# Patient Record
Sex: MALE | Race: WHITE | NOT HISPANIC OR LATINO | Employment: OTHER | URBAN - METROPOLITAN AREA
[De-identification: names, ages, dates, MRNs, and addresses within clinical notes are randomized per-mention and may not be internally consistent; named-entity substitution may affect disease eponyms.]

---

## 2018-01-22 ENCOUNTER — TELEPHONE (OUTPATIENT)
Dept: INTERNAL MEDICINE | Facility: CLINIC | Age: 73
End: 2018-01-22

## 2018-01-22 NOTE — TELEPHONE ENCOUNTER
PATIENTS WIFE CALLED AND ASKED IF PATIENT CAN ESTABLISH CARE WITH DR. WYATT. PATIENT IS CURRENTLY IN NEED OF A REFERRAL TO CARDOIOLOGIST. THANK YOU.

## 2018-01-23 NOTE — TELEPHONE ENCOUNTER
Confirmed he is the  of Lula Ellis .  She was notified that he can establish with Dr Knott.      Anna please schedule his new patient appt

## 2018-03-22 ENCOUNTER — OFFICE VISIT (OUTPATIENT)
Dept: INTERNAL MEDICINE | Facility: CLINIC | Age: 73
End: 2018-03-22

## 2018-03-22 VITALS
RESPIRATION RATE: 20 BRPM | TEMPERATURE: 97.5 F | WEIGHT: 182.5 LBS | DIASTOLIC BLOOD PRESSURE: 80 MMHG | SYSTOLIC BLOOD PRESSURE: 150 MMHG | HEART RATE: 68 BPM | HEIGHT: 66 IN | BODY MASS INDEX: 29.33 KG/M2

## 2018-03-22 DIAGNOSIS — I10 ESSENTIAL HYPERTENSION: Primary | ICD-10-CM

## 2018-03-22 DIAGNOSIS — G25.81 RESTLESS LEG SYNDROME: ICD-10-CM

## 2018-03-22 DIAGNOSIS — H93.8X3 PRESSURE SENSATION IN BOTH EARS: ICD-10-CM

## 2018-03-22 DIAGNOSIS — Z23 NEED FOR STREPTOCOCCUS PNEUMONIAE VACCINATION: ICD-10-CM

## 2018-03-22 PROCEDURE — G0009 ADMIN PNEUMOCOCCAL VACCINE: HCPCS | Performed by: INTERNAL MEDICINE

## 2018-03-22 PROCEDURE — 90670 PCV13 VACCINE IM: CPT | Performed by: INTERNAL MEDICINE

## 2018-03-22 PROCEDURE — 99203 OFFICE O/P NEW LOW 30 MIN: CPT | Performed by: INTERNAL MEDICINE

## 2018-03-22 RX ORDER — AMOXICILLIN AND CLAVULANATE POTASSIUM 875; 125 MG/1; MG/1
TABLET, FILM COATED ORAL
COMMUNITY
Start: 2018-01-15 | End: 2018-03-22

## 2018-03-22 RX ORDER — HYDROCHLOROTHIAZIDE 25 MG/1
1 TABLET ORAL DAILY
COMMUNITY
Start: 2018-02-20 | End: 2018-06-29

## 2018-03-22 RX ORDER — GABAPENTIN 300 MG/1
1 CAPSULE ORAL DAILY
COMMUNITY
Start: 2018-03-14

## 2018-03-22 RX ORDER — ATORVASTATIN CALCIUM 20 MG/1
1 TABLET, FILM COATED ORAL DAILY
COMMUNITY
Start: 2018-03-07 | End: 2018-06-29

## 2018-03-22 NOTE — PROGRESS NOTES
John Ellis is a 72 y.o. male.     Chief Complaint   Patient presents with   • Hypertension     Establish Care non fasting        History obtained from the patient.    The patient is establishing care.   records received and reviewed.      History of Present Illness       Cardiac Follow-up:    His Hypertension is stable.    Medications: Hydrochlorothiazide daily.    His Hyperlipidemia is stable.    LDL goal less than 130.  Last  on 9/26/17 ( records).    Medication: Atorvastatin.    The patient is adherent with his medications.  He denies medication side effects.      Interval events: The patient states his blood pressure at home used to run 130-140 / 75-80.  He was visiting his nephew, whose wife is a cardiologist, in January 2018.  The patient felt funny, so he had his blood pressure taken.  He states the top number was 180-190.  His nephew's wife called in a prescription for Hydrochlorothiazide for him.  Since then his blood pressure at home has been running 120-135 / 70-73  The patient saw Dr. Dunbar in  February 2018.  The patient states labs were done and his cholesterol was normal.  However, Dr. Dunbar started him on Atorvastatin.  He has not had a problem with the medication.  He has a follow up appointment with Dr. Dunbar on 4/20/18.  Per records, the patient had been on Zocor  20 years ago and it caused dizziness and abdominal pain, so he stopped it.      Symptoms: Denies chest pain, shortness of breath, orthopnea, PND, AQUINO, palpitations, syncope, lightheadedness, dizziness, and headache.   Associated symptoms: No significant weight change.  Denies fatigue, blurred or double vision, myalgias, arthralgias, polyuria, polydipsia, memory loss, and concentration problems.      Lifestyle: The patient follows a diverse and healthy diet.  Exercise: None, but the patient states he is very active.    Colon Polyps Follow-up: The patient is here for follow-up of Colon Polyps which have been  stable per his report.   Interval events: Last colonoscopy was done 9/14/04 (one rectal polyp) per  records.  The patient states he had another Colonoscopy in 2016, which showed a polyp.  Symptoms: Denies abdominal pain, nausea, vomiting, diarrhea, blood in stool, black stool, or changes in the stool.    Medication: None.    Ear Pressure Acute Visit:  The patient's major complaint today is bilateral ear pressure, intermittently for the past 3 years.    Interval events: The patient has been seeing Dr. Blank for several years.  He states he has had negative allergy testing and a normal CT of his sinuses.  He states he has always had a normal exam, including a scope of his throat.  However, he states at his last follow-up it was discovered that he had a right thyroid nodule.  He states ultrasound showed it to just be a calcium deposit.  The patient would like to see another ENT doctor for a second opinion.    Symptoms: He has some congestion and postnasal drainage, as well as a cough productive of clear sputum, intermittently.  Complains of intermittent sore throat, but no rhinorrhea, earache, headache, chest pain, shortness of breath, or wheezing.    Medication: None, but he has tried a little, Flonase, and possibly Astepro without relief.          No current outpatient prescriptions on file prior to visit.     No current facility-administered medications on file prior to visit.        Current outpatient and discharge medications have been reconciled for the patient.  Salome Knott MD        The following portions of the patient's history were reviewed and updated as appropriate: allergies, current medications, past family history, past medical history, past social history, past surgical history and problem list.    Review of Systems   Constitutional: Negative for chills, fatigue, fever and unexpected weight change.   HENT: Positive for congestion, ear pain (pressure), sore throat and tinnitus. Negative for ear  "discharge, postnasal drip, rhinorrhea, sinus pain, sinus pressure and voice change.    Eyes: Negative for pain, discharge, redness, itching and visual disturbance.   Respiratory: Positive for cough. Negative for shortness of breath and wheezing.         Denies hemoptysis.   Cardiovascular: Negative for chest pain, palpitations and leg swelling.        No AQUINO, orthopnea, or claudication.   Gastrointestinal: Negative for abdominal pain, blood in stool, constipation, diarrhea, nausea and vomiting.        Denies melena.   Endocrine: Negative for polydipsia and polyuria.   Musculoskeletal: Negative for arthralgias and myalgias.   Neurological: Negative for dizziness, syncope, light-headedness and headaches.        No memory issues.   Hematological: Negative for adenopathy.   Psychiatric/Behavioral: Negative for decreased concentration.         Objective       Blood pressure 150/80, pulse 68, temperature 97.5 °F (36.4 °C), temperature source Temporal Artery , resp. rate 20, height 167 cm (65.75\"), weight 82.8 kg (182 lb 8 oz).      Physical Exam   Constitutional:   Overweight.   HENT:   Head: Normocephalic and atraumatic.   Right Ear: Tympanic membrane, external ear and ear canal normal.   Left Ear: Tympanic membrane, external ear and ear canal normal.   Mouth/Throat: Oropharynx is clear and moist and mucous membranes are normal. No oral lesions.   Tonsils normal.  No sinus tenderness to palpation.   Eyes: Conjunctivae are normal.   Neck: Normal range of motion. Neck supple. Carotid bruit is not present. Thyroid mass (right nodule) present. No thyromegaly present.   Cardiovascular: Normal rate, regular rhythm, normal heart sounds and intact distal pulses.  Exam reveals no gallop and no friction rub.    No murmur heard.  No peripheral edema.   Pulmonary/Chest: Effort normal and breath sounds normal.   Abdominal: Soft. Bowel sounds are normal. He exhibits no distension, no abdominal bruit and no mass. There is no " hepatosplenomegaly. There is no tenderness.   Lymphadenopathy:     He has no cervical adenopathy.   Skin: No rash noted.   Psychiatric: He has a normal mood and affect.   Nursing note and vitals reviewed.      Assessment / Plan:    Jasiel was seen today for hypertension.    Diagnoses and all orders for this visit:    Essential hypertension    Pressure sensation in both ears    Restless leg syndrome    Need for Streptococcus pneumoniae vaccination  -     Pneumococcal Conjugate Vaccine 13-Valent All      The patient declined scheduling a CT lung cancer screening today, despite my strong recommendation to have the test.    The patient declined scheduling a Medicare Wellness Exam today.    Recommended the patient have a Zostavax done at the pharmacy.  He declined.  He declined an Influenza vaccine today.    Return in about 3 months (around 6/22/2018) for Recheck-HTN fadting.

## 2018-03-25 PROBLEM — E04.1 SOLITARY NODULE OF RIGHT LOBE OF THYROID: Status: ACTIVE | Noted: 2018-03-25

## 2018-03-25 PROBLEM — G25.81 RESTLESS LEGS SYNDROME (RLS): Status: ACTIVE | Noted: 2018-03-25

## 2018-03-25 PROBLEM — M25.562 CHRONIC PAIN OF BOTH KNEES: Status: ACTIVE | Noted: 2018-03-25

## 2018-03-25 PROBLEM — G89.29 CHRONIC PAIN OF BOTH KNEES: Status: ACTIVE | Noted: 2018-03-25

## 2018-03-25 PROBLEM — M25.561 CHRONIC PAIN OF BOTH KNEES: Status: ACTIVE | Noted: 2018-03-25

## 2018-03-25 PROBLEM — E78.5 HYPERLIPIDEMIA: Status: ACTIVE | Noted: 2018-03-25

## 2018-03-25 PROBLEM — H91.93 BILATERAL HEARING LOSS: Status: ACTIVE | Noted: 2018-03-25

## 2018-03-25 PROBLEM — K63.5 BENIGN COLON POLYP: Status: ACTIVE | Noted: 2018-03-25

## 2018-04-13 ENCOUNTER — LAB (OUTPATIENT)
Dept: LAB | Facility: HOSPITAL | Age: 73
End: 2018-04-13

## 2018-04-13 DIAGNOSIS — I10 HYPERTENSION, UNSPECIFIED TYPE: Primary | ICD-10-CM

## 2018-04-13 LAB
ALBUMIN SERPL-MCNC: 4.4 G/DL (ref 3.2–4.8)
ALP SERPL-CCNC: 62 U/L (ref 25–100)
ALT SERPL W P-5'-P-CCNC: 33 U/L (ref 7–40)
ANION GAP SERPL CALCULATED.3IONS-SCNC: 11 MMOL/L (ref 3–11)
ARTICHOKE IGE QN: 152 MG/DL (ref 0–130)
AST SERPL-CCNC: 34 U/L (ref 0–33)
BILIRUB CONJ SERPL-MCNC: 0.1 MG/DL (ref 0–0.2)
BILIRUB INDIRECT SERPL-MCNC: 0.4 MG/DL (ref 0.1–1.1)
BILIRUB SERPL-MCNC: 0.5 MG/DL (ref 0.3–1.2)
BUN BLD-MCNC: 18 MG/DL (ref 9–23)
BUN/CREAT SERPL: 20 (ref 7–25)
CALCIUM SPEC-SCNC: 9.3 MG/DL (ref 8.7–10.4)
CHLORIDE SERPL-SCNC: 100 MMOL/L (ref 99–109)
CHOLEST SERPL-MCNC: 212 MG/DL (ref 0–200)
CO2 SERPL-SCNC: 26 MMOL/L (ref 20–31)
CREAT BLD-MCNC: 0.9 MG/DL (ref 0.6–1.3)
GFR SERPL CREATININE-BSD FRML MDRD: 83 ML/MIN/1.73
GLUCOSE BLD-MCNC: 98 MG/DL (ref 70–100)
HDLC SERPL-MCNC: 52 MG/DL (ref 40–60)
POTASSIUM BLD-SCNC: 4.2 MMOL/L (ref 3.5–5.5)
PROT SERPL-MCNC: 7.3 G/DL (ref 5.7–8.2)
SODIUM BLD-SCNC: 137 MMOL/L (ref 132–146)
TRIGL SERPL-MCNC: 175 MG/DL (ref 0–150)

## 2018-04-13 PROCEDURE — 80076 HEPATIC FUNCTION PANEL: CPT

## 2018-04-13 PROCEDURE — 80061 LIPID PANEL: CPT

## 2018-04-13 PROCEDURE — 36415 COLL VENOUS BLD VENIPUNCTURE: CPT

## 2018-04-13 PROCEDURE — 80048 BASIC METABOLIC PNL TOTAL CA: CPT

## 2018-06-29 ENCOUNTER — OFFICE VISIT (OUTPATIENT)
Dept: INTERNAL MEDICINE | Facility: CLINIC | Age: 73
End: 2018-06-29

## 2018-06-29 ENCOUNTER — TELEPHONE (OUTPATIENT)
Dept: INTERNAL MEDICINE | Facility: CLINIC | Age: 73
End: 2018-06-29

## 2018-06-29 VITALS
WEIGHT: 184.13 LBS | DIASTOLIC BLOOD PRESSURE: 80 MMHG | RESPIRATION RATE: 20 BRPM | BODY MASS INDEX: 29.94 KG/M2 | HEART RATE: 72 BPM | TEMPERATURE: 97 F | SYSTOLIC BLOOD PRESSURE: 140 MMHG

## 2018-06-29 DIAGNOSIS — J31.0 NON-ALLERGIC RHINITIS: ICD-10-CM

## 2018-06-29 DIAGNOSIS — K63.5 BENIGN COLON POLYP: ICD-10-CM

## 2018-06-29 DIAGNOSIS — Z11.59 NEED FOR HEPATITIS C SCREENING TEST: ICD-10-CM

## 2018-06-29 DIAGNOSIS — I10 ESSENTIAL HYPERTENSION: Primary | ICD-10-CM

## 2018-06-29 DIAGNOSIS — E78.49 OTHER HYPERLIPIDEMIA: ICD-10-CM

## 2018-06-29 LAB
ALBUMIN SERPL-MCNC: 4.42 G/DL (ref 3.2–4.8)
ALBUMIN/GLOB SERPL: 1.5 G/DL (ref 1.5–2.5)
ALP SERPL-CCNC: 61 U/L (ref 25–100)
ALT SERPL W P-5'-P-CCNC: 25 U/L (ref 7–40)
ANION GAP SERPL CALCULATED.3IONS-SCNC: 6 MMOL/L (ref 3–11)
ARTICHOKE IGE QN: 79 MG/DL (ref 0–130)
AST SERPL-CCNC: 26 U/L (ref 0–33)
BASOPHILS # BLD AUTO: 0.04 10*3/MM3 (ref 0–0.2)
BASOPHILS NFR BLD AUTO: 0.6 % (ref 0–1)
BILIRUB SERPL-MCNC: 0.7 MG/DL (ref 0.3–1.2)
BUN BLD-MCNC: 22 MG/DL (ref 9–23)
BUN/CREAT SERPL: 24.7 (ref 7–25)
CALCIUM SPEC-SCNC: 9.2 MG/DL (ref 8.7–10.4)
CHLORIDE SERPL-SCNC: 105 MMOL/L (ref 99–109)
CHOLEST SERPL-MCNC: 134 MG/DL (ref 0–200)
CLARITY, POC: CLEAR
CO2 SERPL-SCNC: 28 MMOL/L (ref 20–31)
COLOR UR: NORMAL
CREAT BLD-MCNC: 0.89 MG/DL (ref 0.6–1.3)
DEPRECATED RDW RBC AUTO: 40.9 FL (ref 37–54)
EOSINOPHIL # BLD AUTO: 0.2 10*3/MM3 (ref 0–0.3)
EOSINOPHIL NFR BLD AUTO: 2.8 % (ref 0–3)
ERYTHROCYTE [DISTWIDTH] IN BLOOD BY AUTOMATED COUNT: 13 % (ref 11.3–14.5)
EXPIRATION DATE: NORMAL
GFR SERPL CREATININE-BSD FRML MDRD: 85 ML/MIN/1.73
GLOBULIN UR ELPH-MCNC: 3 GM/DL
GLUCOSE BLD-MCNC: 108 MG/DL (ref 70–100)
GLUCOSE UR STRIP-MCNC: NEGATIVE MG/DL
HCT VFR BLD AUTO: 43.9 % (ref 38.9–50.9)
HCV AB SER DONR QL: NORMAL
HDLC SERPL-MCNC: 49 MG/DL (ref 40–60)
HGB BLD-MCNC: 14.4 G/DL (ref 13.1–17.5)
IMM GRANULOCYTES # BLD: 0.01 10*3/MM3 (ref 0–0.03)
IMM GRANULOCYTES NFR BLD: 0.1 % (ref 0–0.6)
KETONES UR QL: NEGATIVE
LEUKOCYTE EST, POC: NEGATIVE
LYMPHOCYTES # BLD AUTO: 2.33 10*3/MM3 (ref 0.6–4.8)
LYMPHOCYTES NFR BLD AUTO: 32.5 % (ref 24–44)
Lab: NORMAL
MCH RBC QN AUTO: 28.2 PG (ref 27–31)
MCHC RBC AUTO-ENTMCNC: 32.8 G/DL (ref 32–36)
MCV RBC AUTO: 85.9 FL (ref 80–99)
MONOCYTES # BLD AUTO: 0.56 10*3/MM3 (ref 0–1)
MONOCYTES NFR BLD AUTO: 7.8 % (ref 0–12)
NEUTROPHILS # BLD AUTO: 4.03 10*3/MM3 (ref 1.5–8.3)
NEUTROPHILS NFR BLD AUTO: 56.2 % (ref 41–71)
NITRITE UR-MCNC: NEGATIVE MG/ML
PH UR: 6 [PH] (ref 5–8)
PLATELET # BLD AUTO: 200 10*3/MM3 (ref 150–450)
PMV BLD AUTO: 10.7 FL (ref 6–12)
POTASSIUM BLD-SCNC: 4.4 MMOL/L (ref 3.5–5.5)
PROT SERPL-MCNC: 7.4 G/DL (ref 5.7–8.2)
PROT UR STRIP-MCNC: NEGATIVE MG/DL
PROT/CREAT UR: 300 MG/G CREA
RBC # BLD AUTO: 5.11 10*6/MM3 (ref 4.2–5.76)
RBC # UR STRIP: NEGATIVE /UL
SODIUM BLD-SCNC: 139 MMOL/L (ref 132–146)
SP GR UR: 1.02 (ref 1–1.03)
TRIGL SERPL-MCNC: 100 MG/DL (ref 0–150)
WBC NRBC COR # BLD: 7.17 10*3/MM3 (ref 3.5–10.8)

## 2018-06-29 PROCEDURE — 86803 HEPATITIS C AB TEST: CPT | Performed by: INTERNAL MEDICINE

## 2018-06-29 PROCEDURE — 80061 LIPID PANEL: CPT | Performed by: INTERNAL MEDICINE

## 2018-06-29 PROCEDURE — 99214 OFFICE O/P EST MOD 30 MIN: CPT | Performed by: INTERNAL MEDICINE

## 2018-06-29 PROCEDURE — 36415 COLL VENOUS BLD VENIPUNCTURE: CPT | Performed by: INTERNAL MEDICINE

## 2018-06-29 PROCEDURE — 80053 COMPREHEN METABOLIC PANEL: CPT | Performed by: INTERNAL MEDICINE

## 2018-06-29 PROCEDURE — 81002 URINALYSIS NONAUTO W/O SCOPE: CPT | Performed by: INTERNAL MEDICINE

## 2018-06-29 PROCEDURE — 85025 COMPLETE CBC W/AUTO DIFF WBC: CPT | Performed by: INTERNAL MEDICINE

## 2018-06-29 RX ORDER — AZELASTINE 1 MG/ML
2 SPRAY, METERED NASAL DAILY
Refills: 12
Start: 2018-06-29 | End: 2019-01-07

## 2018-06-29 RX ORDER — AZELASTINE HCL 205.5 UG/1
2 SPRAY NASAL DAILY
Qty: 1 EACH | Refills: 5 | Status: SHIPPED | OUTPATIENT
Start: 2018-06-29 | End: 2018-06-29 | Stop reason: CLARIF

## 2018-06-29 RX ORDER — LOSARTAN POTASSIUM AND HYDROCHLOROTHIAZIDE 12.5; 1 MG/1; MG/1
1 TABLET ORAL DAILY
COMMUNITY
Start: 2018-06-14 | End: 2019-07-08 | Stop reason: DRUGHIGH

## 2018-06-29 RX ORDER — ROSUVASTATIN CALCIUM 40 MG/1
1 TABLET, COATED ORAL DAILY
COMMUNITY
Start: 2018-06-18 | End: 2019-07-08 | Stop reason: SDUPTHER

## 2018-06-29 NOTE — TELEPHONE ENCOUNTER
Spoke with Nicole [facility] and notified to change Rx .  Med list updated.

## 2018-06-29 NOTE — TELEPHONE ENCOUNTER
----- Message from Louisa Handley sent at 6/29/2018 10:06 AM EDT -----  PHARMACY IS WANTING TO CHANGE azelastine (ASTEPRO) 0.15 % solution nasal spray TO .1%. INSURANCE WILL NOT COVER THE 0.15%.     [facility]     PLEASE ADVISE

## 2018-06-29 NOTE — PROGRESS NOTES
John Ellis is a 72 y.o. male.     Chief Complaint   Patient presents with   • Hypertension     3 month follow up  fasting        History obtained from the patient.      History of Present Illness     The patient has seen Dr. Blank in the past for a right-sided Thyroid Nodule.  Ultrasound showed the nodule to be calcified, so biopsy was not recommended.  On 5/7/18, the patient had a normal TSH, T4, T3, and CMP.    The patient sees Dr. Bernstein for Restless Leg Syndrome, stable on Gabapentin and Sinemet.    The patient states he sees Dr. Francisco, Urology, once per year.  He states his last PSA test was normal in September or October 2017.    Cardiac Follow-up:     His Hypertension is stable.    Medications: Hydrochlorothiazide and Losartan / HCTZ.  His Hyperlipidemia is unstable.    LDL goal less than 130.  Last  on 4/13/18.  Triglycerides were 175.     Medication: Crestor.    The patient is adherent with his medications.  He denies medication side effects.       Interval events: The patient states his blood pressure at home has been 105-115/ 70-80.  His blood pressure when at his Cardiologist's office in April was 138 / 81.  The patient saw Dr. Dunbar's PA, Rico Wick, on 4/13/18.  Fasting labs were done.  He was started on Losartan/HCTZ and Crestor.    His Aorvastatin had caused myalgias.     Symptoms: Denies chest pain, shortness of breath, orthopnea, PND, AQUINO, palpitations, syncope, lightheadedness, dizziness, and headache.   Associated symptoms:  Reports fatigue. Reports mild memory loss and concentration problems.  No significant weight change.  Denies  blurred or double vision, myalgias, arthralgias, polyuria, andpolydipsia.     Lifestyle: The patient follows a diverse and healthy diet.  Exercise: None, but the patient states he is very active.  Tobacco Use:  Former Smoker.     Colon Polyps Follow-up: The patient is here for follow-up of Colon Polyps which have been stable per  his report.   Interval events: Last colonoscopy was done 9/14/04 (one rectal polyp) per  records.  The patient states he had another Colonoscopy in 2016, which showed a polyp.  Symptoms: Denies abdominal pain, nausea, vomiting, diarrhea, blood in stool, black stool, or changes in the stool.    Medication: None.    Current Outpatient Prescriptions on File Prior to Visit   Medication Sig Dispense Refill   • carbidopa-levodopa (SINEMET)  MG per tablet 1 tablet Daily.     • gabapentin (NEURONTIN) 300 MG capsule 1 capsule 3 (Three) Times a Day.     • [DISCONTINUED] atorvastatin (LIPITOR) 20 MG tablet 1 tablet Daily.     • [DISCONTINUED] Glucosamine-Chondroitin (GLUCOSAMINE CHONDR COMPLEX PO) Take  by mouth.     • [DISCONTINUED] hydrochlorothiazide (HYDRODIURIL) 25 MG tablet 1 tablet Daily.       No current facility-administered medications on file prior to visit.        Current outpatient and discharge medications have been reconciled for the patient.  Reviewed by: Salome Knott MD        The following portions of the patient's history were reviewed and updated as appropriate: allergies, current medications, past family history, past medical history, past social history, past surgical history and problem list.    Review of Systems   Constitutional: Positive for fatigue. Negative for unexpected weight change.   HENT: Positive for congestion, hearing loss, postnasal drip, rhinorrhea, sore throat and tinnitus. Negative for ear discharge and ear pain (has ear pressure bilaterally).         He states he saw Dr. Blank for the allergy symptoms and tinnitus.  Allergy testing was negative per his report.  Hearing aids were recommended.  He was not started on any medication.   Eyes: Negative for visual disturbance.   Respiratory: Negative for cough, shortness of breath and wheezing.    Cardiovascular: Negative for chest pain, palpitations and leg swelling.        No AQUINO, orthopnea, or claudication.   Gastrointestinal:  Negative for abdominal pain, blood in stool, constipation, diarrhea, nausea and vomiting.        Denies melena.   Endocrine: Negative for polydipsia and polyuria.   Musculoskeletal: Positive for myalgias. Negative for arthralgias.   Neurological: Negative for dizziness, syncope, light-headedness and headaches.        Mild memory issues.   Psychiatric/Behavioral: Positive for decreased concentration (mild).         Objective       Blood pressure 140/80, pulse 72, temperature 97 °F (36.1 °C), temperature source Temporal Artery , resp. rate 20, weight 83.5 kg (184 lb 2 oz).      Physical Exam   Constitutional:   Overweight.   Neck: Normal range of motion. Neck supple. Carotid bruit is not present. No thyromegaly present.   Cardiovascular: Normal rate, regular rhythm, normal heart sounds and intact distal pulses.  Exam reveals no gallop and no friction rub.    No murmur heard.  No peripheral edema.   Pulmonary/Chest: Effort normal and breath sounds normal.   Abdominal: Soft. Bowel sounds are normal. He exhibits no distension, no abdominal bruit and no mass. There is no hepatosplenomegaly. There is no tenderness.   Psychiatric: He has a normal mood and affect.   Nursing note and vitals reviewed.      Assessment / Plan:  Jasiel was seen today for hypertension.    Diagnoses and all orders for this visit:    Essential hypertension  -     CBC & Differential  -     POC Urinalysis Dipstick, Multipro  -     CBC Auto Differential    Other hyperlipidemia  -     Lipid Panel  -     Comprehensive Metabolic Panel    Benign colon polyp    Non-allergic rhinitis  -     Azelastine (ASTEPRO) 0.15 % solution nasal spray; 2 sprays into each nostril Daily.    Need for hepatitis C screening test  -     Hepatitis C Antibody           The patient declined scheduling a CT lung cancer screening today, despite my strong recommendation to have the test.    The patient declined scheduling a abdominal aortic ultrasound today,despite my strong  recommendation to have the test.     The patient declined scheduling a Medicare Wellness Exam today.     Recommended the patient have a Shingrix done at the pharmacy.  He declined.    KEVIN signed to obtain last Colonoscopy report.      Return in about 6 months (around 12/29/2018) for Recheck, HTN,  fasting.

## 2019-01-07 ENCOUNTER — OFFICE VISIT (OUTPATIENT)
Dept: INTERNAL MEDICINE | Facility: CLINIC | Age: 74
End: 2019-01-07

## 2019-01-07 VITALS
DIASTOLIC BLOOD PRESSURE: 80 MMHG | RESPIRATION RATE: 20 BRPM | TEMPERATURE: 97.1 F | WEIGHT: 182.25 LBS | BODY MASS INDEX: 29.64 KG/M2 | SYSTOLIC BLOOD PRESSURE: 120 MMHG | HEART RATE: 72 BPM

## 2019-01-07 DIAGNOSIS — M25.562 CHRONIC PAIN OF BOTH KNEES: ICD-10-CM

## 2019-01-07 DIAGNOSIS — M25.561 CHRONIC PAIN OF BOTH KNEES: ICD-10-CM

## 2019-01-07 DIAGNOSIS — G89.29 CHRONIC PAIN OF BOTH KNEES: ICD-10-CM

## 2019-01-07 DIAGNOSIS — I10 ESSENTIAL HYPERTENSION: Primary | ICD-10-CM

## 2019-01-07 DIAGNOSIS — K63.5 BENIGN COLON POLYP: ICD-10-CM

## 2019-01-07 DIAGNOSIS — E78.49 OTHER HYPERLIPIDEMIA: ICD-10-CM

## 2019-01-07 LAB
ALBUMIN SERPL-MCNC: 4.58 G/DL (ref 3.2–4.8)
ALBUMIN/GLOB SERPL: 1.9 G/DL (ref 1.5–2.5)
ALP SERPL-CCNC: 59 U/L (ref 25–100)
ALT SERPL W P-5'-P-CCNC: 26 U/L (ref 7–40)
ANION GAP SERPL CALCULATED.3IONS-SCNC: 6 MMOL/L (ref 3–11)
ARTICHOKE IGE QN: 70 MG/DL (ref 0–130)
AST SERPL-CCNC: 31 U/L (ref 0–33)
BASOPHILS # BLD AUTO: 0.03 10*3/MM3 (ref 0–0.2)
BASOPHILS NFR BLD AUTO: 0.3 % (ref 0–1)
BILIRUB SERPL-MCNC: 0.6 MG/DL (ref 0.3–1.2)
BUN BLD-MCNC: 21 MG/DL (ref 9–23)
BUN/CREAT SERPL: 21.4 (ref 7–25)
CALCIUM SPEC-SCNC: 9.6 MG/DL (ref 8.7–10.4)
CHLORIDE SERPL-SCNC: 100 MMOL/L (ref 99–109)
CHOLEST SERPL-MCNC: 135 MG/DL (ref 0–200)
CO2 SERPL-SCNC: 30 MMOL/L (ref 20–31)
CREAT BLD-MCNC: 0.98 MG/DL (ref 0.6–1.3)
DEPRECATED RDW RBC AUTO: 39.2 FL (ref 37–54)
EOSINOPHIL # BLD AUTO: 0.29 10*3/MM3 (ref 0–0.3)
EOSINOPHIL NFR BLD AUTO: 3.1 % (ref 0–3)
ERYTHROCYTE [DISTWIDTH] IN BLOOD BY AUTOMATED COUNT: 12.7 % (ref 11.3–14.5)
GFR SERPL CREATININE-BSD FRML MDRD: 75 ML/MIN/1.73
GLOBULIN UR ELPH-MCNC: 2.4 GM/DL
GLUCOSE BLD-MCNC: 111 MG/DL (ref 70–100)
HCT VFR BLD AUTO: 45 % (ref 38.9–50.9)
HDLC SERPL-MCNC: 50 MG/DL (ref 40–60)
HGB BLD-MCNC: 14.9 G/DL (ref 13.1–17.5)
IMM GRANULOCYTES # BLD AUTO: 0.03 10*3/MM3 (ref 0–0.03)
IMM GRANULOCYTES NFR BLD AUTO: 0.3 % (ref 0–0.6)
LYMPHOCYTES # BLD AUTO: 2.91 10*3/MM3 (ref 0.6–4.8)
LYMPHOCYTES NFR BLD AUTO: 31.1 % (ref 24–44)
MCH RBC QN AUTO: 28.1 PG (ref 27–31)
MCHC RBC AUTO-ENTMCNC: 33.1 G/DL (ref 32–36)
MCV RBC AUTO: 84.9 FL (ref 80–99)
MONOCYTES # BLD AUTO: 0.78 10*3/MM3 (ref 0–1)
MONOCYTES NFR BLD AUTO: 8.3 % (ref 0–12)
NEUTROPHILS # BLD AUTO: 5.32 10*3/MM3 (ref 1.5–8.3)
NEUTROPHILS NFR BLD AUTO: 56.9 % (ref 41–71)
PLATELET # BLD AUTO: 212 10*3/MM3 (ref 150–450)
PMV BLD AUTO: 10.6 FL (ref 6–12)
POTASSIUM BLD-SCNC: 4.1 MMOL/L (ref 3.5–5.5)
PROT SERPL-MCNC: 7 G/DL (ref 5.7–8.2)
RBC # BLD AUTO: 5.3 10*6/MM3 (ref 4.2–5.76)
SODIUM BLD-SCNC: 136 MMOL/L (ref 132–146)
TRIGL SERPL-MCNC: 134 MG/DL (ref 0–150)
TSH SERPL DL<=0.05 MIU/L-ACNC: 4.33 MIU/ML (ref 0.35–5.35)
WBC NRBC COR # BLD: 9.36 10*3/MM3 (ref 3.5–10.8)

## 2019-01-07 PROCEDURE — 80061 LIPID PANEL: CPT | Performed by: INTERNAL MEDICINE

## 2019-01-07 PROCEDURE — 84443 ASSAY THYROID STIM HORMONE: CPT | Performed by: INTERNAL MEDICINE

## 2019-01-07 PROCEDURE — 36415 COLL VENOUS BLD VENIPUNCTURE: CPT | Performed by: INTERNAL MEDICINE

## 2019-01-07 PROCEDURE — 85025 COMPLETE CBC W/AUTO DIFF WBC: CPT | Performed by: INTERNAL MEDICINE

## 2019-01-07 PROCEDURE — 80053 COMPREHEN METABOLIC PANEL: CPT | Performed by: INTERNAL MEDICINE

## 2019-01-07 PROCEDURE — 99214 OFFICE O/P EST MOD 30 MIN: CPT | Performed by: INTERNAL MEDICINE

## 2019-01-07 NOTE — PROGRESS NOTES
John Ellis is a 73 y.o. male.     Chief Complaint   Patient presents with   • Hypertension     6 month follow up fasting        History obtained from the patient.      History of Present Illness     The patient has seen Dr. Blank in the past for a right-sided Thyroid Nodule.  Ultrasound showed the nodule to be calcified, so biopsy was not recommended. He is due for follow-up, but is not sure he wants to continue to see Dr. Blank.     The patient sees Dr. Bernstein for Restless Leg Syndrome, stable on Gabapentin and Sinemet.     The patient states he sees Dr. Francisco, Urology, once per year.  He states his last PSA test was 1/4/19, results pending.     Cardiac Follow-up:     His Hypertension is stable.    Medications:  Losartan / HCTZ.  His Hyperlipidemia is stable.    LDL goal less than 130.  Last LDL 79 on 6/29/18.     Medication: Crestor.    The patient is adherent with his medications.  He denies medication side effects.       Interval events: The patient sees Dr. Dunbar.     Symptoms: Denies chest pain, shortness of breath, AQUINO, orthopnea, PND,  palpitations, syncope, lightheadedness, and dizziness.    Associated symptoms:  No significant weight change.  Denies fatigue, headache,  blurred or double vision, myalgias, arthralgias, polyuria, polydipsia, memory loss, concentration problems..     Lifestyle: The patient follows a diverse and healthy diet.  Exercise: None, but the patient states he is very active.  Tobacco Use:  Former Smoker.     Colon Polyps Follow-up: The patient is here for follow-up of Colon Polyps which have been stable/  Interval events: Last Colonoscopy was done 3/28/17, sigmoid polyp.  Symptoms: Denies abdominal pain, nausea, vomiting, diarrhea, blood in stool, black stool, or changes in the stool.    Medication: None.    Current Outpatient Medications on File Prior to Visit   Medication Sig Dispense Refill   • carbidopa-levodopa (SINEMET)  MG per tablet 1 tablet  Daily.     • gabapentin (NEURONTIN) 300 MG capsule 1 capsule 3 (Three) Times a Day.     • losartan-hydrochlorothiazide (HYZAAR) 100-12.5 MG per tablet 1 tablet Daily.     • rosuvastatin (CRESTOR) 40 MG tablet 1 tablet Daily.     • [DISCONTINUED] azelastine (ASTELIN) 0.1 % nasal spray 2 sprays into each nostril Daily. Use in each nostril as directed  12     No current facility-administered medications on file prior to visit.        Current outpatient and discharge medications have been reconciled for the patient.  Reviewed by: Salome Knott MD        The following portions of the patient's history were reviewed and updated as appropriate: allergies, current medications, past family history, past medical history, past social history, past surgical history and problem list.    Review of Systems   Constitutional: Negative for fatigue and unexpected weight change.   Eyes: Negative for visual disturbance.   Respiratory: Negative for cough, shortness of breath and wheezing.    Cardiovascular: Negative for chest pain, palpitations and leg swelling.        No AQUINO, orthopnea, or claudication.   Gastrointestinal: Negative for abdominal pain, blood in stool, constipation, diarrhea, nausea and vomiting.        Denies melena.   Endocrine: Negative for polydipsia and polyuria.   Musculoskeletal: Negative for arthralgias and myalgias.   Neurological: Negative for dizziness, syncope, light-headedness and headaches.        No memory issues.   Psychiatric/Behavioral: Negative for decreased concentration.         Objective       Blood pressure 120/80, pulse 72, temperature 97.1 °F (36.2 °C), temperature source Temporal, resp. rate 20, weight 82.7 kg (182 lb 4 oz).      Physical Exam   Constitutional:   Overweight.   Neck: Normal range of motion. Neck supple. Carotid bruit is not present. No thyromegaly present.   Cardiovascular: Normal rate, regular rhythm, normal heart sounds and intact distal pulses. Exam reveals no gallop and no  friction rub.   No murmur heard.  No peripheral edema.   Pulmonary/Chest: Effort normal and breath sounds normal.   Abdominal: Soft. Bowel sounds are normal. He exhibits no distension, no abdominal bruit and no mass. There is no hepatosplenomegaly. There is no tenderness.   Psychiatric: He has a normal mood and affect.   Nursing note and vitals reviewed.      Assessment / Plan:  Jasiel was seen today for hypertension.    Diagnoses and all orders for this visit:    Essential hypertension  -     CBC & Differential  -     CBC Auto Differential    Other hyperlipidemia  -     Lipid Panel  -     Comprehensive Metabolic Panel  -     TSH    Chronic pain of both knees    Benign colon polyp        Recommend Hepatitis A vaccine at the pharmacy.  The patient states he will think about getting this.  He declines getting Shingrix at the pharmacy.  He declined an Influenza vaccine today.  I strongly recommended these immunizations, and inform the patient he can die from influenza  infection.    The patient declined scheduling an abdominal aortic aneurysm screening ultrasound and a CT lung cancer screening.    The patient refused to have an abdominal aortic aneurysm screening ultrasound and a CT lung cancer screening..  The patient was informed that in my medical opinion, the tests are medically necessary, and failure to obtain the test may result in conditions that lead to death, inability to diagnose the condition, and progression of the condition.    The patient declined scheduling a Medicare Wellness Exam today.        Return in about 6 months (around 7/7/2019) for Recheck HTN, fasting.

## 2019-01-07 NOTE — PATIENT INSTRUCTIONS
Recommend Hepatitis A vaccine at the pharmacy.    Heart-Healthy Eating Plan  Many factors influence your heart health, including eating and exercise habits. Heart (coronary) risk increases with abnormal blood fat (lipid) levels. Heart-healthy meal planning includes limiting unhealthy fats, increasing healthy fats, and making other small dietary changes. This includes maintaining a healthy body weight to help keep lipid levels within a normal range.  What is my plan?  Your health care provider recommends that you:  · Get no more than _________% of the total calories in your daily diet from fat.  · Limit your intake of saturated fat to less than _________% of your total calories each day.  · Limit the amount of cholesterol in your diet to less than _________ mg per day.    What types of fat should I choose?  · Choose healthy fats more often. Choose monounsaturated and polyunsaturated fats, such as olive oil and canola oil, flaxseeds, walnuts, almonds, and seeds.  · Eat more omega-3 fats. Good choices include salmon, mackerel, sardines, tuna, flaxseed oil, and ground flaxseeds. Aim to eat fish at least two times each week.  · Limit saturated fats. Saturated fats are primarily found in animal products, such as meats, butter, and cream. Plant sources of saturated fats include palm oil, palm kernel oil, and coconut oil.  · Avoid foods with partially hydrogenated oils in them. These contain trans fats. Examples of foods that contain trans fats are stick margarine, some tub margarines, cookies, crackers, and other baked goods.  What general guidelines do I need to follow?  · Check food labels carefully to identify foods with trans fats or high amounts of saturated fat.  · Fill one half of your plate with vegetables and green salads. Eat 4-5 servings of vegetables per day. A serving of vegetables equals 1 cup of raw leafy vegetables, ½ cup of raw or cooked cut-up vegetables, or ½ cup of vegetable juice.  · Fill one fourth  "of your plate with whole grains. Look for the word \"whole\" as the first word in the ingredient list.  · Fill one fourth of your plate with lean protein foods.  · Eat 4-5 servings of fruit per day. A serving of fruit equals one medium whole fruit, ¼ cup of dried fruit, ½ cup of fresh, frozen, or canned fruit, or ½ cup of 100% fruit juice.  · Eat more foods that contain soluble fiber. Examples of foods that contain this type of fiber are apples, broccoli, carrots, beans, peas, and barley. Aim to get 20-30 g of fiber per day.  · Eat more home-cooked food and less restaurant, buffet, and fast food.  · Limit or avoid alcohol.  · Limit foods that are high in starch and sugar.  · Avoid fried foods.  · Cook foods by using methods other than frying. Baking, boiling, grilling, and broiling are all great options. Other fat-reducing suggestions include:  ? Removing the skin from poultry.  ? Removing all visible fats from meats.  ? Skimming the fat off of stews, soups, and gravies before serving them.  ? Steaming vegetables in water or broth.  · Lose weight if you are overweight. Losing just 5-10% of your initial body weight can help your overall health and prevent diseases such as diabetes and heart disease.  · Increase your consumption of nuts, legumes, and seeds to 4-5 servings per week. One serving of dried beans or legumes equals ½ cup after being cooked, one serving of nuts equals 1½ ounces, and one serving of seeds equals ½ ounce or 1 tablespoon.  · You may need to monitor your salt (sodium) intake, especially if you have high blood pressure. Talk with your health care provider or dietitian to get more information about reducing sodium.  What foods can I eat?  Grains    Breads, including Maldivian, white, xi, wheat, raisin, rye, oatmeal, and Italian. Tortillas that are neither fried nor made with lard or trans fat. Low-fat rolls, including hotdog and hamburger buns and English muffins. Biscuits. Muffins. Waffles. Pancakes. " Light popcorn. Whole-grain cereals. Flatbread. Laura toast. Pretzels. Breadsticks. Rusks. Low-fat snacks and crackers, including oyster, saltine, matzo, vibha, animal, and rye. Rice and pasta, including brown rice and those that are made with whole wheat.  Vegetables  All vegetables.  Fruits  All fruits, but limit coconut.  Meats and Other Protein Sources  Lean, well-trimmed beef, veal, pork, and lamb. Chicken and turkey without skin. All fish and shellfish. Wild duck, rabbit, pheasant, and venison. Egg whites or low-cholesterol egg substitutes. Dried beans, peas, lentils, and tofu. Seeds and most nuts.  Dairy  Low-fat or nonfat cheeses, including ricotta, string, and mozzarella. Skim or 1% milk that is liquid, powdered, or evaporated. Buttermilk that is made with low-fat milk. Nonfat or low-fat yogurt.  Beverages  Mineral water. Diet carbonated beverages.  Sweets and Desserts  Sherbets and fruit ices. Honey, jam, marmalade, jelly, and syrups. Meringues and gelatins. Pure sugar candy, such as hard candy, jelly beans, gumdrops, mints, marshmallows, and small amounts of dark chocolate. Delgado food cake.  Eat all sweets and desserts in moderation.  Fats and Oils  Nonhydrogenated (trans-free) margarines. Vegetable oils, including soybean, sesame, sunflower, olive, peanut, safflower, corn, canola, and cottonseed. Salad dressings or mayonnaise that are made with a vegetable oil. Limit added fats and oils that you use for cooking, baking, salads, and as spreads.  Other  Cocoa powder. Coffee and tea. All seasonings and condiments.  The items listed above may not be a complete list of recommended foods or beverages. Contact your dietitian for more options.  What foods are not recommended?  Grains  Breads that are made with saturated or trans fats, oils, or whole milk. Croissants. Butter rolls. Cheese breads. Sweet rolls. Donuts. Buttered popcorn. Chow mein noodles. High-fat crackers, such as cheese or butter crackers.  Meats  and Other Protein Sources  Fatty meats, such as hotdogs, short ribs, sausage, spareribs, jones, ribeye roast or steak, and mutton. High-fat deli meats, such as salami and bologna. Caviar. Domestic duck and goose. Organ meats, such as kidney, liver, sweetbreads, brains, gizzard, chitterlings, and heart.  Dairy  Cream, sour cream, cream cheese, and creamed cottage cheese. Whole milk cheeses, including blue (tati), Barron Lefty, Brie, Tam, American, Havarti, Swiss, cheddar, Camembert, and Vantage. Whole or 2% milk that is liquid, evaporated, or condensed. Whole buttermilk. Cream sauce or high-fat cheese sauce. Yogurt that is made from whole milk.  Beverages  Regular sodas and drinks with added sugar.  Sweets and Desserts  Frosting. Pudding. Cookies. Cakes other than merary food cake. Candy that has milk chocolate or white chocolate, hydrogenated fat, butter, coconut, or unknown ingredients. Buttered syrups. Full-fat ice cream or ice cream drinks.  Fats and Oils  Gravy that has suet, meat fat, or shortening. Cocoa butter, hydrogenated oils, palm oil, coconut oil, palm kernel oil. These can often be found in baked products, candy, fried foods, nondairy creamers, and whipped toppings. Solid fats and shortenings, including jones fat, salt pork, lard, and butter. Nondairy cream substitutes, such as coffee creamers and sour cream substitutes. Salad dressings that are made of unknown oils, cheese, or sour cream.  The items listed above may not be a complete list of foods and beverages to avoid. Contact your dietitian for more information.  This information is not intended to replace advice given to you by your health care provider. Make sure you discuss any questions you have with your health care provider.  Document Released: 09/26/2009 Document Revised: 07/07/2017 Document Reviewed: 06/11/2015  [employer] Interactive Patient Education © 2018 [employer]    Exercising to Lose Weight  Exercising can help you to lose  weight. In order to lose weight through exercise, you need to do vigorous-intensity exercise. You can tell that you are exercising with vigorous intensity if you are breathing very hard and fast and cannot hold a conversation while exercising.  Moderate-intensity exercise helps to maintain your current weight. You can tell that you are exercising at a moderate level if you have a higher heart rate and faster breathing, but you are still able to hold a conversation.  How often should I exercise?  Choose an activity that you enjoy and set realistic goals. Your health care provider can help you to make an activity plan that works for you. Exercise regularly as directed by your health care provider. This may include:  · Doing resistance training twice each week, such as:  ? Push-ups.  ? Sit-ups.  ? Lifting weights.  ? Using resistance bands.  · Doing a given intensity of exercise for a given amount of time. Choose from these options:  ? 150 minutes of moderate-intensity exercise every week.  ? 75 minutes of vigorous-intensity exercise every week.  ? A mix of moderate-intensity and vigorous-intensity exercise every week.    Children, pregnant women, people who are out of shape, people who are overweight, and older adults may need to consult a health care provider for individual recommendations. If you have any sort of medical condition, be sure to consult your health care provider before starting a new exercise program.  What are some activities that can help me to lose weight?  · Walking at a rate of at least 4.5 miles an hour.  · Jogging or running at a rate of 5 miles per hour.  · Biking at a rate of at least 10 miles per hour.  · Lap swimming.  · Roller-skating or in-line skating.  · Cross-country skiing.  · Vigorous competitive sports, such as football, basketball, and soccer.  · Jumping rope.  · Aerobic dancing.  How can I be more active in my day-to-day activities?  · Use the stairs instead of the  elevator.  · Take a walk during your lunch break.  · If you drive, park your car farther away from work or school.  · If you take public transportation, get off one stop early and walk the rest of the way.  · Make all of your phone calls while standing up and walking around.  · Get up, stretch, and walk around every 30 minutes throughout the day.  What guidelines should I follow while exercising?  · Do not exercise so much that you hurt yourself, feel dizzy, or get very short of breath.  · Consult your health care provider prior to starting a new exercise program.  · Wear comfortable clothes and shoes with good support.  · Drink plenty of water while you exercise to prevent dehydration or heat stroke. Body water is lost during exercise and must be replaced.  · Work out until you breathe faster and your heart beats faster.  This information is not intended to replace advice given to you by your health care provider. Make sure you discuss any questions you have with your health care provider.  Document Released: 01/20/2012 Document Revised: 05/25/2017 Document Reviewed: 05/21/2015  [employer] Interactive Patient Education © 2018 [employer]

## 2019-01-10 ENCOUNTER — TELEPHONE (OUTPATIENT)
Dept: INTERNAL MEDICINE | Facility: CLINIC | Age: 74
End: 2019-01-10

## 2019-03-05 ENCOUNTER — LAB (OUTPATIENT)
Dept: LAB | Facility: HOSPITAL | Age: 74
End: 2019-03-05

## 2019-03-05 ENCOUNTER — TRANSCRIBE ORDERS (OUTPATIENT)
Dept: LAB | Facility: HOSPITAL | Age: 74
End: 2019-03-05

## 2019-03-05 DIAGNOSIS — E78.00 PURE HYPERCHOLESTEROLEMIA: Primary | ICD-10-CM

## 2019-03-05 DIAGNOSIS — E78.00 PURE HYPERCHOLESTEROLEMIA: ICD-10-CM

## 2019-03-05 LAB
ALBUMIN SERPL-MCNC: 4.52 G/DL (ref 3.2–4.8)
ALP SERPL-CCNC: 58 U/L (ref 25–100)
ALT SERPL W P-5'-P-CCNC: 23 U/L (ref 7–40)
ARTICHOKE IGE QN: 64 MG/DL (ref 0–130)
AST SERPL-CCNC: 23 U/L (ref 0–33)
BILIRUB CONJ SERPL-MCNC: 0.1 MG/DL (ref 0–0.2)
BILIRUB INDIRECT SERPL-MCNC: 0.3 MG/DL (ref 0.1–1.1)
BILIRUB SERPL-MCNC: 0.4 MG/DL (ref 0.3–1.2)
CHOLEST SERPL-MCNC: 119 MG/DL (ref 0–200)
HDLC SERPL-MCNC: 43 MG/DL (ref 40–60)
PROT SERPL-MCNC: 6.8 G/DL (ref 5.7–8.2)
TRIGL SERPL-MCNC: 102 MG/DL (ref 0–150)

## 2019-03-05 PROCEDURE — 80061 LIPID PANEL: CPT | Performed by: INTERNAL MEDICINE

## 2019-03-05 PROCEDURE — 36415 COLL VENOUS BLD VENIPUNCTURE: CPT

## 2019-03-05 PROCEDURE — 80076 HEPATIC FUNCTION PANEL: CPT

## 2019-07-08 ENCOUNTER — OFFICE VISIT (OUTPATIENT)
Dept: INTERNAL MEDICINE | Facility: CLINIC | Age: 74
End: 2019-07-08

## 2019-07-08 VITALS
BODY MASS INDEX: 29.34 KG/M2 | TEMPERATURE: 97.3 F | HEART RATE: 72 BPM | SYSTOLIC BLOOD PRESSURE: 130 MMHG | DIASTOLIC BLOOD PRESSURE: 78 MMHG | WEIGHT: 180.38 LBS | RESPIRATION RATE: 20 BRPM

## 2019-07-08 DIAGNOSIS — E78.49 OTHER HYPERLIPIDEMIA: ICD-10-CM

## 2019-07-08 DIAGNOSIS — E04.1 SOLITARY NODULE OF RIGHT LOBE OF THYROID: ICD-10-CM

## 2019-07-08 DIAGNOSIS — K63.5 BENIGN COLON POLYP: ICD-10-CM

## 2019-07-08 DIAGNOSIS — Z23 NEED FOR PNEUMOCOCCAL VACCINATION: ICD-10-CM

## 2019-07-08 DIAGNOSIS — Z87.891 PERSONAL HISTORY OF TOBACCO USE, PRESENTING HAZARDS TO HEALTH: ICD-10-CM

## 2019-07-08 DIAGNOSIS — I10 ESSENTIAL HYPERTENSION: Primary | ICD-10-CM

## 2019-07-08 LAB
ALBUMIN SERPL-MCNC: 4.2 G/DL (ref 3.5–5.2)
ALBUMIN/GLOB SERPL: 1.3 G/DL
ALP SERPL-CCNC: 56 U/L (ref 39–117)
ALT SERPL W P-5'-P-CCNC: 16 U/L (ref 1–41)
ANION GAP SERPL CALCULATED.3IONS-SCNC: 13.4 MMOL/L (ref 5–15)
AST SERPL-CCNC: 19 U/L (ref 1–40)
BASOPHILS # BLD AUTO: 0.06 10*3/MM3 (ref 0–0.2)
BASOPHILS NFR BLD AUTO: 0.8 % (ref 0–1.5)
BILIRUB SERPL-MCNC: 0.4 MG/DL (ref 0.2–1.2)
BUN BLD-MCNC: 18 MG/DL (ref 8–23)
BUN/CREAT SERPL: 21.7 (ref 7–25)
CALCIUM SPEC-SCNC: 9.7 MG/DL (ref 8.6–10.5)
CHLORIDE SERPL-SCNC: 103 MMOL/L (ref 98–107)
CHOLEST SERPL-MCNC: 134 MG/DL (ref 0–200)
CLARITY, POC: CLEAR
CO2 SERPL-SCNC: 24.6 MMOL/L (ref 22–29)
COLOR UR: YELLOW
CREAT BLD-MCNC: 0.83 MG/DL (ref 0.76–1.27)
DEPRECATED RDW RBC AUTO: 41.5 FL (ref 37–54)
EOSINOPHIL # BLD AUTO: 0.33 10*3/MM3 (ref 0–0.4)
EOSINOPHIL NFR BLD AUTO: 4.4 % (ref 0.3–6.2)
ERYTHROCYTE [DISTWIDTH] IN BLOOD BY AUTOMATED COUNT: 13.2 % (ref 12.3–15.4)
EXPIRATION DATE: ABNORMAL
GFR SERPL CREATININE-BSD FRML MDRD: 91 ML/MIN/1.73
GLOBULIN UR ELPH-MCNC: 3.2 GM/DL
GLUCOSE BLD-MCNC: 99 MG/DL (ref 65–99)
GLUCOSE UR STRIP-MCNC: NEGATIVE MG/DL
HCT VFR BLD AUTO: 46 % (ref 37.5–51)
HDLC SERPL-MCNC: 49 MG/DL (ref 40–60)
HGB BLD-MCNC: 14.6 G/DL (ref 13–17.7)
IMM GRANULOCYTES # BLD AUTO: 0.03 10*3/MM3 (ref 0–0.05)
IMM GRANULOCYTES NFR BLD AUTO: 0.4 % (ref 0–0.5)
KETONES UR QL: ABNORMAL
LDLC SERPL CALC-MCNC: 58 MG/DL (ref 0–100)
LDLC/HDLC SERPL: 1.19 {RATIO}
LEUKOCYTE EST, POC: NEGATIVE
LYMPHOCYTES # BLD AUTO: 2.21 10*3/MM3 (ref 0.7–3.1)
LYMPHOCYTES NFR BLD AUTO: 29.5 % (ref 19.6–45.3)
Lab: ABNORMAL
MCH RBC QN AUTO: 27.4 PG (ref 26.6–33)
MCHC RBC AUTO-ENTMCNC: 31.7 G/DL (ref 31.5–35.7)
MCV RBC AUTO: 86.5 FL (ref 79–97)
MONOCYTES # BLD AUTO: 0.74 10*3/MM3 (ref 0.1–0.9)
MONOCYTES NFR BLD AUTO: 9.9 % (ref 5–12)
NEUTROPHILS # BLD AUTO: 4.11 10*3/MM3 (ref 1.7–7)
NEUTROPHILS NFR BLD AUTO: 55 % (ref 42.7–76)
NITRITE UR-MCNC: NEGATIVE MG/ML
NRBC BLD AUTO-RTO: 0 /100 WBC (ref 0–0.2)
PH UR: 5.5 [PH] (ref 5–8)
PLATELET # BLD AUTO: 189 10*3/MM3 (ref 140–450)
PMV BLD AUTO: 11.2 FL (ref 6–12)
POTASSIUM BLD-SCNC: 4.5 MMOL/L (ref 3.5–5.2)
PROT SERPL-MCNC: 7.4 G/DL (ref 6–8.5)
PROT UR STRIP-MCNC: NEGATIVE MG/DL
PROT/CREAT UR: 300 MG/G CREA
RBC # BLD AUTO: 5.32 10*6/MM3 (ref 4.14–5.8)
RBC # UR STRIP: ABNORMAL /UL
SODIUM BLD-SCNC: 141 MMOL/L (ref 136–145)
SP GR UR: 1.02 (ref 1–1.03)
TRIGL SERPL-MCNC: 134 MG/DL (ref 0–150)
VLDLC SERPL-MCNC: 26.8 MG/DL (ref 5–40)
WBC NRBC COR # BLD: 7.48 10*3/MM3 (ref 3.4–10.8)

## 2019-07-08 PROCEDURE — 36415 COLL VENOUS BLD VENIPUNCTURE: CPT | Performed by: INTERNAL MEDICINE

## 2019-07-08 PROCEDURE — 90732 PPSV23 VACC 2 YRS+ SUBQ/IM: CPT | Performed by: INTERNAL MEDICINE

## 2019-07-08 PROCEDURE — 81003 URINALYSIS AUTO W/O SCOPE: CPT | Performed by: INTERNAL MEDICINE

## 2019-07-08 PROCEDURE — 99214 OFFICE O/P EST MOD 30 MIN: CPT | Performed by: INTERNAL MEDICINE

## 2019-07-08 PROCEDURE — 85025 COMPLETE CBC W/AUTO DIFF WBC: CPT | Performed by: INTERNAL MEDICINE

## 2019-07-08 PROCEDURE — G0009 ADMIN PNEUMOCOCCAL VACCINE: HCPCS | Performed by: INTERNAL MEDICINE

## 2019-07-08 PROCEDURE — 80053 COMPREHEN METABOLIC PANEL: CPT | Performed by: INTERNAL MEDICINE

## 2019-07-08 PROCEDURE — 80061 LIPID PANEL: CPT | Performed by: INTERNAL MEDICINE

## 2019-07-08 RX ORDER — LOSARTAN POTASSIUM AND HYDROCHLOROTHIAZIDE 12.5; 5 MG/1; MG/1
1 TABLET ORAL DAILY
Qty: 30 TABLET | Refills: 5 | Status: SHIPPED | OUTPATIENT
Start: 2019-07-08 | End: 2020-01-08 | Stop reason: CLARIF

## 2019-07-08 RX ORDER — ROSUVASTATIN CALCIUM 20 MG/1
40 TABLET, COATED ORAL DAILY
Qty: 30 TABLET | Refills: 5 | Status: SHIPPED | OUTPATIENT
Start: 2019-07-08 | End: 2019-07-08 | Stop reason: SDUPTHER

## 2019-07-08 RX ORDER — ROSUVASTATIN CALCIUM 20 MG/1
20 TABLET, COATED ORAL DAILY
Qty: 30 TABLET | Refills: 5
Start: 2019-07-08 | End: 2020-03-02

## 2019-07-08 NOTE — PATIENT INSTRUCTIONS
I recommend Shingrix (new Shingles vaccine) and Hepatitis A vaccination at the pharmacy.    Please call if you would like to have a Cardiac CT Scan scheduled (information given today)

## 2019-07-08 NOTE — PROGRESS NOTES
John Ellis is a 73 y.o. male.     Chief Complaint   Patient presents with   • Hypertension     6 month follow up  fasting        History obtained from the patient.      History of Present Illness        The patient has seen Dr. Blank in the past for a right-sided Thyroid Nodule.  Ultrasound showed the nodule to be calcified, so biopsy was not recommended. He is due for follow-up.      He also has chronic allergy symptoms.  He states he saw an allergist in the past, who recommended immunotherapy.  He states he recently got some antibiotics for the symptoms and they did improve.     The patient sees Dr. Bernstein for Restless Leg Syndrome, stable on Gabapentin and Sinemet.     The patient states he sees Dr. Francisco, Urology, once per year.  He states his last PSA test was 1/4/19 (0.75).     Cardiac Follow-up:     His Hypertension is stable.    Medications:  Losartan / HCTZ.  His Hyperlipidemia is stable.    LDL goal less than 130.  Last LDL 64    Medication: Crestor.    The patient is adherent with his medications.  He reports medication side effects.  He is having decreased blood pressure late in the day with the Losartan/HCTZ.  He is having muscle aches with the Crestor.     Interval events: The patient sees Dr. Hanna for Hypertension, but does not want to see him anymore.  He states his blood pressure at home is 130s/60s in the morning, but decreases as the day goes on to 105-115/60s     Symptoms: Denies chest pain, shortness of breath, AQUINO, orthopnea, PND, palpitations, syncope, lower extremity edema, claudication, lightheadedness, and dizziness.    Associated symptoms:  No significant weight change.  Denies fatigue, headache, blurred or double vision, myalgias, arthralgias, polyuria, polydipsia, memory loss, concentration problems, and focal neurological deficit.     Lifestyle: The patient follows a diverse and healthy diet.  Exercise: None, but the patient states he is very active.  Tobacco  Use:  Former Smoker.     Colon Polyps Follow-up: The patient is here for follow-up of Colon Polyps which has been stable.  Interval events: Last Colonoscopy was done 3/28/17, sigmoid polyp.  Symptoms: Denies abdominal pain, diarrhea, constipation, hematochezia, melena, or changes in the stool.    Medication: None.    Current Outpatient Medications on File Prior to Visit   Medication Sig Dispense Refill   • carbidopa-levodopa (SINEMET)  MG per tablet 1 tablet Daily.     • gabapentin (NEURONTIN) 300 MG capsule 1 capsule 3 (Three) Times a Day.     • losartan-hydrochlorothiazide (HYZAAR) 100-12.5 MG per tablet 1 tablet Daily.     • rosuvastatin (CRESTOR) 40 MG tablet 1 tablet Daily.       No current facility-administered medications on file prior to visit.        Current outpatient and discharge medications have been reconciled for the patient.  Reviewed by: Salome Knott MD        The following portions of the patient's history were reviewed and updated as appropriate: allergies, current medications, past family history, past medical history, past social history, past surgical history and problem list.    Review of Systems   Constitutional: Negative for fatigue and unexpected weight change.   Eyes: Negative for visual disturbance.   Respiratory: Negative for cough, shortness of breath and wheezing.    Cardiovascular: Negative for chest pain, palpitations and leg swelling.        No AQUINO, orthopnea, or claudication.   Gastrointestinal: Negative for abdominal pain, blood in stool, constipation, diarrhea, nausea and vomiting.        Denies melena.   Endocrine: Negative for polydipsia and polyuria.   Musculoskeletal: Negative for arthralgias and myalgias.        Has bilateral knee pain.   Allergic/Immunologic: Positive for environmental allergies.   Neurological: Negative for dizziness, syncope, light-headedness and headaches.        No memory issues.   Psychiatric/Behavioral: Negative for decreased concentration.          Objective       Blood pressure 130/78, pulse 72, temperature 97.3 °F (36.3 °C), temperature source Temporal, resp. rate 20, weight 81.8 kg (180 lb 6 oz).      Physical Exam   Constitutional:   Overweight.   Neck: Normal range of motion. Neck supple. Carotid bruit is not present. No thyromegaly present.   Cardiovascular: Normal rate, regular rhythm, normal heart sounds and intact distal pulses. Exam reveals no gallop and no friction rub.   No murmur heard.  No peripheral edema.   Pulmonary/Chest: Effort normal and breath sounds normal.   Abdominal: Soft. Bowel sounds are normal. He exhibits no distension, no abdominal bruit and no mass. There is no hepatosplenomegaly. There is no tenderness.   Psychiatric: He has a normal mood and affect.   Nursing note and vitals reviewed.    Results for orders placed or performed in visit on 07/08/19   POC Urinalysis Dipstick, Multipro   Result Value Ref Range    Color Yellow Yellow, Straw, Dark Yellow, Jaycee    Clarity, UA Clear Clear    Glucose, UA Negative Negative, 1000 mg/dL (3+) mg/dL    Ketones, UA Trace (A) Negative    Specific Gravity  1.025 1.005 - 1.030    Blood, UA Trace (A) Negative    pH, Urine 5.5 5.0 - 8.0    Protein, POC Negative Negative mg/dL    Nitrite, UA Negative Negative    Leukocytes Negative Negative    Protein/Creatinine Ratio, Urine 300.0 mg/G Crea    Lot Number [id number]     Expiration Date 3-31-20        Assessment / Plan:  Jasiel was seen today for hypertension.    Diagnoses and all orders for this visit:    Essential hypertension  -     POC Urinalysis Dipstick, Multipro  -     Lipid Panel  -     Comprehensive Metabolic Panel  -     CBC & Differential  -     losartan-hydrochlorothiazide (HYZAAR) 50-12.5 MG per tablet; Take 1 tablet by mouth Daily (decreased dose).  -     CBC Auto Differential    Other hyperlipidemia  -     Lipid Panel  -     Comprehensive Metabolic Panel  -     CBC & Differential  -     Discontinue: rosuvastatin (CRESTOR) 20 MG  tablet; Take 2 tablets by mouth Daily.  -     CBC Auto Differential  -     rosuvastatin (CRESTOR) 20 MG tablet; Take 1 tablet by mouth Daily (decreased dose).    Benign colon polyp   Colonoscopy up-to-date.    Solitary nodule of right lobe of thyroid  -     Ambulatory Referral to ENT (Otolaryngology)    Need for pneumococcal vaccination  -     Pneumococcal Polysaccharide Vaccine 23-Valent Greater Than or Equal To 1yo Subcutaneous / IM    Personal history of tobacco use, presenting hazards to health  -     [place] aaa screen limited; Future      Recommended Shingrix (new Shingles vaccine) and Hepatitis A vaccination at the pharmacy.  Patient states he probably will not do this, despite my recommendation.    The patient agrees to call if he would like to have a Cardiac CT Scan scheduled (information given today).    The patient declined scheduling a Medicare Wellness Exam today.    The patient declined scheduling a CT Lung Cancer Screening today.        Return in about 6 months (around 1/8/2020) for Recheck HTN, fasting.

## 2019-07-15 ENCOUNTER — TELEPHONE (OUTPATIENT)
Dept: INTERNAL MEDICINE | Facility: CLINIC | Age: 74
End: 2019-07-15

## 2019-07-15 NOTE — TELEPHONE ENCOUNTER
----- Message from Lilliam Woodall sent at 7/15/2019  3:13 PM EDT -----  Patient states she went to her gastroenterologist and they checked her urine. She states it grew a fungus and she needs a referral to Infectious Disease. She doesn't know the name of the fungus, all she knows it the fungus is prone to [place]. She can be reached at [phone].

## 2019-07-15 NOTE — TELEPHONE ENCOUNTER
This message was intended for the wife Lula Ellis but she has appt tomorrow with gastro and they are taking care of it so she said to disregard message.

## 2019-07-16 ENCOUNTER — HOSPITAL ENCOUNTER (OUTPATIENT)
Dept: ULTRASOUND IMAGING | Facility: HOSPITAL | Age: 74
End: 2019-07-16

## 2019-07-23 ENCOUNTER — HOSPITAL ENCOUNTER (OUTPATIENT)
Dept: ULTRASOUND IMAGING | Facility: HOSPITAL | Age: 74
Discharge: HOME | End: 2019-07-23
Admitting: INTERNAL MEDICINE

## 2019-07-23 DIAGNOSIS — Z87.891 PERSONAL HISTORY OF TOBACCO USE, PRESENTING HAZARDS TO HEALTH: ICD-10-CM

## 2019-07-23 PROCEDURE — 76706 US ABDL AORTA SCREEN AAA: CPT

## 2019-07-26 ENCOUNTER — TELEPHONE (OUTPATIENT)
Dept: INTERNAL MEDICINE | Facility: CLINIC | Age: 74
End: 2019-07-26

## 2019-07-26 NOTE — TELEPHONE ENCOUNTER
----- Message from Ann Ramirez sent at 7/26/2019  1:37 PM EDT -----  Contact: Jasiel Moore would like to know the results from his AAA screening from 07/08/19.    He can be reached at [phone]

## 2019-07-29 NOTE — TELEPHONE ENCOUNTER
Lula (wife) informed that Cammy Augustine reviewed the abdominal ultrasound and Ion does not have an aneurysm. Verb understanding given.

## 2020-01-08 ENCOUNTER — OFFICE VISIT (OUTPATIENT)
Dept: INTERNAL MEDICINE | Facility: CLINIC | Age: 75
End: 2020-01-08

## 2020-01-08 VITALS
DIASTOLIC BLOOD PRESSURE: 78 MMHG | WEIGHT: 184 LBS | BODY MASS INDEX: 29.92 KG/M2 | RESPIRATION RATE: 20 BRPM | HEART RATE: 72 BPM | SYSTOLIC BLOOD PRESSURE: 140 MMHG

## 2020-01-08 DIAGNOSIS — E04.1 SOLITARY NODULE OF RIGHT LOBE OF THYROID: ICD-10-CM

## 2020-01-08 DIAGNOSIS — N40.0 BENIGN PROSTATIC HYPERPLASIA, UNSPECIFIED WHETHER LOWER URINARY TRACT SYMPTOMS PRESENT: ICD-10-CM

## 2020-01-08 DIAGNOSIS — K63.5 BENIGN COLON POLYP: ICD-10-CM

## 2020-01-08 DIAGNOSIS — I10 ESSENTIAL HYPERTENSION: Primary | ICD-10-CM

## 2020-01-08 DIAGNOSIS — E78.49 OTHER HYPERLIPIDEMIA: ICD-10-CM

## 2020-01-08 LAB
ALBUMIN SERPL-MCNC: 4.4 G/DL (ref 3.5–5.2)
ALBUMIN/GLOB SERPL: 1.3 G/DL
ALP SERPL-CCNC: 58 U/L (ref 39–117)
ALT SERPL W P-5'-P-CCNC: 13 U/L (ref 1–41)
ANION GAP SERPL CALCULATED.3IONS-SCNC: 12.4 MMOL/L (ref 5–15)
AST SERPL-CCNC: 17 U/L (ref 1–40)
BASOPHILS # BLD AUTO: 0.08 10*3/MM3 (ref 0–0.2)
BASOPHILS NFR BLD AUTO: 1 % (ref 0–1.5)
BILIRUB SERPL-MCNC: 0.5 MG/DL (ref 0.2–1.2)
BUN BLD-MCNC: 21 MG/DL (ref 8–23)
BUN/CREAT SERPL: 23.9 (ref 7–25)
CALCIUM SPEC-SCNC: 10.3 MG/DL (ref 8.6–10.5)
CHLORIDE SERPL-SCNC: 96 MMOL/L (ref 98–107)
CHOLEST SERPL-MCNC: 157 MG/DL (ref 0–200)
CLARITY, POC: CLEAR
CO2 SERPL-SCNC: 27.6 MMOL/L (ref 22–29)
COLOR UR: YELLOW
CREAT BLD-MCNC: 0.88 MG/DL (ref 0.76–1.27)
DEPRECATED RDW RBC AUTO: 38.4 FL (ref 37–54)
EOSINOPHIL # BLD AUTO: 0.31 10*3/MM3 (ref 0–0.4)
EOSINOPHIL NFR BLD AUTO: 3.8 % (ref 0.3–6.2)
ERYTHROCYTE [DISTWIDTH] IN BLOOD BY AUTOMATED COUNT: 12.5 % (ref 12.3–15.4)
EXPIRATION DATE: NORMAL
GFR SERPL CREATININE-BSD FRML MDRD: 85 ML/MIN/1.73
GLOBULIN UR ELPH-MCNC: 3.5 GM/DL
GLUCOSE BLD-MCNC: 108 MG/DL (ref 65–99)
GLUCOSE UR STRIP-MCNC: NEGATIVE MG/DL
HCT VFR BLD AUTO: 43 % (ref 37.5–51)
HDLC SERPL-MCNC: 46 MG/DL (ref 40–60)
HGB BLD-MCNC: 14.1 G/DL (ref 13–17.7)
IMM GRANULOCYTES # BLD AUTO: 0.03 10*3/MM3 (ref 0–0.05)
IMM GRANULOCYTES NFR BLD AUTO: 0.4 % (ref 0–0.5)
KETONES UR QL: NEGATIVE
LDLC SERPL CALC-MCNC: 78 MG/DL (ref 0–100)
LDLC/HDLC SERPL: 1.7 {RATIO}
LEUKOCYTE EST, POC: NEGATIVE
LYMPHOCYTES # BLD AUTO: 2.39 10*3/MM3 (ref 0.7–3.1)
LYMPHOCYTES NFR BLD AUTO: 29.5 % (ref 19.6–45.3)
Lab: NORMAL
MCH RBC QN AUTO: 27.6 PG (ref 26.6–33)
MCHC RBC AUTO-ENTMCNC: 32.8 G/DL (ref 31.5–35.7)
MCV RBC AUTO: 84.3 FL (ref 79–97)
MONOCYTES # BLD AUTO: 0.73 10*3/MM3 (ref 0.1–0.9)
MONOCYTES NFR BLD AUTO: 9 % (ref 5–12)
NEUTROPHILS # BLD AUTO: 4.55 10*3/MM3 (ref 1.7–7)
NEUTROPHILS NFR BLD AUTO: 56.3 % (ref 42.7–76)
NITRITE UR-MCNC: NEGATIVE MG/ML
NRBC BLD AUTO-RTO: 0 /100 WBC (ref 0–0.2)
PH UR: 5.5 [PH] (ref 5–8)
PLATELET # BLD AUTO: 196 10*3/MM3 (ref 140–450)
PMV BLD AUTO: 11.5 FL (ref 6–12)
POTASSIUM BLD-SCNC: 4.3 MMOL/L (ref 3.5–5.2)
PROT SERPL-MCNC: 7.9 G/DL (ref 6–8.5)
PROT UR STRIP-MCNC: NEGATIVE MG/DL
PROT/CREAT UR: 200 MG/G CREA
RBC # BLD AUTO: 5.1 10*6/MM3 (ref 4.14–5.8)
RBC # UR STRIP: NEGATIVE /UL
SODIUM BLD-SCNC: 136 MMOL/L (ref 136–145)
SP GR UR: 1.01 (ref 1–1.03)
T4 FREE SERPL-MCNC: 1.02 NG/DL (ref 0.93–1.7)
TRIGL SERPL-MCNC: 165 MG/DL (ref 0–150)
TSH SERPL DL<=0.05 MIU/L-ACNC: 4.99 UIU/ML (ref 0.27–4.2)
VLDLC SERPL-MCNC: 33 MG/DL (ref 5–40)
WBC NRBC COR # BLD: 8.09 10*3/MM3 (ref 3.4–10.8)

## 2020-01-08 PROCEDURE — 80061 LIPID PANEL: CPT | Performed by: INTERNAL MEDICINE

## 2020-01-08 PROCEDURE — 80053 COMPREHEN METABOLIC PANEL: CPT | Performed by: INTERNAL MEDICINE

## 2020-01-08 PROCEDURE — 99214 OFFICE O/P EST MOD 30 MIN: CPT | Performed by: INTERNAL MEDICINE

## 2020-01-08 PROCEDURE — 36415 COLL VENOUS BLD VENIPUNCTURE: CPT | Performed by: INTERNAL MEDICINE

## 2020-01-08 PROCEDURE — 85025 COMPLETE CBC W/AUTO DIFF WBC: CPT | Performed by: INTERNAL MEDICINE

## 2020-01-08 PROCEDURE — 84439 ASSAY OF FREE THYROXINE: CPT | Performed by: INTERNAL MEDICINE

## 2020-01-08 PROCEDURE — 81003 URINALYSIS AUTO W/O SCOPE: CPT | Performed by: INTERNAL MEDICINE

## 2020-01-08 PROCEDURE — 84443 ASSAY THYROID STIM HORMONE: CPT | Performed by: INTERNAL MEDICINE

## 2020-01-08 RX ORDER — HYDROCHLOROTHIAZIDE 12.5 MG/1
1 TABLET ORAL DAILY
COMMUNITY
Start: 2019-12-09 | End: 2020-06-03 | Stop reason: SINTOL

## 2020-01-08 RX ORDER — LOSARTAN POTASSIUM 50 MG/1
1 TABLET ORAL DAILY
COMMUNITY
Start: 2019-12-09 | End: 2020-06-03 | Stop reason: SDUPTHER

## 2020-01-08 NOTE — PATIENT INSTRUCTIONS
I recommend Shingrix (new Shingles vaccine), Td/Tdap vaccine (Tetanus booster), and Hepatitis A vaccination at the pharmacy.      Heart-Healthy Eating Plan  Many factors influence your heart (coronary) health, including eating and exercise habits. Coronary risk increases with abnormal blood fat (lipid) levels. Heart-healthy meal planning includes limiting unhealthy fats, increasing healthy fats, and making other diet and lifestyle changes.  What is my plan?  Your health care provider may recommend that you:  · Limit your fat intake to _________% or less of your total calories each day.  · Limit your saturated fat intake to _________% or less of your total calories each day.  · Limit the amount of cholesterol in your diet to less than _________ mg per day.  What are tips for following this plan?  Cooking  Cook foods using methods other than frying. Baking, boiling, grilling, and broiling are all good options. Other ways to reduce fat include:  · Removing the skin from poultry.  · Removing all visible fats from meats.  · Steaming vegetables in water or broth.  Meal planning    · At meals, imagine dividing your plate into fourths:  ? Fill one-half of your plate with vegetables and green salads.  ? Fill one-fourth of your plate with whole grains.  ? Fill one-fourth of your plate with lean protein foods.  · Eat 4-5 servings of vegetables per day. One serving equals 1 cup raw or cooked vegetable, or 2 cups raw leafy greens.  · Eat 4-5 servings of fruit per day. One serving equals 1 medium whole fruit, ¼ cup dried fruit, ½ cup fresh, frozen, or canned fruit, or ½ cup 100% fruit juice.  · Eat more foods that contain soluble fiber. Examples include apples, broccoli, carrots, beans, peas, and barley. Aim to get 25-30 g of fiber per day.  · Increase your consumption of legumes, nuts, and seeds to 4-5 servings per week. One serving of dried beans or legumes equals ½ cup cooked, 1 serving of nuts is ¼ cup, and 1 serving of seeds  equals 1 tablespoon.  Fats  · Choose healthy fats more often. Choose monounsaturated and polyunsaturated fats, such as olive and canola oils, flaxseeds, walnuts, almonds, and seeds.  · Eat more omega-3 fats. Choose salmon, mackerel, sardines, tuna, flaxseed oil, and ground flaxseeds. Aim to eat fish at least 2 times each week.  · Check food labels carefully to identify foods with trans fats or high amounts of saturated fat.  · Limit saturated fats. These are found in animal products, such as meats, butter, and cream. Plant sources of saturated fats include palm oil, palm kernel oil, and coconut oil.  · Avoid foods with partially hydrogenated oils in them. These contain trans fats. Examples are stick margarine, some tub margarines, cookies, crackers, and other baked goods.  · Avoid fried foods.  General information  · Eat more home-cooked food and less restaurant, buffet, and fast food.  · Limit or avoid alcohol.  · Limit foods that are high in starch and sugar.  · Lose weight if you are overweight. Losing just 5-10% of your body weight can help your overall health and prevent diseases such as diabetes and heart disease.  · Monitor your salt (sodium) intake, especially if you have high blood pressure. Talk with your health care provider about your sodium intake.  · Try to incorporate more vegetarian meals weekly.  What foods can I eat?  Fruits  All fresh, canned (in natural juice), or frozen fruits.  Vegetables  Fresh or frozen vegetables (raw, steamed, roasted, or grilled). Green salads.  Grains  Most grains. Choose whole wheat and whole grains most of the time. Rice and pasta, including brown rice and pastas made with whole wheat.  Meats and other proteins  Lean, well-trimmed beef, veal, pork, and lamb. Chicken and turkey without skin. All fish and shellfish. Wild duck, rabbit, pheasant, and venison. Egg whites or low-cholesterol egg substitutes. Dried beans, peas, lentils, and tofu. Seeds and most  nuts.  Dairy  Low-fat or nonfat cheeses, including ricotta and mozzarella. Skim or 1% milk (liquid, powdered, or evaporated). Buttermilk made with low-fat milk. Nonfat or low-fat yogurt.  Fats and oils  Non-hydrogenated (trans-free) margarines. Vegetable oils, including soybean, sesame, sunflower, olive, peanut, safflower, corn, canola, and cottonseed. Salad dressings or mayonnaise made with a vegetable oil.  Beverages  Water (mineral or sparkling). Coffee and tea. Diet carbonated beverages.  Sweets and desserts  Sherbet, gelatin, and fruit ice. Small amounts of dark chocolate.  Limit all sweets and desserts.  Seasonings and condiments  All seasonings and condiments.  The items listed above may not be a complete list of foods and beverages you can eat. Contact a dietitian for more options.  What foods are not recommended?  Fruits  Canned fruit in heavy syrup. Fruit in cream or butter sauce. Fried fruit. Limit coconut.  Vegetables  Vegetables cooked in cheese, cream, or butter sauce. Fried vegetables.  Grains  Breads made with saturated or trans fats, oils, or whole milk. Croissants. Sweet rolls. Donuts. High-fat crackers, such as cheese crackers.  Meats and other proteins  Fatty meats, such as hot dogs, ribs, sausage, jones, rib-eye roast or steak. High-fat deli meats, such as salami and bologna. Caviar. Domestic duck and goose. Organ meats, such as liver.  Dairy  Cream, sour cream, cream cheese, and creamed cottage cheese. Whole milk cheeses. Whole or 2% milk (liquid, evaporated, or condensed). Whole buttermilk. Cream sauce or high-fat cheese sauce. Whole-milk yogurt.  Fats and oils  Meat fat, or shortening. Cocoa butter, hydrogenated oils, palm oil, coconut oil, palm kernel oil. Solid fats and shortenings, including jones fat, salt pork, lard, and butter. Nondairy cream substitutes. Salad dressings with cheese or sour cream.  Beverages  Regular sodas and any drinks with added sugar.  Sweets and desserts  Frosting.  Pudding. Cookies. Cakes. Pies. Milk chocolate or white chocolate. Buttered syrups. Full-fat ice cream or ice cream drinks.  The items listed above may not be a complete list of foods and beverages to avoid. Contact a dietitian for more information.  Summary  · Heart-healthy meal planning includes limiting unhealthy fats, increasing healthy fats, and making other diet and lifestyle changes.  · Lose weight if you are overweight. Losing just 5-10% of your body weight can help your overall health and prevent diseases such as diabetes and heart disease.  · Focus on eating a balance of foods, including fruits and vegetables, low-fat or nonfat dairy, lean protein, nuts and legumes, whole grains, and heart-healthy oils and fats.  This information is not intended to replace advice given to you by your health care provider. Make sure you discuss any questions you have with your health care provider.  Document Released: 09/26/2009 Document Revised: 01/25/2019 Document Reviewed: 01/25/2019  [employer] Interactive Patient Education © 2019 [employer]      Exercising to Lose Weight  Exercise is structured, repetitive physical activity to improve fitness and health. Getting regular exercise is important for everyone. It is especially important if you are overweight. Being overweight increases your risk of heart disease, stroke, diabetes, high blood pressure, and several types of cancer. Reducing your calorie intake and exercising can help you lose weight.  Exercise is usually categorized as moderate or vigorous intensity. To lose weight, most people need to do a certain amount of moderate-intensity or vigorous-intensity exercise each week.  Moderate-intensity exercise    Moderate-intensity exercise is any activity that gets you moving enough to burn at least three times more energy (calories) than if you were sitting.  Examples of moderate exercise include:  · Walking a mile in 15 minutes.  · Doing light yard work.  · Biking at an  easy pace.  Most people should get at least 150 minutes (2 hours and 30 minutes) a week of moderate-intensity exercise to maintain their body weight.  Vigorous-intensity exercise  Vigorous-intensity exercise is any activity that gets you moving enough to burn at least six times more calories than if you were sitting. When you exercise at this intensity, you should be working hard enough that you are not able to carry on a conversation.  Examples of vigorous exercise include:  · Running.  · Playing a team sport, such as football, basketball, and soccer.  · Jumping rope.  Most people should get at least 75 minutes (1 hour and 15 minutes) a week of vigorous-intensity exercise to maintain their body weight.  How can exercise affect me?  When you exercise enough to burn more calories than you eat, you lose weight. Exercise also reduces body fat and builds muscle. The more muscle you have, the more calories you burn. Exercise also:  · Improves mood.  · Reduces stress and tension.  · Improves your overall fitness, flexibility, and endurance.  · Increases bone strength.  The amount of exercise you need to lose weight depends on:  · Your age.  · The type of exercise.  · Any health conditions you have.  · Your overall physical ability.  Talk to your health care provider about how much exercise you need and what types of activities are safe for you.  What actions can I take to lose weight?  Nutrition    · Make changes to your diet as told by your health care provider or diet and nutrition specialist (dietitian). This may include:  ? Eating fewer calories.  ? Eating more protein.  ? Eating less unhealthy fats.  ? Eating a diet that includes fresh fruits and vegetables, whole grains, low-fat dairy products, and lean protein.  ? Avoiding foods with added fat, salt, and sugar.  · Drink plenty of water while you exercise to prevent dehydration or heat stroke.  Activity  · Choose an activity that you enjoy and set realistic goals.  Your health care provider can help you make an exercise plan that works for you.  · Exercise at a moderate or vigorous intensity most days of the week.  ? The intensity of exercise may vary from person to person. You can tell how intense a workout is for you by paying attention to your breathing and heartbeat. Most people will notice their breathing and heartbeat get faster with more intense exercise.  · Do resistance training twice each week, such as:  ? Push-ups.  ? Sit-ups.  ? Lifting weights.  ? Using resistance bands.  · Getting short amounts of exercise can be just as helpful as long structured periods of exercise. If you have trouble finding time to exercise, try to include exercise in your daily routine.  ? Get up, stretch, and walk around every 30 minutes throughout the day.  ? Go for a walk during your lunch break.  ? Park your car farther away from your destination.  ? If you take public transportation, get off one stop early and walk the rest of the way.  ? Make phone calls while standing up and walking around.  ? Take the stairs instead of elevators or escalators.  · Wear comfortable clothes and shoes with good support.  · Do not exercise so much that you hurt yourself, feel dizzy, or get very short of breath.  Where to find more information  · [place] Department of Health and Human Services: www.hhs.gov  · Centers for Disease Control and Prevention (CDC): www.cdc.gov  Contact a health care provider:  · Before starting a new exercise program.  · If you have questions or concerns about your weight.  · If you have a medical problem that keeps you from exercising.  Get help right away if you have any of the following while exercising:  · Injury.  · Dizziness.  · Difficulty breathing or shortness of breath that does not go away when you stop exercising.  · Chest pain.  · Rapid heartbeat.  Summary  · Being overweight increases your risk of heart disease, stroke, diabetes, high blood pressure, and several types  of cancer.  · Losing weight happens when you burn more calories than you eat.  · Reducing the amount of calories you eat in addition to getting regular moderate or vigorous exercise each week helps you lose weight.  This information is not intended to replace advice given to you by your health care provider. Make sure you discuss any questions you have with your health care provider.  Document Released: 01/20/2012 Document Revised: 12/31/2018 Document Reviewed: 12/31/2018  [employer] Interactive Patient Education © 2019 [employer]

## 2020-01-08 NOTE — PROGRESS NOTES
John Ellis is a 74 y.o. male.     Chief Complaint   Patient presents with   • Hypertension     6 month follow up        History obtained from the patient.      History of Present Illness     The patient has seen Dr. Blank  for a right-sided Thyroid Nodule.  Ultrasound showed the nodule to be calcified, so biopsy was not recommended.  Last appointment was on 8/1/2019, but the patient states that thyroid ultrasound was not done..       The patient sees Dr. Jorgensen for Restless Leg Syndrome, stable on Gabapentin and Sinemet.     The patient states he sees Dr. Francisco, Urology, once per year, for BPH.  He states his last PSA test was 1/7/20, results pending.  The patient states he also had a normal urinalysis he is not on medication.     Cardiac Follow-up:     His Hypertension has been stable.    Medications:  Losartan and HCTZ.  His Hyperlipidemia has been stable.    LDL goal is <130.  Last LDL 58   Medication: Crestor.    The patient is adherent with his medications.   Medication Side Effects: Dizziness.    Interval events: The patient sees Dr. Hanna for Hypertension,  last visit 9/12/2019, but does not want to see him anymore.  He states his blood pressure at home has been 106-122 / 70-75.  He states his blood pressure at Dr. Francisco's office yesterday was 120s/70s.      Symptoms: Denies chest pain, shortness of breath, AQUINO, orthopnea, PND, palpitations, syncope, lower extremity edema, claudication, lightheadedness, and dizziness.    Associated Symptoms: Weight up 4 pounds since last visit.  Denies fatigue, headache,  myalgias, arthralgias, polyuria, polydipsia, visual impairment, memory loss, concentration problems, and focal neurological deficit.     Lifestyle: The patient follows a diverse and healthy diet.  Exercise: Walks daily.  Tobacco Use:  Former Smoker.     Colon Polyps Follow-up: The patient is here for follow-up of Colon Polyps which has been stable.  Family History: Brother diagnosed  with Stage IV Colon Cancer in his 40s.  Interval Events: Last Colonoscopy was done 3/28/17, sigmoid polyp.  Symptoms: Denies abdominal pain, diarrhea, constipation, hematochezia, melena, or changes in the stool.    Medication: None.    Current Outpatient Medications on File Prior to Visit   Medication Sig Dispense Refill   • carbidopa-levodopa (SINEMET)  MG per tablet 1 tablet Daily.     • gabapentin (NEURONTIN) 300 MG capsule 1 capsule 3 (Three) Times a Day.     • hydroCHLOROthiazide (HYDRODIURIL) 12.5 MG tablet 1 tablet Daily.     • losartan (COZAAR) 50 MG tablet 1 tablet Daily.     • rosuvastatin (CRESTOR) 20 MG tablet Take 1 tablet by mouth Daily. 30 tablet 5     No current facility-administered medications on file prior to visit.        Current outpatient and discharge medications have been reconciled for the patient.  Reviewed by: Salome Knott MD        The following portions of the patient's history were reviewed and updated as appropriate: allergies, current medications, past family history, past medical history, past social history, past surgical history and problem list.    Review of Systems   Constitutional: Positive for unexpected weight change. Negative for fatigue.   Eyes: Negative for visual disturbance.   Respiratory: Negative for cough, shortness of breath and wheezing.    Cardiovascular: Negative for chest pain, palpitations and leg swelling.        No AQUINO, orthopnea, or claudication.   Gastrointestinal: Negative for abdominal pain, blood in stool, constipation, diarrhea, nausea and vomiting.        Denies melena.   Endocrine: Negative for polydipsia and polyuria.   Musculoskeletal: Negative for arthralgias and myalgias.   Neurological: Positive for dizziness. Negative for syncope, light-headedness and headaches.        No memory issues.   Psychiatric/Behavioral: Negative for decreased concentration.         Objective       Blood pressure 140/78, pulse 72, resp. rate 20, weight 83.5 kg (184  lb).      Physical Exam   Constitutional:   Overweight.   Neck: Normal range of motion. Neck supple. Carotid bruit is not present. No thyromegaly present.   Cardiovascular: Normal rate, regular rhythm, normal heart sounds and intact distal pulses. Exam reveals no gallop and no friction rub.   No murmur heard.  No peripheral edema.   Pulmonary/Chest: Effort normal and breath sounds normal.   Abdominal: Soft. Bowel sounds are normal. He exhibits no distension, no abdominal bruit and no mass. There is no hepatosplenomegaly. There is no tenderness.   Psychiatric: He has a normal mood and affect.   Nursing note and vitals reviewed.    Results for orders placed or performed in visit on 01/08/20   POC Urinalysis Dipstick, Multipro   Result Value Ref Range    Color Yellow Yellow, Straw, Dark Yellow, Jaycee    Clarity, UA Clear Clear    Glucose, UA Negative Negative, 1000 mg/dL (3+) mg/dL    Ketones, UA Negative Negative    Specific Gravity  1.015 1.005 - 1.030    Blood, UA Negative Negative    pH, Urine 5.5 5.0 - 8.0    Protein, POC Negative Negative mg/dL    Nitrite, UA Negative Negative    Leukocytes Negative Negative    Protein/Creatinine Ratio, Urine 200.0 mg/G Crea    Lot Number [id number]     Expiration Date 4-30-20        Assessment / Plan:  Jasiel was seen today for hypertension.    Diagnoses and all orders for this visit:    Essential hypertension  -     Lipid Panel  -     Comprehensive Metabolic Panel  -     TSH  -     CBC & Differential  -     POC Urinalysis Dipstick, Multipro  -     CBC Auto Differential   Continue current medication(s) as noted in the history of present illness.   Recommended increasing Losartan to 100 mg daily.  The patient declined, stating his blood pressure is normal at home.    Other hyperlipidemia  -     Comprehensive Metabolic Panel  -     TSH  -     CBC & Differential  -     CBC Auto Differential   Continue current medication(s) as noted in the history of present illness.    Solitary nodule of  right lobe of thyroid  -     TSH  -     T4, Free  -     US Thyroid; Future   Will schedule follow-up with Dr. Blank pending ultrasound results.    Benign colon polyp   Colonoscopy up-to-date.    Benign prostatic hypertrophy   Follow-up with Dr. Francisco.        Recommended Shingrix (new Shingles vaccine) and Hepatitis A vaccination at the pharmacy.  The patient states he will not have these immunizations, despite my strong recommendation.    Recommended Influenza vaccine. The patient declined.  The patient was informed he could be hospitalized and die from Influenza infection.    The patient declined scheduling a Cardiac CT scan for Coronary Artery Disease screening today.    The patient declined scheduling a CT Chest for Lung Cancer Screening, despite my strong recommendation.    The patient refused to have a [CT Chest for Lung Cancer Screening  ].  The patient was informed that in my medical opinion, the tests are medically necessary, and failure to obtain the test may result in conditions that lead to death, inability to diagnose the condition, and progression of the condition.    The patient declined scheduling a Medicare Wellness Exam today.        Return in about 6 months (around 7/8/2020) for Recheck HTN, fasting.

## 2020-01-14 DIAGNOSIS — R79.89 ELEVATED TSH: Primary | ICD-10-CM

## 2020-01-14 DIAGNOSIS — E04.1 SOLITARY NODULE OF RIGHT LOBE OF THYROID: ICD-10-CM

## 2020-01-15 ENCOUNTER — HOSPITAL ENCOUNTER (OUTPATIENT)
Dept: ULTRASOUND IMAGING | Facility: HOSPITAL | Age: 75
Discharge: HOME | End: 2020-01-15
Admitting: INTERNAL MEDICINE

## 2020-01-15 DIAGNOSIS — E04.1 SOLITARY NODULE OF RIGHT LOBE OF THYROID: ICD-10-CM

## 2020-01-15 PROCEDURE — 76536 US EXAM OF HEAD AND NECK: CPT

## 2020-01-19 ENCOUNTER — TELEPHONE (OUTPATIENT)
Dept: INTERNAL MEDICINE | Facility: CLINIC | Age: 75
End: 2020-01-19

## 2020-01-19 NOTE — TELEPHONE ENCOUNTER
Please send a copy of the patient's thyroid u/s to Dr. Blank, ENT.  The patient was seen by him 8/1/19.

## 2020-01-20 ENCOUNTER — TELEPHONE (OUTPATIENT)
Dept: INTERNAL MEDICINE | Facility: CLINIC | Age: 75
End: 2020-01-20

## 2020-01-21 ENCOUNTER — TELEPHONE (OUTPATIENT)
Dept: INTERNAL MEDICINE | Facility: CLINIC | Age: 75
End: 2020-01-21

## 2020-01-21 DIAGNOSIS — R73.9 HYPERGLYCEMIA: Primary | ICD-10-CM

## 2020-01-21 NOTE — TELEPHONE ENCOUNTER
Patient called wanting to know if he should follow-up with Dr. Blank. He can be reached at [phone].

## 2020-01-21 NOTE — TELEPHONE ENCOUNTER
Notified Ion of the US report   He is going to follow up with Dr Blank   Verb understanding given

## 2020-03-02 ENCOUNTER — TELEPHONE (OUTPATIENT)
Dept: INTERNAL MEDICINE | Facility: CLINIC | Age: 75
End: 2020-03-02

## 2020-03-02 ENCOUNTER — CLINICAL SUPPORT (OUTPATIENT)
Dept: INTERNAL MEDICINE | Facility: CLINIC | Age: 75
End: 2020-03-02

## 2020-03-02 VITALS — DIASTOLIC BLOOD PRESSURE: 76 MMHG | SYSTOLIC BLOOD PRESSURE: 150 MMHG

## 2020-03-02 DIAGNOSIS — E78.49 OTHER HYPERLIPIDEMIA: ICD-10-CM

## 2020-03-02 RX ORDER — ROSUVASTATIN CALCIUM 20 MG/1
TABLET, COATED ORAL
Qty: 90 TABLET | Refills: 0 | Status: SHIPPED | OUTPATIENT
Start: 2020-03-02 | End: 2020-07-08 | Stop reason: SDUPTHER

## 2020-03-02 NOTE — TELEPHONE ENCOUNTER
Message to be addressed by Amna Mixon.    Per Amna, blood pressure 150/76, but the patient did not take his blood pressure medication last night.  Please document blood pressure in chart.    Have the patient discontinue his HCTZ.  He can continue the Losartan at the current dose.  He should monitor his blood pressure at home.    Have him take one half of the Crestor 20 mg tablet.  Would like him to schedule a 6-week follow-up appointment with me fasting to recheck blood pressure and lipid levels.

## 2020-03-02 NOTE — TELEPHONE ENCOUNTER
BP recorded in chart.   Patient notified of information from Dr Knott    Notified to bring a copy of his BP readings to his next appointment in 6 weeks.   Verb understanding given

## 2020-03-02 NOTE — TELEPHONE ENCOUNTER
He states the medication is causing muscle aches.  He is wanting to take the same medication with a lower dose .     His BP has bee in 105/50  He is c/o dizziness and he feels it is due to his BP blood pressure He states he takes the medication at night time .  His cardiologist proscribes the medication and he only sees a PA in his office and is wondering if Dr Knott could change the rx.     He states he takes the Hydrochlorot at night time and it makes him get up and use the BR at night.     He is taking the Losartan and Hydrochorot

## 2020-06-02 ENCOUNTER — TELEPHONE (OUTPATIENT)
Dept: INTERNAL MEDICINE | Facility: CLINIC | Age: 75
End: 2020-06-02

## 2020-06-02 NOTE — TELEPHONE ENCOUNTER
PT'S WIFE CALLED STATING THAT THE PT'S BLOOD PRESSURE MEDICATION DOESN'T WORK AND IT IS ACTING FUNNY. EVERYTHING THEY TRY IT DOESN'T WORK.    ABEL'S CONTACT [phone]     PLEASE ADVISE

## 2020-06-03 ENCOUNTER — OFFICE VISIT (OUTPATIENT)
Dept: INTERNAL MEDICINE | Facility: CLINIC | Age: 75
End: 2020-06-03

## 2020-06-03 VITALS
WEIGHT: 181.25 LBS | BODY MASS INDEX: 29.48 KG/M2 | SYSTOLIC BLOOD PRESSURE: 124 MMHG | TEMPERATURE: 97.6 F | DIASTOLIC BLOOD PRESSURE: 84 MMHG | HEART RATE: 84 BPM | RESPIRATION RATE: 20 BRPM

## 2020-06-03 DIAGNOSIS — E78.49 OTHER HYPERLIPIDEMIA: ICD-10-CM

## 2020-06-03 DIAGNOSIS — R73.9 HYPERGLYCEMIA: ICD-10-CM

## 2020-06-03 DIAGNOSIS — I10 ESSENTIAL HYPERTENSION: Primary | ICD-10-CM

## 2020-06-03 DIAGNOSIS — R79.89 ABNORMAL TSH: ICD-10-CM

## 2020-06-03 PROCEDURE — 99214 OFFICE O/P EST MOD 30 MIN: CPT | Performed by: INTERNAL MEDICINE

## 2020-06-03 RX ORDER — LOSARTAN POTASSIUM 50 MG/1
50 TABLET ORAL NIGHTLY
Qty: 90 TABLET | Refills: 1 | Status: SHIPPED | OUTPATIENT
Start: 2020-06-03 | End: 2020-07-11 | Stop reason: SDUPTHER

## 2020-06-03 NOTE — PROGRESS NOTES
John Ellis is a 74 y.o. male.     Chief Complaint   Patient presents with   • Hypertension       History obtained from the patient.      History of Present Illness     The patient is for an unscheduled follow-up appointment.  He is non-fasting.    Of note, his last labs on 1/8/2020 were significant for a mildly elevated TSH (4.99).  Lab orders were entered for a repeat TSH, as well as a T4, but the patient did not come in for those yet.    Cardiac Follow-up:     His Hypertension has been unstable.    Medications:  Losartan / HCTZ.  His Hyperlipidemia has been stable.    LDL goal is <130.  Last LDL 78,   Medication: Crestor.   The patient is adherent with his medications.   Medication Side Effects: None.     Interval events: The patient has seen Dr. Hanna for Hypertension, last visit 9/12/2019.  Last visit, the patient requested his Hypertension be followed here.  The patient was seen here on 1/8/2020 for routine medical follow-up.  His blood pressure was 140/78.  I did recommend increasing Losartan to 100 mg daily, but he declined stating his blood pressure have been normal at home.  Therefore, his losartan/HCTZ was continued at the current dose.  On 3/2/2020, the patient called the office complaining of nocturia.  His Hydrochlorothiazide was discontinued.  His Losartan was continued at 50 mg daily.  He states he then ran out of his Losartan.  Instead of calling here for a refill, he called Dr. Hanna, who was unaware of the medication changes.  Therefore, Losartan/HCTZ was called in to the pharmacy and the patient began taking that.  On 4/21/2020, the patient stopped the Losartan HCTZ due to low blood pressure.  He states his blood pressure then ran 120-128 / 60-80.  1 week ago he woke up feeling dizzy and had tinnitus.  Blood pressure was 146/85 and recheck was 178/85.  He we started the Losartan/HCTZ.  After restarting it his blood pressure ran 125-130 / 65-70.  Yesterday he called with  very low blood pressure, 96/64 and 85/53.  He was complaining of dizziness.    Of note, on 1/8/2020, fasting blood glucose was 108.  An order was entered for a Hemoglobin A1c, but the patient did not come in for that yet.    Of note, the patient was having some myalgias so he decreased his Crestor in half to 10 mg daily.  When that did not improve his myalgias, he increased the dose back to 20 mg daily.    Symptoms: Has had some lightheadedness and dizziness, but not always associated with his blood pressure fluctuations.  Denies chest pain, shortness of breath, AQUINO, orthopnea, PND, palpitations, syncope, lower extremity edema, and claudication.    Associated Symptoms: Weight down pounds since last visit.   Has some myalgias.  Denies fatigue, headache, arthralgias, polyuria, polydipsia, visual impairment, memory loss, concentration problems, and focal neurological deficit.     Lifestyle: The patient follows a diverse and healthy diet.  Exercise: Walks or bikes daily.  Tobacco Use:  Former Smoker.    Current Outpatient Medications on File Prior to Visit   Medication Sig Dispense Refill   • carbidopa-levodopa (SINEMET)  MG per tablet 1 tablet Daily.     • gabapentin (NEURONTIN) 300 MG capsule 1 capsule 2 (Two) Times a Day.     • rosuvastatin (CRESTOR) 20 MG tablet Take 1 tablet by mouth once daily 90 tablet 0     No current facility-administered medications on file prior to visit.        Current outpatient and discharge medications have been reconciled for the patient.  Reviewed by: Salome Knott MD        The following portions of the patient's history were reviewed and updated as appropriate: allergies, current medications, past family history, past medical history, past social history, past surgical history and problem list.    Review of Systems   Constitutional: Negative for fatigue and unexpected weight change.   HENT: Positive for tinnitus.    Eyes: Negative for visual disturbance.   Respiratory: Negative  for cough, shortness of breath and wheezing.    Cardiovascular: Negative for chest pain, palpitations and leg swelling.        No AQUINO, orthopnea, or claudication.   Gastrointestinal: Negative for abdominal pain, blood in stool, constipation, diarrhea, nausea and vomiting.        Denies melena.   Endocrine: Negative for polydipsia and polyuria.   Musculoskeletal: Positive for myalgias. Negative for arthralgias.   Neurological: Positive for dizziness and light-headedness. Negative for syncope and headaches.        No memory issues.   Psychiatric/Behavioral: Negative for decreased concentration.         Objective       Blood pressure 124/84, pulse 84, temperature 97.6 °F (36.4 °C), temperature source Temporal, resp. rate 20, weight 82.2 kg (181 lb 4 oz).  Patient's blood pressure cuff readings 132/84 and 140/80      Physical Exam   Constitutional:   Overweight.   Neck: Normal range of motion. Neck supple. Carotid bruit is not present. No thyromegaly present.   Cardiovascular: Normal rate, regular rhythm, normal heart sounds and intact distal pulses. Exam reveals no gallop and no friction rub.   No murmur heard.  No peripheral edema.   Pulmonary/Chest: Effort normal and breath sounds normal.   Abdominal: Soft. Bowel sounds are normal. He exhibits no distension, no abdominal bruit and no mass. There is no hepatosplenomegaly. There is no tenderness.   Psychiatric: He has a normal mood and affect.   Nursing note and vitals reviewed.      Assessment / Plan:  Jasiel was seen today for hypertension.    Diagnoses and all orders for this visit:    Essential hypertension  -     losartan (COZAAR) 50 MG tablet; Take 1 tablet by mouth Every Night.   HCTZ discontinued.  -     Lipid Panel; Future  -     Comprehensive Metabolic Panel; Future    Other hyperlipidemia  -     Lipid Panel; Future  -     Comprehensive Metabolic Panel; Future   Continue current medication(s) as noted in the history of present  illness.    Hyperglycemia   Hemoglobin A1c, previously ordered.    Abnormal TSH   T4 and TSH previously ordered.      The patient agrees to return fasting for all the above labs.      Return for Next scheduled follow up.

## 2020-06-03 NOTE — TELEPHONE ENCOUNTER
Lula- Spouse [phone]. Stated that Losartan 50-12 mg was prescribed to pt. He stopped taking x2mths ago due BP numbers 85/60. Started taking again 3 days ago and the same thing happened . BP 96/64, 85/53. Currently not taking medication.     Please advise?

## 2020-06-04 ENCOUNTER — LAB (OUTPATIENT)
Dept: INTERNAL MEDICINE | Facility: CLINIC | Age: 75
End: 2020-06-04

## 2020-06-04 DIAGNOSIS — E04.1 SOLITARY NODULE OF RIGHT LOBE OF THYROID: ICD-10-CM

## 2020-06-04 DIAGNOSIS — R79.89 ELEVATED TSH: ICD-10-CM

## 2020-06-04 DIAGNOSIS — I10 ESSENTIAL HYPERTENSION: ICD-10-CM

## 2020-06-04 DIAGNOSIS — R73.9 HYPERGLYCEMIA: ICD-10-CM

## 2020-06-04 DIAGNOSIS — E78.49 OTHER HYPERLIPIDEMIA: ICD-10-CM

## 2020-06-04 LAB
ALBUMIN SERPL-MCNC: 4.7 G/DL (ref 3.5–5.2)
ALBUMIN/GLOB SERPL: 1.5 G/DL
ALP SERPL-CCNC: 54 U/L (ref 39–117)
ALT SERPL W P-5'-P-CCNC: 19 U/L (ref 1–41)
ANION GAP SERPL CALCULATED.3IONS-SCNC: 10 MMOL/L (ref 5–15)
AST SERPL-CCNC: 24 U/L (ref 1–40)
BILIRUB SERPL-MCNC: 0.7 MG/DL (ref 0.2–1.2)
BUN BLD-MCNC: 22 MG/DL (ref 8–23)
BUN/CREAT SERPL: 23.9 (ref 7–25)
CALCIUM SPEC-SCNC: 9.9 MG/DL (ref 8.6–10.5)
CHLORIDE SERPL-SCNC: 97 MMOL/L (ref 98–107)
CHOLEST SERPL-MCNC: 176 MG/DL (ref 0–200)
CO2 SERPL-SCNC: 28 MMOL/L (ref 22–29)
CREAT BLD-MCNC: 0.92 MG/DL (ref 0.76–1.27)
EXPIRATION DATE: NORMAL
GFR SERPL CREATININE-BSD FRML MDRD: 81 ML/MIN/1.73
GLOBULIN UR ELPH-MCNC: 3.2 GM/DL
GLUCOSE BLD-MCNC: 114 MG/DL (ref 65–99)
HBA1C MFR BLD: 5.6 %
HDLC SERPL-MCNC: 43 MG/DL (ref 40–60)
LDLC SERPL CALC-MCNC: 106 MG/DL (ref 0–100)
LDLC/HDLC SERPL: 2.46 {RATIO}
Lab: NORMAL
POTASSIUM BLD-SCNC: 4.2 MMOL/L (ref 3.5–5.2)
PROT SERPL-MCNC: 7.9 G/DL (ref 6–8.5)
SODIUM BLD-SCNC: 135 MMOL/L (ref 136–145)
T4 FREE SERPL-MCNC: 1.12 NG/DL (ref 0.93–1.7)
TRIGL SERPL-MCNC: 137 MG/DL (ref 0–150)
TSH SERPL DL<=0.05 MIU/L-ACNC: 3.87 UIU/ML (ref 0.27–4.2)
VLDLC SERPL-MCNC: 27.4 MG/DL (ref 5–40)

## 2020-06-04 PROCEDURE — 84443 ASSAY THYROID STIM HORMONE: CPT | Performed by: INTERNAL MEDICINE

## 2020-06-04 PROCEDURE — 84439 ASSAY OF FREE THYROXINE: CPT | Performed by: INTERNAL MEDICINE

## 2020-06-04 PROCEDURE — 83036 HEMOGLOBIN GLYCOSYLATED A1C: CPT | Performed by: INTERNAL MEDICINE

## 2020-06-04 PROCEDURE — 80053 COMPREHEN METABOLIC PANEL: CPT | Performed by: INTERNAL MEDICINE

## 2020-06-04 PROCEDURE — 36415 COLL VENOUS BLD VENIPUNCTURE: CPT | Performed by: INTERNAL MEDICINE

## 2020-06-04 PROCEDURE — 36416 COLLJ CAPILLARY BLOOD SPEC: CPT | Performed by: INTERNAL MEDICINE

## 2020-06-04 PROCEDURE — 80061 LIPID PANEL: CPT | Performed by: INTERNAL MEDICINE

## 2020-07-08 ENCOUNTER — OFFICE VISIT (OUTPATIENT)
Dept: INTERNAL MEDICINE | Facility: CLINIC | Age: 75
End: 2020-07-08

## 2020-07-08 VITALS
TEMPERATURE: 97.7 F | HEART RATE: 64 BPM | WEIGHT: 181.5 LBS | RESPIRATION RATE: 20 BRPM | SYSTOLIC BLOOD PRESSURE: 130 MMHG | BODY MASS INDEX: 29.52 KG/M2 | DIASTOLIC BLOOD PRESSURE: 84 MMHG

## 2020-07-08 DIAGNOSIS — K63.5 BENIGN COLON POLYP: ICD-10-CM

## 2020-07-08 DIAGNOSIS — E78.49 OTHER HYPERLIPIDEMIA: ICD-10-CM

## 2020-07-08 DIAGNOSIS — I10 ESSENTIAL HYPERTENSION: Primary | ICD-10-CM

## 2020-07-08 PROCEDURE — 99214 OFFICE O/P EST MOD 30 MIN: CPT | Performed by: INTERNAL MEDICINE

## 2020-07-08 RX ORDER — ROSUVASTATIN CALCIUM 20 MG/1
20 TABLET, COATED ORAL DAILY
Qty: 90 TABLET | Refills: 3 | Status: SHIPPED | OUTPATIENT
Start: 2020-07-08 | End: 2021-07-16 | Stop reason: SDUPTHER

## 2020-07-08 NOTE — PROGRESS NOTES
John Ellis is a 74 y.o. male.     Chief Complaint   Patient presents with   • Hypertension     6 month follow up   fasting        History obtained from the patient.      History of Present Illness     The patient has seen Dr. Blank  for a right-sided Thyroid Nodule.  Ultrasound showed the nodule to be calcified, so biopsy was not recommended.  Last appointment was on 8/1/2019, but the patient states that thyroid ultrasound was not done..       The patient sees Dr. Jorgensen for Restless Leg Syndrome, stable on Gabapentin and Sinemet.     The patient sees Dr. Francisco, Urology, once per year, for BPH.  He states his last PSA test was 1/7/20, normal per patient report.  He denies urinary symptoms.    Cardiac Follow-up: The patient is here for a follow-up visit.  Fasting labs were done on 6/4/2020.     His Hypertension has been stable.    Medications:  Losartan.  His Hyperlipidemia has been stable.    LDL goal is <130.  Last .  Medication: Crestor.   The patient is adherent with his medications.   Medication Side Effects: None.     Interval events: The patient was seen in 6/3/2020.  His Losartan HCT was discontinued.  He was started on Losartan 50 mg daily.  He states he is only taking half a tablet due to dizziness when he took the entire tablet. Blood pressure at home has been 110-120 / 60-75 (log reviewed).     Symptoms:  Denies chest pain, shortness of breath, AQUINO, orthopnea, PND, palpitations, syncope, lower extremity edema, and claudication.    Associated Symptoms: Weight down 3 pounds in 6 months.   Denies fatigue, headache, myalgias, arthralgias, polyuria, polydipsia, visual impairment, memory loss, concentration problems, and focal neurological deficit.     Lifestyle: The patient follows a diverse and healthy diet.  Exercise: None.  He is active at work.  Tobacco Use:  Former Smoker.    Colon Polyps Follow-up: The patient is here for follow-up of Colon Polyps which has been  stable.  Family History: Brother diagnosed with Stage IV Colon Cancer in his 40s.  Interval Events: Last Colonoscopy was done 3/28/17, sigmoid polyp.  Symptoms: Denies abdominal pain, diarrhea, constipation, hematochezia, melena, or changes in the stool.    Medication: None.    Current Outpatient Medications on File Prior to Visit   Medication Sig Dispense Refill   • carbidopa-levodopa (SINEMET)  MG per tablet 1 tablet Daily.     • gabapentin (NEURONTIN) 300 MG capsule 1 capsule 2 (Two) Times a Day.     • [DISCONTINUED] losartan (COZAAR) 50 MG tablet Take 1 tablet by mouth Every Night. (Patient taking differently: Take 50 mg by mouth Every Night. Take .5 tablet daily) 90 tablet 1     No current facility-administered medications on file prior to visit.        Current outpatient and discharge medications have been reconciled for the patient.  Reviewed by: Salome Knott MD        The following portions of the patient's history were reviewed and updated as appropriate: allergies, current medications, past family history, past medical history, past social history, past surgical history and problem list.    Review of Systems   Constitutional: Negative for fatigue and unexpected weight change.   Eyes: Negative for visual disturbance.   Respiratory: Negative for cough, shortness of breath and wheezing.    Cardiovascular: Negative for chest pain, palpitations and leg swelling.        No AQUINO, orthopnea, or claudication.   Gastrointestinal: Negative for abdominal pain, blood in stool, constipation, diarrhea, nausea and vomiting.        Denies melena.   Endocrine: Negative for polydipsia and polyuria.   Genitourinary: Negative for dysuria, frequency, hematuria and urgency.   Musculoskeletal: Negative for arthralgias and myalgias.   Neurological: Negative for dizziness, syncope, light-headedness and headaches.        No memory issues.   Psychiatric/Behavioral: Negative for decreased concentration.         Objective        Blood pressure 130/84, pulse 64, temperature 97.7 °F (36.5 °C), temperature source Temporal, resp. rate 20, weight 82.3 kg (181 lb 8 oz).      Physical Exam   Constitutional:   Overweight.   Neck: Normal range of motion. Neck supple. Carotid bruit is not present. No thyromegaly present.   Cardiovascular: Normal rate, regular rhythm, normal heart sounds and intact distal pulses. Exam reveals no gallop and no friction rub.   No murmur heard.  No peripheral edema.   Pulmonary/Chest: Effort normal and breath sounds normal.   Abdominal: Soft. Bowel sounds are normal. He exhibits no distension, no abdominal bruit and no mass. There is no hepatosplenomegaly. There is no tenderness.   Psychiatric: He has a normal mood and affect.   Nursing note and vitals reviewed.    Advance Care Planning   ACP discussion was held with the patient during this visit. Patient does not have an advance directive, information provided.  ACP information pamphlet given to the patient.  ACP information provided on the AVS.    Assessment / Plan:  Ion was seen today for hypertension.    Diagnoses and all orders for this visit:    Essential hypertension  -     losartan (COZAAR) 50 MG tablet; Take 1 tablet by mouth Every Night (med list update).   Continue current medication(s) as noted in the history of present illness.    Other hyperlipidemia  -     rosuvastatin (CRESTOR) 20 MG tablet; Take 1 tablet by mouth Daily (refill).   Continue current medication(s) as noted in the history of present illness.     Benign colon polyp   Colonoscopy up-to-date.      Recommended Shingrix (new Shingles vaccine) and Hepatitis A vaccination at the pharmacy.  The patient states he will not have these immunizations, despite my strong recommendation.     The patient declined scheduling a CT Chest for Lung Cancer Screening, despite my strong recommendation.     The patient refused to have a [CT Chest for Lung Cancer Screening  ].  The patient was informed that in  my medical opinion, the tests are medically necessary, and failure to obtain the test may result in conditions that lead to death, inability to diagnose the condition, and progression of the condition.     The patient declined scheduling a Medicare Wellness Exam today.      Return in about 6 months (around 1/8/2021) for Recheck HTN, fasting.

## 2020-07-11 RX ORDER — LOSARTAN POTASSIUM 50 MG/1
50 TABLET ORAL NIGHTLY
Start: 2020-07-11 | End: 2021-05-04

## 2021-01-15 ENCOUNTER — OFFICE VISIT (OUTPATIENT)
Dept: INTERNAL MEDICINE | Facility: CLINIC | Age: 76
End: 2021-01-15

## 2021-01-15 VITALS
RESPIRATION RATE: 18 BRPM | HEART RATE: 78 BPM | BODY MASS INDEX: 29.32 KG/M2 | SYSTOLIC BLOOD PRESSURE: 158 MMHG | WEIGHT: 182.4 LBS | OXYGEN SATURATION: 98 % | HEIGHT: 66 IN | TEMPERATURE: 97.3 F | DIASTOLIC BLOOD PRESSURE: 74 MMHG

## 2021-01-15 DIAGNOSIS — N40.0 BENIGN PROSTATIC HYPERPLASIA, UNSPECIFIED WHETHER LOWER URINARY TRACT SYMPTOMS PRESENT: ICD-10-CM

## 2021-01-15 DIAGNOSIS — E04.1 SOLITARY NODULE OF RIGHT LOBE OF THYROID: ICD-10-CM

## 2021-01-15 DIAGNOSIS — K63.5 BENIGN COLON POLYP: ICD-10-CM

## 2021-01-15 DIAGNOSIS — I10 ESSENTIAL HYPERTENSION: Primary | ICD-10-CM

## 2021-01-15 DIAGNOSIS — E78.49 OTHER HYPERLIPIDEMIA: ICD-10-CM

## 2021-01-15 DIAGNOSIS — G25.81 RESTLESS LEGS SYNDROME (RLS): ICD-10-CM

## 2021-01-15 LAB
ALBUMIN SERPL-MCNC: 4.5 G/DL (ref 3.5–5.2)
ALBUMIN/GLOB SERPL: 1.5 G/DL
ALP SERPL-CCNC: 58 U/L (ref 39–117)
ALT SERPL W P-5'-P-CCNC: 14 U/L (ref 1–41)
ANION GAP SERPL CALCULATED.3IONS-SCNC: 10.4 MMOL/L (ref 5–15)
AST SERPL-CCNC: 21 U/L (ref 1–40)
BASOPHILS # BLD AUTO: 0.06 10*3/MM3 (ref 0–0.2)
BASOPHILS NFR BLD AUTO: 0.8 % (ref 0–1.5)
BILIRUB SERPL-MCNC: 0.5 MG/DL (ref 0–1.2)
BUN SERPL-MCNC: 17 MG/DL (ref 8–23)
BUN/CREAT SERPL: 18.9 (ref 7–25)
CALCIUM SPEC-SCNC: 9.6 MG/DL (ref 8.6–10.5)
CHLORIDE SERPL-SCNC: 101 MMOL/L (ref 98–107)
CHOLEST SERPL-MCNC: 117 MG/DL (ref 0–200)
CO2 SERPL-SCNC: 25.6 MMOL/L (ref 22–29)
CREAT SERPL-MCNC: 0.9 MG/DL (ref 0.76–1.27)
DEPRECATED RDW RBC AUTO: 37.6 FL (ref 37–54)
EOSINOPHIL # BLD AUTO: 0.2 10*3/MM3 (ref 0–0.4)
EOSINOPHIL NFR BLD AUTO: 2.5 % (ref 0.3–6.2)
ERYTHROCYTE [DISTWIDTH] IN BLOOD BY AUTOMATED COUNT: 12.5 % (ref 12.3–15.4)
GFR SERPL CREATININE-BSD FRML MDRD: 83 ML/MIN/1.73
GLOBULIN UR ELPH-MCNC: 3 GM/DL
GLUCOSE SERPL-MCNC: 98 MG/DL (ref 65–99)
HCT VFR BLD AUTO: 44.3 % (ref 37.5–51)
HDLC SERPL-MCNC: 47 MG/DL (ref 40–60)
HGB BLD-MCNC: 15.2 G/DL (ref 13–17.7)
IMM GRANULOCYTES # BLD AUTO: 0.04 10*3/MM3 (ref 0–0.05)
IMM GRANULOCYTES NFR BLD AUTO: 0.5 % (ref 0–0.5)
LDLC SERPL CALC-MCNC: 49 MG/DL (ref 0–100)
LDLC/HDLC SERPL: 1 {RATIO}
LYMPHOCYTES # BLD AUTO: 2.23 10*3/MM3 (ref 0.7–3.1)
LYMPHOCYTES NFR BLD AUTO: 28.1 % (ref 19.6–45.3)
MCH RBC QN AUTO: 28.6 PG (ref 26.6–33)
MCHC RBC AUTO-ENTMCNC: 34.3 G/DL (ref 31.5–35.7)
MCV RBC AUTO: 83.4 FL (ref 79–97)
MONOCYTES # BLD AUTO: 0.73 10*3/MM3 (ref 0.1–0.9)
MONOCYTES NFR BLD AUTO: 9.2 % (ref 5–12)
NEUTROPHILS NFR BLD AUTO: 4.68 10*3/MM3 (ref 1.7–7)
NEUTROPHILS NFR BLD AUTO: 58.9 % (ref 42.7–76)
NRBC BLD AUTO-RTO: 0 /100 WBC (ref 0–0.2)
PLATELET # BLD AUTO: 197 10*3/MM3 (ref 140–450)
PMV BLD AUTO: 11.3 FL (ref 6–12)
POTASSIUM SERPL-SCNC: 4.5 MMOL/L (ref 3.5–5.2)
PROT SERPL-MCNC: 7.5 G/DL (ref 6–8.5)
RBC # BLD AUTO: 5.31 10*6/MM3 (ref 4.14–5.8)
SODIUM SERPL-SCNC: 137 MMOL/L (ref 136–145)
TRIGL SERPL-MCNC: 115 MG/DL (ref 0–150)
VLDLC SERPL-MCNC: 21 MG/DL (ref 5–40)
WBC # BLD AUTO: 7.94 10*3/MM3 (ref 3.4–10.8)

## 2021-01-15 PROCEDURE — 85025 COMPLETE CBC W/AUTO DIFF WBC: CPT | Performed by: INTERNAL MEDICINE

## 2021-01-15 PROCEDURE — 80061 LIPID PANEL: CPT | Performed by: INTERNAL MEDICINE

## 2021-01-15 PROCEDURE — 99214 OFFICE O/P EST MOD 30 MIN: CPT | Performed by: INTERNAL MEDICINE

## 2021-01-15 PROCEDURE — 80053 COMPREHEN METABOLIC PANEL: CPT | Performed by: INTERNAL MEDICINE

## 2021-01-15 PROCEDURE — 36415 COLL VENOUS BLD VENIPUNCTURE: CPT | Performed by: INTERNAL MEDICINE

## 2021-01-15 NOTE — PATIENT INSTRUCTIONS
Please increase the Losartan to one full pill daily.    I recommend Shingrix (new Shingles vaccine) and Hepatitis A vaccination at the pharmacy.  Recommend Influenza vaccine.    Heart-Healthy Eating Plan  Many factors influence your heart (coronary) health, including eating and exercise habits. Coronary risk increases with abnormal blood fat (lipid) levels. Heart-healthy meal planning includes limiting unhealthy fats, increasing healthy fats, and making other diet and lifestyle changes.  What is my plan?  Your health care provider may recommend that you:  · Limit your fat intake to _________% or less of your total calories each day.  · Limit your saturated fat intake to _________% or less of your total calories each day.  · Limit the amount of cholesterol in your diet to less than _________ mg per day.  What are tips for following this plan?  Cooking  Cook foods using methods other than frying. Baking, boiling, grilling, and broiling are all good options. Other ways to reduce fat include:  · Removing the skin from poultry.  · Removing all visible fats from meats.  · Steaming vegetables in water or broth.  Meal planning    · At meals, imagine dividing your plate into fourths:  ? Fill one-half of your plate with vegetables and green salads.  ? Fill one-fourth of your plate with whole grains.  ? Fill one-fourth of your plate with lean protein foods.  · Eat 4-5 servings of vegetables per day. One serving equals 1 cup raw or cooked vegetable, or 2 cups raw leafy greens.  · Eat 4-5 servings of fruit per day. One serving equals 1 medium whole fruit, ¼ cup dried fruit, ½ cup fresh, frozen, or canned fruit, or ½ cup 100% fruit juice.  · Eat more foods that contain soluble fiber. Examples include apples, broccoli, carrots, beans, peas, and barley. Aim to get 25-30 g of fiber per day.  · Increase your consumption of legumes, nuts, and seeds to 4-5 servings per week. One serving of dried beans or legumes equals ½ cup cooked, 1  serving of nuts is ¼ cup, and 1 serving of seeds equals 1 tablespoon.  Fats  · Choose healthy fats more often. Choose monounsaturated and polyunsaturated fats, such as olive and canola oils, flaxseeds, walnuts, almonds, and seeds.  · Eat more omega-3 fats. Choose salmon, mackerel, sardines, tuna, flaxseed oil, and ground flaxseeds. Aim to eat fish at least 2 times each week.  · Check food labels carefully to identify foods with trans fats or high amounts of saturated fat.  · Limit saturated fats. These are found in animal products, such as meats, butter, and cream. Plant sources of saturated fats include palm oil, palm kernel oil, and coconut oil.  · Avoid foods with partially hydrogenated oils in them. These contain trans fats. Examples are stick margarine, some tub margarines, cookies, crackers, and other baked goods.  · Avoid fried foods.  General information  · Eat more home-cooked food and less restaurant, buffet, and fast food.  · Limit or avoid alcohol.  · Limit foods that are high in starch and sugar.  · Lose weight if you are overweight. Losing just 5-10% of your body weight can help your overall health and prevent diseases such as diabetes and heart disease.  · Monitor your salt (sodium) intake, especially if you have high blood pressure. Talk with your health care provider about your sodium intake.  · Try to incorporate more vegetarian meals weekly.  What foods can I eat?  Fruits  All fresh, canned (in natural juice), or frozen fruits.  Vegetables  Fresh or frozen vegetables (raw, steamed, roasted, or grilled). Green salads.  Grains  Most grains. Choose whole wheat and whole grains most of the time. Rice and pasta, including brown rice and pastas made with whole wheat.  Meats and other proteins  Lean, well-trimmed beef, veal, pork, and lamb. Chicken and turkey without skin. All fish and shellfish. Wild duck, rabbit, pheasant, and venison. Egg whites or low-cholesterol egg substitutes. Dried beans, peas,  lentils, and tofu. Seeds and most nuts.  Dairy  Low-fat or nonfat cheeses, including ricotta and mozzarella. Skim or 1% milk (liquid, powdered, or evaporated). Buttermilk made with low-fat milk. Nonfat or low-fat yogurt.  Fats and oils  Non-hydrogenated (trans-free) margarines. Vegetable oils, including soybean, sesame, sunflower, olive, peanut, safflower, corn, canola, and cottonseed. Salad dressings or mayonnaise made with a vegetable oil.  Beverages  Water (mineral or sparkling). Coffee and tea. Diet carbonated beverages.  Sweets and desserts  Sherbet, gelatin, and fruit ice. Small amounts of dark chocolate.  Limit all sweets and desserts.  Seasonings and condiments  All seasonings and condiments.  The items listed above may not be a complete list of foods and beverages you can eat. Contact a dietitian for more options.  What foods are not recommended?  Fruits  Canned fruit in heavy syrup. Fruit in cream or butter sauce. Fried fruit. Limit coconut.  Vegetables  Vegetables cooked in cheese, cream, or butter sauce. Fried vegetables.  Grains  Breads made with saturated or trans fats, oils, or whole milk. Croissants. Sweet rolls. Donuts. High-fat crackers, such as cheese crackers.  Meats and other proteins  Fatty meats, such as hot dogs, ribs, sausage, jones, rib-eye roast or steak. High-fat deli meats, such as salami and bologna. Caviar. Domestic duck and goose. Organ meats, such as liver.  Dairy  Cream, sour cream, cream cheese, and creamed cottage cheese. Whole milk cheeses. Whole or 2% milk (liquid, evaporated, or condensed). Whole buttermilk. Cream sauce or high-fat cheese sauce. Whole-milk yogurt.  Fats and oils  Meat fat, or shortening. Cocoa butter, hydrogenated oils, palm oil, coconut oil, palm kernel oil. Solid fats and shortenings, including jones fat, salt pork, lard, and butter. Nondairy cream substitutes. Salad dressings with cheese or sour cream.  Beverages  Regular sodas and any drinks with added  sugar.  Sweets and desserts  Frosting. Pudding. Cookies. Cakes. Pies. Milk chocolate or white chocolate. Buttered syrups. Full-fat ice cream or ice cream drinks.  The items listed above may not be a complete list of foods and beverages to avoid. Contact a dietitian for more information.  Summary  · Heart-healthy meal planning includes limiting unhealthy fats, increasing healthy fats, and making other diet and lifestyle changes.  · Lose weight if you are overweight. Losing just 5-10% of your body weight can help your overall health and prevent diseases such as diabetes and heart disease.  · Focus on eating a balance of foods, including fruits and vegetables, low-fat or nonfat dairy, lean protein, nuts and legumes, whole grains, and heart-healthy oils and fats.  This information is not intended to replace advice given to you by your health care provider. Make sure you discuss any questions you have with your health care provider.  Document Revised: 01/25/2019 Document Reviewed: 01/25/2019  [employer] Patient Education © 2020 [employer]      Exercising to Stay Healthy  To become healthy and stay healthy, it is recommended that you do moderate-intensity and vigorous-intensity exercise. You can tell that you are exercising at a moderate intensity if your heart starts beating faster and you start breathing faster but can still hold a conversation. You can tell that you are exercising at a vigorous intensity if you are breathing much harder and faster and cannot hold a conversation while exercising.  Exercising regularly is important. It has many health benefits, such as:  · Improving overall fitness, flexibility, and endurance.  · Increasing bone density.  · Helping with weight control.  · Decreasing body fat.  · Increasing muscle strength.  · Reducing stress and tension.  · Improving overall health.  How often should I exercise?  Choose an activity that you enjoy, and set realistic goals. Your health care provider can  help you make an activity plan that works for you.  Exercise regularly as told by your health care provider. This may include:  · Doing strength training two times a week, such as:  ? Lifting weights.  ? Using resistance bands.  ? Push-ups.  ? Sit-ups.  ? Yoga.  · Doing a certain intensity of exercise for a given amount of time. Choose from these options:  ? A total of 150 minutes of moderate-intensity exercise every week.  ? A total of 75 minutes of vigorous-intensity exercise every week.  ? A mix of moderate-intensity and vigorous-intensity exercise every week.  Children, pregnant women, people who have not exercised regularly, people who are overweight, and older adults may need to talk with a health care provider about what activities are safe to do. If you have a medical condition, be sure to talk with your health care provider before you start a new exercise program.  What are some exercise ideas?  Moderate-intensity exercise ideas include:  · Walking 1 mile (1.6 km) in about 15 minutes.  · Biking.  · Hiking.  · Golfing.  · Dancing.  · Water aerobics.  Vigorous-intensity exercise ideas include:  · Walking 4.5 miles (7.2 km) or more in about 1 hour.  · Jogging or running 5 miles (8 km) in about 1 hour.  · Biking 10 miles (16.1 km) or more in about 1 hour.  · Lap swimming.  · Roller-skating or in-line skating.  · Cross-country skiing.  · Vigorous competitive sports, such as football, basketball, and soccer.  · Jumping rope.  · Aerobic dancing.  What are some everyday activities that can help me to get exercise?  · Yard work, such as:  ? Pushing a .  ? Raking and bagging leaves.  · Washing your car.  · Pushing a stroller.  · Shoveling snow.  · Gardening.  · Washing windows or floors.  How can I be more active in my day-to-day activities?  · Use stairs instead of an elevator.  · Take a walk during your lunch break.  · If you drive, park your car farther away from your work or school.  · If you take  public transportation, get off one stop early and walk the rest of the way.  · Stand up or walk around during all of your indoor phone calls.  · Get up, stretch, and walk around every 30 minutes throughout the day.  · Enjoy exercise with a friend. Support to continue exercising will help you keep a regular routine of activity.  What guidelines can I follow while exercising?  · Before you start a new exercise program, talk with your health care provider.  · Do not exercise so much that you hurt yourself, feel dizzy, or get very short of breath.  · Wear comfortable clothes and wear shoes with good support.  · Drink plenty of water while you exercise to prevent dehydration or heat stroke.  · Work out until your breathing and your heartbeat get faster.  Where to find more information  · [place] Department of Health and Human Services: www.hhs.gov  · Centers for Disease Control and Prevention (CDC): www.cdc.gov  Summary  · Exercising regularly is important. It will improve your overall fitness, flexibility, and endurance.  · Regular exercise also will improve your overall health. It can help you control your weight, reduce stress, and improve your bone density.  · Do not exercise so much that you hurt yourself, feel dizzy, or get very short of breath.  · Before you start a new exercise program, talk with your health care provider.  This information is not intended to replace advice given to you by your health care provider. Make sure you discuss any questions you have with your health care provider.  Document Revised: 11/30/2018 Document Reviewed: 11/08/2018  [employer] Patient Education © 2020 [employer]

## 2021-01-15 NOTE — PROGRESS NOTES
John Ellis is a 75 y.o. male.     Chief Complaint   Patient presents with   • Hypertension     follow up       History obtained from the patient.      History of Present Illness     The patient has seen Dr. Blank  for a right-sided Thyroid Nodule.  Ultrasound showed the nodule to be calcified, so biopsy was not recommended.  Last ultrasound was on 1/8/2020, which showed a moderately enlarged right thyroid lobe and  Isthmus,  with a single calcified nodule in the inferior pole of the right lobe.     The patient sees Dr. Jorgensen for Restless Leg Syndrome, stable on Gabapentin and Sinemet.     The patient sees Dr. Francisco, Urology, once per year, for BPH.  He states his last appointment was 1/6/21.  He states u/a was chantelle,  PSA results are pending.  He denies urinary symptoms.     Cardiac Follow-up: The patient is here for a follow-up visit.       His Hypertension has been unstable.    Medications:  Losartan, but only taking 1/2 tablet daily.  His Hyperlipidemia has been stable.    LDL goal is <130.  Last .  Medication: Crestor.   The patient is adherent with his medications.   Medication Side Effects: None.     Interval events:  Blood pressure at home has been 115-127 / 60-70, but last week SBP went up to 170 (with normal DBP).  He got dizzy.     Symptoms: Episode of lightheadedness and dizziness as above, now resolved.  Denies chest pain, shortness of breath, AQUINO, orthopnea, PND, palpitations, syncope, lower extremity edema, and claudication.    Associated Symptoms: No significant weight change since last visit. . Denies fatigue, headache, myalgias, arthralgias, polyuria, polydipsia, visual impairment, memory loss, concentration problems, and focal neurological deficit.     Lifestyle: The patient follows a diverse and healthy diet.  Exercise: He walks daily.  He is active at work.  Tobacco Use:  Former Smoker.     Colon Polyps Follow-up: The patient is here for follow-up of Colon Polyps  which has been stable.  Family History: Brother diagnosed with Stage IV Colon Cancer in his 40s.  Interval Events: Last Colonoscopy was done 3/28/17, sigmoid polyp.  Symptoms: Denies abdominal pain, diarrhea, constipation, hematochezia, melena, or changes in the stool.    Medication: None.    Current Outpatient Medications on File Prior to Visit   Medication Sig Dispense Refill   • carbidopa-levodopa (SINEMET)  MG per tablet 1 tablet Daily.     • gabapentin (NEURONTIN) 300 MG capsule 1 capsule 2 (Two) Times a Day.     • losartan (COZAAR) 50 MG tablet Take 1 tablet by mouth Every Night.     • rosuvastatin (CRESTOR) 20 MG tablet Take 1 tablet by mouth Daily. 90 tablet 3     No current facility-administered medications on file prior to visit.        Current outpatient and discharge medications have been reconciled for the patient.  Reviewed by: Salome Knott MD        The following portions of the patient's history were reviewed and updated as appropriate: allergies, current medications, past family history, past medical history, past social history, past surgical history and problem list.    Review of Systems   Constitutional: Negative for fatigue and unexpected weight change.   Eyes: Negative for visual disturbance.   Respiratory: Negative for cough, shortness of breath and wheezing.    Cardiovascular: Negative for chest pain, palpitations and leg swelling.        No AQUINO, orthopnea, or claudication.   Gastrointestinal: Negative for abdominal pain, blood in stool, constipation, diarrhea, nausea and vomiting.        Denies melena.   Endocrine: Negative for polydipsia and polyuria.   Genitourinary: Negative for difficulty urinating, dysuria, frequency, hematuria, testicular pain and urgency.   Musculoskeletal: Positive for myalgias. Negative for arthralgias.   Neurological: Negative for dizziness, syncope, light-headedness and headaches.        No memory issues.   Psychiatric/Behavioral: Negative for decreased  "concentration.         Objective       Blood pressure 158/74, pulse 78, temperature 97.3 °F (36.3 °C), temperature source Temporal, resp. rate 18, height 167.6 cm (66\"), weight 82.7 kg (182 lb 6.4 oz), SpO2 98 %.      Physical Exam  Vitals signs and nursing note reviewed.   Constitutional:       Appearance: He is well-developed.      Comments: Overweight.   Neck:      Musculoskeletal: Normal range of motion and neck supple.      Thyroid: No thyroid mass or thyromegaly.      Vascular: No carotid bruit.   Cardiovascular:      Rate and Rhythm: Normal rate and regular rhythm.      Pulses: Normal pulses.      Heart sounds: Normal heart sounds. No murmur. No friction rub. No gallop.    Pulmonary:      Effort: Pulmonary effort is normal.      Breath sounds: Normal breath sounds.   Abdominal:      General: Bowel sounds are normal. There is no distension or abdominal bruit.      Palpations: Abdomen is soft. There is no hepatomegaly, splenomegaly or mass.      Tenderness: There is no abdominal tenderness.   Musculoskeletal:      Right lower leg: No edema.      Left lower leg: No edema.   Neurological:      Mental Status: He is alert.   Psychiatric:         Mood and Affect: Mood normal.         Assessment / Plan:  Diagnoses and all orders for this visit:    1. Essential hypertension (Primary)  -     Lipid Panel  -     Comprehensive Metabolic Panel  -     CBC & Differential  -     CBC Auto Differential   The patient was instructed to increase the Losartan to one full pill daily.    2. Other hyperlipidemia  -     Lipid Panel  -     Comprehensive Metabolic Panel  -     CBC & Differential  -     CBC Auto Differential   Continue current medication(s) as noted in the history of present illness.    3. Benign colon polyp   Colonoscopy up-to-date.    4. Benign prostatic hyperplasia, unspecified whether lower urinary tract symptoms present   Follow up per Dr. Francisco.   The patient agrees to send a copy of his recent urinalysis and PSA " tests done by Dr. Francisco.     5. Solitary nodule of right lobe of thyroid  -     US Thyroid; Future    6. Restless legs syndrome (RLS)   Continue current medication(s) as noted in the history of present illness.    Recommended Shingrix (new Shingles vaccine) and Hepatitis A vaccination at the pharmacy. The patient declines.  Recommended Influenza vaccine. The patient declined.  The patient was informed he could be hospitalized and die from Influenza infection.    The patient declined scheduling a Cardiac CT Scan.      Return in about 6 months (around 7/15/2021) for Recheck HTN, fasting.

## 2021-01-22 ENCOUNTER — HOSPITAL ENCOUNTER (OUTPATIENT)
Dept: ULTRASOUND IMAGING | Facility: HOSPITAL | Age: 76
Discharge: HOME | End: 2021-01-22
Admitting: INTERNAL MEDICINE

## 2021-01-22 DIAGNOSIS — E04.1 SOLITARY NODULE OF RIGHT LOBE OF THYROID: ICD-10-CM

## 2021-01-22 PROCEDURE — 76536 US EXAM OF HEAD AND NECK: CPT

## 2021-01-26 ENCOUNTER — TELEPHONE (OUTPATIENT)
Dept: INTERNAL MEDICINE | Facility: CLINIC | Age: 76
End: 2021-01-26

## 2021-01-26 NOTE — TELEPHONE ENCOUNTER
His ultrasound again showed the mildly suspicious right thyroid nodule, which had not changed from previous ultrasound.  I do recommend he follow-up with Dr. Blank regarding this.  He can schedule an appointment on his own, or I can put an order in for him.  He should bring a disc of the ultrasound with him to the appointment.

## 2021-01-26 NOTE — TELEPHONE ENCOUNTER
PATIENT CALLED STATING THAT HE WAS CONCERNED AND WOULD LIKE TO BE ADVISED AS SOON AS POSSIBLE CONCERNING THAT THE    PATIENT RECEIVED A ULTRASOUND ON HIS THYROID ON 01/22/21  PATIENT STATED HE WOULD LIKE THE RESULTS FROM THE TESTING AND ULTRASOUND.        PLEASE CALL AND ADVISE AS SOON AS POSSIBLE:    [phone]

## 2021-02-09 ENCOUNTER — TELEPHONE (OUTPATIENT)
Dept: INTERNAL MEDICINE | Facility: CLINIC | Age: 76
End: 2021-02-09

## 2021-02-09 NOTE — TELEPHONE ENCOUNTER
PATIENT IS TAKING rosuvastatin (CRESTOR) 20 MG tablet AND HE SAID THAT HE IS HAVING MUSCLE ACHES. HE IS WANTING TO KNOW IF HIS PCP CAN ALTER HIS DOSAGE OR SWITCH THIS TO HELP WITH HIS SYMPTOMS.     CONTACT: [phone]

## 2021-02-09 NOTE — TELEPHONE ENCOUNTER
Patient notified Dr Knott is out of office today and that Dr Knott would get back to him tomorrow  Verbal understanding given

## 2021-02-10 NOTE — TELEPHONE ENCOUNTER
Okay to take 1/2 tablet daily for 3 to 4 weeks.  Please have the patient call back at that time with an update.

## 2021-02-10 NOTE — TELEPHONE ENCOUNTER
I RELAYED MESSAGE TO PT. PT STATES THAT HE HAS NOT TAKEN COENZYMES AND DOES NOT WANT. PT STATES THAT HE WOULD LIKE MEDICATION rosuvastatin (CRESTOR) 20 MG tablet LOWERED OR TRY AN ALTERNATIVE MEDICATION TO SEE HOW THAT WOULD WORK    PT STATES THAT HE HAS PLENTY OF TABLETS AND WOULD LIKE TO KNOW IF HE CAN TAKE A 1/2 FOR A COUPLE OF WEEKS AND SEE HOW IT WORKS.  rosuvastatin (CRESTOR) 20 MG tablet    [address] [phone]  - [phone] FX      PLEASE ADVISE  [phone]

## 2021-02-10 NOTE — TELEPHONE ENCOUNTER
Has he tried taking Coenzyme Q 10 with this?  If no, would recommend he take this 3 times daily, and call back if that does not take care of the myalgias.

## 2021-05-04 DIAGNOSIS — I10 ESSENTIAL HYPERTENSION: ICD-10-CM

## 2021-05-04 RX ORDER — LOSARTAN POTASSIUM 50 MG/1
TABLET ORAL
Qty: 90 TABLET | Refills: 1 | Status: SHIPPED | OUTPATIENT
Start: 2021-05-04 | End: 2021-07-16 | Stop reason: SDUPTHER

## 2021-07-16 ENCOUNTER — OFFICE VISIT (OUTPATIENT)
Dept: INTERNAL MEDICINE | Facility: CLINIC | Age: 76
End: 2021-07-16

## 2021-07-16 VITALS
SYSTOLIC BLOOD PRESSURE: 121 MMHG | TEMPERATURE: 97.1 F | BODY MASS INDEX: 28.97 KG/M2 | RESPIRATION RATE: 20 BRPM | HEART RATE: 63 BPM | DIASTOLIC BLOOD PRESSURE: 78 MMHG | WEIGHT: 179.5 LBS

## 2021-07-16 DIAGNOSIS — N40.0 BENIGN PROSTATIC HYPERPLASIA, UNSPECIFIED WHETHER LOWER URINARY TRACT SYMPTOMS PRESENT: ICD-10-CM

## 2021-07-16 DIAGNOSIS — K63.5 BENIGN COLON POLYP: ICD-10-CM

## 2021-07-16 DIAGNOSIS — E04.1 SOLITARY NODULE OF RIGHT LOBE OF THYROID: ICD-10-CM

## 2021-07-16 DIAGNOSIS — I10 ESSENTIAL HYPERTENSION: Primary | ICD-10-CM

## 2021-07-16 DIAGNOSIS — E78.49 OTHER HYPERLIPIDEMIA: ICD-10-CM

## 2021-07-16 LAB
ALBUMIN SERPL-MCNC: 4.3 G/DL (ref 3.5–5.2)
ALBUMIN/GLOB SERPL: 1.4 G/DL
ALP SERPL-CCNC: 58 U/L (ref 39–117)
ALT SERPL W P-5'-P-CCNC: 13 U/L (ref 1–41)
ANION GAP SERPL CALCULATED.3IONS-SCNC: 8.3 MMOL/L (ref 5–15)
AST SERPL-CCNC: 19 U/L (ref 1–40)
BASOPHILS # BLD AUTO: 0.06 10*3/MM3 (ref 0–0.2)
BASOPHILS NFR BLD AUTO: 0.8 % (ref 0–1.5)
BILIRUB BLD-MCNC: NEGATIVE MG/DL
BILIRUB SERPL-MCNC: 0.6 MG/DL (ref 0–1.2)
BUN SERPL-MCNC: 23 MG/DL (ref 8–23)
BUN/CREAT SERPL: 22.1 (ref 7–25)
CALCIUM SPEC-SCNC: 9.2 MG/DL (ref 8.6–10.5)
CHLORIDE SERPL-SCNC: 102 MMOL/L (ref 98–107)
CHOLEST SERPL-MCNC: 129 MG/DL (ref 0–200)
CLARITY, POC: CLEAR
CO2 SERPL-SCNC: 26.7 MMOL/L (ref 22–29)
COLOR UR: YELLOW
CREAT SERPL-MCNC: 1.04 MG/DL (ref 0.76–1.27)
DEPRECATED RDW RBC AUTO: 37.7 FL (ref 37–54)
EOSINOPHIL # BLD AUTO: 0.17 10*3/MM3 (ref 0–0.4)
EOSINOPHIL NFR BLD AUTO: 2.3 % (ref 0.3–6.2)
ERYTHROCYTE [DISTWIDTH] IN BLOOD BY AUTOMATED COUNT: 12.5 % (ref 12.3–15.4)
EXPIRATION DATE: NORMAL
GFR SERPL CREATININE-BSD FRML MDRD: 70 ML/MIN/1.73
GLOBULIN UR ELPH-MCNC: 3 GM/DL
GLUCOSE SERPL-MCNC: 102 MG/DL (ref 65–99)
GLUCOSE UR STRIP-MCNC: NEGATIVE MG/DL
HCT VFR BLD AUTO: 44.6 % (ref 37.5–51)
HDLC SERPL-MCNC: 52 MG/DL (ref 40–60)
HGB BLD-MCNC: 14.7 G/DL (ref 13–17.7)
IMM GRANULOCYTES # BLD AUTO: 0.03 10*3/MM3 (ref 0–0.05)
IMM GRANULOCYTES NFR BLD AUTO: 0.4 % (ref 0–0.5)
KETONES UR QL: NEGATIVE
LDLC SERPL CALC-MCNC: 63 MG/DL (ref 0–100)
LDLC/HDLC SERPL: 1.23 {RATIO}
LEUKOCYTE EST, POC: NEGATIVE
LYMPHOCYTES # BLD AUTO: 1.97 10*3/MM3 (ref 0.7–3.1)
LYMPHOCYTES NFR BLD AUTO: 26.2 % (ref 19.6–45.3)
Lab: NORMAL
MCH RBC QN AUTO: 27.8 PG (ref 26.6–33)
MCHC RBC AUTO-ENTMCNC: 33 G/DL (ref 31.5–35.7)
MCV RBC AUTO: 84.5 FL (ref 79–97)
MONOCYTES # BLD AUTO: 0.81 10*3/MM3 (ref 0.1–0.9)
MONOCYTES NFR BLD AUTO: 10.8 % (ref 5–12)
NEUTROPHILS NFR BLD AUTO: 4.47 10*3/MM3 (ref 1.7–7)
NEUTROPHILS NFR BLD AUTO: 59.5 % (ref 42.7–76)
NITRITE UR-MCNC: NEGATIVE MG/ML
NRBC BLD AUTO-RTO: 0 /100 WBC (ref 0–0.2)
PH UR: 5 [PH] (ref 5–8)
PLATELET # BLD AUTO: 208 10*3/MM3 (ref 140–450)
PMV BLD AUTO: 11.3 FL (ref 6–12)
POTASSIUM SERPL-SCNC: 4.5 MMOL/L (ref 3.5–5.2)
PROT SERPL-MCNC: 7.3 G/DL (ref 6–8.5)
PROT UR STRIP-MCNC: NEGATIVE MG/DL
RBC # BLD AUTO: 5.28 10*6/MM3 (ref 4.14–5.8)
RBC # UR STRIP: NEGATIVE /UL
SODIUM SERPL-SCNC: 137 MMOL/L (ref 136–145)
SP GR UR: 1.02 (ref 1–1.03)
T4 FREE SERPL-MCNC: 1.02 NG/DL (ref 0.93–1.7)
TRIGL SERPL-MCNC: 65 MG/DL (ref 0–150)
TSH SERPL DL<=0.05 MIU/L-ACNC: 2.74 UIU/ML (ref 0.27–4.2)
UROBILINOGEN UR QL: NORMAL
VLDLC SERPL-MCNC: 14 MG/DL (ref 5–40)
WBC # BLD AUTO: 7.51 10*3/MM3 (ref 3.4–10.8)

## 2021-07-16 PROCEDURE — 81003 URINALYSIS AUTO W/O SCOPE: CPT | Performed by: INTERNAL MEDICINE

## 2021-07-16 PROCEDURE — 85025 COMPLETE CBC W/AUTO DIFF WBC: CPT | Performed by: INTERNAL MEDICINE

## 2021-07-16 PROCEDURE — 36415 COLL VENOUS BLD VENIPUNCTURE: CPT | Performed by: INTERNAL MEDICINE

## 2021-07-16 PROCEDURE — 80061 LIPID PANEL: CPT | Performed by: INTERNAL MEDICINE

## 2021-07-16 PROCEDURE — 84439 ASSAY OF FREE THYROXINE: CPT | Performed by: INTERNAL MEDICINE

## 2021-07-16 PROCEDURE — 80053 COMPREHEN METABOLIC PANEL: CPT | Performed by: INTERNAL MEDICINE

## 2021-07-16 PROCEDURE — 84443 ASSAY THYROID STIM HORMONE: CPT | Performed by: INTERNAL MEDICINE

## 2021-07-16 PROCEDURE — 99214 OFFICE O/P EST MOD 30 MIN: CPT | Performed by: INTERNAL MEDICINE

## 2021-07-16 RX ORDER — LOSARTAN POTASSIUM 50 MG/1
25 TABLET ORAL NIGHTLY
Qty: 90 TABLET | Refills: 1
Start: 2021-07-16 | End: 2022-01-31

## 2021-07-16 RX ORDER — ROSUVASTATIN CALCIUM 20 MG/1
10 TABLET, COATED ORAL DAILY
Start: 2021-07-16 | End: 2021-08-26

## 2021-07-16 NOTE — PROGRESS NOTES
John Ellis is a 75 y.o. male.     Chief Complaint   Patient presents with   • Hypertension     6 month follow up  fasting       History obtained from the patient.      History of Present Illness     The patient has seen Dr. Blank  for a right-sided Thyroid Nodule.  Ultrasound showed the nodule to be calcified, so biopsy was not recommended.  Last ultrasound was on 1/22/21 (results below)     IMPRESSION:  Overall homogeneous normal-sized thyroid apart from the  right thyroid lobe which contains a peripheral calcified cystic focus  measuring 1 cm without internal complex features or soft tissue  component of internal flow similar in appearance to prior comparison  from 01/15/2020 examination without interval change. Ti-RADS Level 2.  Mildly suspicious with follow-up recommended in 12 months utilizing  ultrasound.     The patient sees Dr. Jorgensen for Restless Leg Syndrome, stable on Gabapentin and Sinemet.     The patient sees Dr. Francisco, Urology, once per year, for BPH.  He states his last appointment was 1/6/21.  He states u/a was chantelle,  PSA was normal per patient report  He denies urinary symptoms.     Cardiac Follow-up: The patient is here for a follow-up visit.       His Hypertension has been unstable.    Medications:  Losartan, but only taking 1/2 tablet daily.  His Hyperlipidemia has been stable.    LDL goal is <130.  Last LDL 106.  Medication: Crestor.   The patient is adherent with his medications.   Medication Side Effects: None.     Interval Events:  He has decreased is Losartan to 50 mg daily. He takes 100 mg if his blood pressure goes up. Blood pressure at home since then has been 106-126 / 60-65. He has also decreased his Crestor to 10 mg daily due to myalgias which did resolve with the dose adjustment.     Symptoms:  Denies chest pain, shortness of breath, AQUINO, orthopnea, PND, palpitations, syncope, lower extremity edema, claudication, lightheadedness, and dizziness  .    Associated Symptoms: No significant weight change since last visit. . Denies fatigue, headache, myalgias, arthralgias, polyuria, polydipsia, visual impairment, memory loss, concentration problems, and focal neurological deficit.     Lifestyle: The patient follows a diverse and healthy diet.  Exercise: He walks twice daily.  He is active at work.  Tobacco Use:  Former Smoker.     Colon Polyps Follow-up: The patient is here for follow-up of Colon Polyps which has been stable.  Family History: Brother diagnosed with Stage IV Colon Cancer in his 40s.  Interval Events: Last Colonoscopy was done 3/28/17, sigmoid polyp.  Symptoms: Denies abdominal pain, diarrhea, constipation, hematochezia, melena, or changes in the stool.    Medication: None.       Current Outpatient Medications on File Prior to Visit   Medication Sig Dispense Refill   • carbidopa-levodopa (SINEMET)  MG per tablet 2 tablets Daily.     • gabapentin (NEURONTIN) 300 MG capsule 1 capsule 2 (Two) Times a Day.       No current facility-administered medications on file prior to visit.       Current outpatient and discharge medications have been reconciled for the patient.  Reviewed by: Salome Knott MD        The following portions of the patient's history were reviewed and updated as appropriate: allergies, current medications, past family history, past medical history, past social history, past surgical history and problem list.    Review of Systems   Constitutional: Negative for fatigue and unexpected weight change.   Eyes: Negative for visual disturbance.   Respiratory: Negative for cough, shortness of breath and wheezing.    Cardiovascular: Negative for chest pain, palpitations and leg swelling.        No AQUINO, orthopnea, or claudication.   Gastrointestinal: Negative for abdominal pain, blood in stool, constipation, diarrhea, nausea and vomiting.        Denies melena.   Endocrine: Negative for polydipsia and polyuria.   Musculoskeletal: Negative  for arthralgias and myalgias.   Neurological: Negative for dizziness, syncope, light-headedness and headaches.        No memory issues.   Psychiatric/Behavioral: Negative for decreased concentration.         Objective       Blood pressure 121/78, pulse 63, temperature 97.1 °F (36.2 °C), temperature source Temporal, resp. rate 20, weight 81.4 kg (179 lb 8 oz).  Body mass index is 28.97 kg/m².      Physical Exam  Vitals and nursing note reviewed.   Constitutional:       Appearance: He is well-developed.      Comments: Overweight.   Neck:      Thyroid: No thyroid mass or thyromegaly.      Vascular: No carotid bruit.   Cardiovascular:      Rate and Rhythm: Normal rate and regular rhythm.      Pulses: Normal pulses.      Heart sounds: Normal heart sounds. No murmur heard.   No friction rub. No gallop.    Pulmonary:      Effort: Pulmonary effort is normal.      Breath sounds: Normal breath sounds.   Abdominal:      General: Bowel sounds are normal. There is no distension or abdominal bruit.      Palpations: Abdomen is soft. There is no hepatomegaly, splenomegaly or mass.      Tenderness: There is no abdominal tenderness.   Musculoskeletal:      Cervical back: Normal range of motion and neck supple.      Right lower leg: No edema.      Left lower leg: No edema.   Neurological:      Mental Status: He is alert.   Psychiatric:         Mood and Affect: Mood normal.       Advance Care Planning   ACP discussion was held with the patient during this visit. Patient does not have an advance directive, information provided.  ACP information pamphlet given to the patient.  ACP information provided on the AVS.    Results for orders placed or performed in visit on 07/16/21   POC Urinalysis Dipstick, Automated    Specimen: Urine   Result Value Ref Range    Color Yellow Yellow, Straw, Dark Yellow, Jaycee    Clarity, UA Clear Clear    Specific Gravity  1.025 1.005 - 1.030    pH, Urine 5.0 5.0 - 8.0    Leukocytes Negative Negative     Nitrite, UA Negative Negative    Protein, POC Negative Negative mg/dL    Glucose, UA Negative Negative, 1000 mg/dL (3+) mg/dL    Ketones, UA Negative Negative    Urobilinogen, UA Normal Normal    Bilirubin Negative Negative    Blood, UA Negative Negative    Lot Number [id number]     Expiration Date 02/28/2022        Assessment / Plan:  Diagnoses and all orders for this visit:    1. Essential hypertension (Primary)  -     losartan (COZAAR) 50 MG tablet; Take 0.5 tablets by mouth Every Night.  Dispense: 90 tablet; Refill: 1- MED LIST UPDATE.  -     Lipid Panel  -     Comprehensive Metabolic Panel  -     POC Urinalysis Dipstick, Automated  -     CBC & Differential   Continue current medication(s) as noted in the history of present illness.    2. Other hyperlipidemia  -     rosuvastatin (CRESTOR) 20 MG tablet; Take 0.5 tablets by mouth Daily- MED LIST UPDATE.  -     Lipid Panel  -     Comprehensive Metabolic Panel  -     POC Urinalysis Dipstick, Automated  -     CBC & Differential   Continue current medication(s) as noted in the history of present illness.    3. Benign colon polyp   Colonoscopy up-to-date.    4. Benign prostatic hyperplasia, unspecified whether lower urinary tract symptoms present   Stable, no medication.    5. Solitary nodule of right lobe of thyroid  -     TSH  -     T4, Free      I recommended Shingrix (new Shingles vaccine) and Hepatitis A vaccination at the pharmacy. Patient declines.    The patient declined scheduling a Medicare Wellness Exam today.    The patient declined scheduling a CT Lung Cancer Screening today.  Discussed risks of undiagnosed Lung Cancer, including death, and benefit of early diagnosis.        Return in about 6 months (around 1/16/2022) for Recheck HTN, fasting.

## 2021-07-16 NOTE — PATIENT INSTRUCTIONS
I recommend Shingrix (new Shingles vaccine) and Hepatitis A vaccination at the pharmacy.      Advance Care Planning and Advance Directives     You make decisions on a daily basis - decisions about where you want to live, your career, your home, your life. Perhaps one of the most important decisions you face is your choice for future medical care. Take time to talk with your family and your healthcare team and start planning today.  Advance Care Planning is a process that can help you:  · Understand possible future healthcare decisions in light of your own experiences  · Reflect on those decision in light of your goals and values  · Discuss your decisions with those closest to you and the healthcare professionals that care for you  · Make a plan by creating a document that reflects your wishes    Surrogate Decision Maker  In the event of a medical emergency, which has left you unable to communicate or to make your own decisions, you would need someone to make decisions for you.  It is important to discuss your preferences for medical treatment with this person while you are in good health.     Qualities of a surrogate decision maker:  • Willing to take on this role and responsibility  • Knows what you want for future medical care  • Willing to follow your wishes even if they don't agree with them  • Able to make difficult medical decisions under stressful circumstances    Advance Directives  These are legal documents you can create that will guide your healthcare team and decision maker(s) when needed. These documents can be stored in the electronic medical record.    · Living Will - a legal document to guide your care if you have a terminal condition or a serious illness and are unable to communicate. States vary by statute in document names/types, but most forms may include one or more of the following:        -  Directions regarding life-prolonging treatments        -  Directions regarding artificially provided  nutrition/hydration        -  Choosing a healthcare decision maker        -  Direction regarding organ/tissue donation    · Durable Power of  for Healthcare - this document names an -in-fact to make medical decisions for you, but it may also allow this person to make personal and financial decisions for you. Please seek the advice of an  if you need this type of document.    **Advance Directives are not required and no one may discriminate against you if you do not sign one.    Medical Orders  Many states allow specific forms/orders signed by your physician to record your wishes for medical treatment in your current state of health. This form, signed in personal communication with your physician, addresses resuscitation and other medical interventions that you may or may not want.      For more information or to schedule a time with a [facility] Advance Care Planning Facilitator contact: [email or web address] or call [phone] and someone will contact you directly.

## 2021-08-26 DIAGNOSIS — E78.49 OTHER HYPERLIPIDEMIA: ICD-10-CM

## 2021-08-26 RX ORDER — ROSUVASTATIN CALCIUM 20 MG/1
TABLET, COATED ORAL
Qty: 90 TABLET | Refills: 1 | Status: SHIPPED | OUTPATIENT
Start: 2021-08-26 | End: 2022-07-13 | Stop reason: SINTOL

## 2022-01-27 ENCOUNTER — LAB (OUTPATIENT)
Dept: LAB | Facility: HOSPITAL | Age: 77
End: 2022-01-27

## 2022-01-27 ENCOUNTER — OFFICE VISIT (OUTPATIENT)
Dept: INTERNAL MEDICINE | Facility: CLINIC | Age: 77
End: 2022-01-27

## 2022-01-27 VITALS
SYSTOLIC BLOOD PRESSURE: 160 MMHG | TEMPERATURE: 98 F | RESPIRATION RATE: 18 BRPM | DIASTOLIC BLOOD PRESSURE: 84 MMHG | BODY MASS INDEX: 30.32 KG/M2 | HEART RATE: 80 BPM | WEIGHT: 182 LBS | HEIGHT: 65 IN

## 2022-01-27 DIAGNOSIS — I10 ESSENTIAL HYPERTENSION: Primary | ICD-10-CM

## 2022-01-27 DIAGNOSIS — N40.0 BENIGN PROSTATIC HYPERPLASIA, UNSPECIFIED WHETHER LOWER URINARY TRACT SYMPTOMS PRESENT: ICD-10-CM

## 2022-01-27 DIAGNOSIS — E04.1 SOLITARY NODULE OF RIGHT LOBE OF THYROID: ICD-10-CM

## 2022-01-27 DIAGNOSIS — K63.5 BENIGN COLON POLYP: ICD-10-CM

## 2022-01-27 DIAGNOSIS — G25.81 RESTLESS LEGS SYNDROME (RLS): ICD-10-CM

## 2022-01-27 DIAGNOSIS — E78.49 OTHER HYPERLIPIDEMIA: ICD-10-CM

## 2022-01-27 DIAGNOSIS — Z12.5 SCREENING FOR PROSTATE CANCER: ICD-10-CM

## 2022-01-27 LAB
ALBUMIN SERPL-MCNC: 4.2 G/DL (ref 3.5–5.2)
ALBUMIN/GLOB SERPL: 1.2 G/DL
ALP SERPL-CCNC: 69 U/L (ref 39–117)
ALT SERPL W P-5'-P-CCNC: 18 U/L (ref 1–41)
ANION GAP SERPL CALCULATED.3IONS-SCNC: 14.5 MMOL/L (ref 5–15)
AST SERPL-CCNC: 17 U/L (ref 1–40)
BASOPHILS # BLD AUTO: 0.05 10*3/MM3 (ref 0–0.2)
BASOPHILS NFR BLD AUTO: 0.7 % (ref 0–1.5)
BILIRUB SERPL-MCNC: 0.5 MG/DL (ref 0–1.2)
BUN SERPL-MCNC: 24 MG/DL (ref 8–23)
BUN/CREAT SERPL: 27.3 (ref 7–25)
CALCIUM SPEC-SCNC: 9.7 MG/DL (ref 8.6–10.5)
CHLORIDE SERPL-SCNC: 99 MMOL/L (ref 98–107)
CHOLEST SERPL-MCNC: 137 MG/DL (ref 0–200)
CO2 SERPL-SCNC: 23.5 MMOL/L (ref 22–29)
CREAT SERPL-MCNC: 0.88 MG/DL (ref 0.76–1.27)
DEPRECATED RDW RBC AUTO: 37.2 FL (ref 37–54)
EOSINOPHIL # BLD AUTO: 0.16 10*3/MM3 (ref 0–0.4)
EOSINOPHIL NFR BLD AUTO: 2.2 % (ref 0.3–6.2)
ERYTHROCYTE [DISTWIDTH] IN BLOOD BY AUTOMATED COUNT: 12.4 % (ref 12.3–15.4)
GFR SERPL CREATININE-BSD FRML MDRD: 85 ML/MIN/1.73
GLOBULIN UR ELPH-MCNC: 3.4 GM/DL
GLUCOSE SERPL-MCNC: 96 MG/DL (ref 65–99)
HCT VFR BLD AUTO: 44.7 % (ref 37.5–51)
HDLC SERPL-MCNC: 50 MG/DL (ref 40–60)
HGB BLD-MCNC: 15 G/DL (ref 13–17.7)
IMM GRANULOCYTES # BLD AUTO: 0.03 10*3/MM3 (ref 0–0.05)
IMM GRANULOCYTES NFR BLD AUTO: 0.4 % (ref 0–0.5)
LDLC SERPL CALC-MCNC: 69 MG/DL (ref 0–100)
LDLC/HDLC SERPL: 1.36 {RATIO}
LYMPHOCYTES # BLD AUTO: 1.86 10*3/MM3 (ref 0.7–3.1)
LYMPHOCYTES NFR BLD AUTO: 25.5 % (ref 19.6–45.3)
MCH RBC QN AUTO: 27.9 PG (ref 26.6–33)
MCHC RBC AUTO-ENTMCNC: 33.6 G/DL (ref 31.5–35.7)
MCV RBC AUTO: 83.2 FL (ref 79–97)
MONOCYTES # BLD AUTO: 0.61 10*3/MM3 (ref 0.1–0.9)
MONOCYTES NFR BLD AUTO: 8.4 % (ref 5–12)
NEUTROPHILS NFR BLD AUTO: 4.59 10*3/MM3 (ref 1.7–7)
NEUTROPHILS NFR BLD AUTO: 62.8 % (ref 42.7–76)
NRBC BLD AUTO-RTO: 0 /100 WBC (ref 0–0.2)
PLATELET # BLD AUTO: 198 10*3/MM3 (ref 140–450)
PMV BLD AUTO: 11.3 FL (ref 6–12)
POTASSIUM SERPL-SCNC: 4.3 MMOL/L (ref 3.5–5.2)
PROT SERPL-MCNC: 7.6 G/DL (ref 6–8.5)
PSA SERPL-MCNC: 0.85 NG/ML (ref 0–4)
RBC # BLD AUTO: 5.37 10*6/MM3 (ref 4.14–5.8)
SODIUM SERPL-SCNC: 137 MMOL/L (ref 136–145)
TRIGL SERPL-MCNC: 95 MG/DL (ref 0–150)
TSH SERPL DL<=0.05 MIU/L-ACNC: 3.53 UIU/ML (ref 0.27–4.2)
VLDLC SERPL-MCNC: 18 MG/DL (ref 5–40)
WBC NRBC COR # BLD: 7.3 10*3/MM3 (ref 3.4–10.8)

## 2022-01-27 PROCEDURE — 85025 COMPLETE CBC W/AUTO DIFF WBC: CPT | Performed by: INTERNAL MEDICINE

## 2022-01-27 PROCEDURE — 84443 ASSAY THYROID STIM HORMONE: CPT | Performed by: INTERNAL MEDICINE

## 2022-01-27 PROCEDURE — G0103 PSA SCREENING: HCPCS | Performed by: INTERNAL MEDICINE

## 2022-01-27 PROCEDURE — 80061 LIPID PANEL: CPT | Performed by: INTERNAL MEDICINE

## 2022-01-27 PROCEDURE — 80053 COMPREHEN METABOLIC PANEL: CPT | Performed by: INTERNAL MEDICINE

## 2022-01-27 PROCEDURE — 99214 OFFICE O/P EST MOD 30 MIN: CPT | Performed by: INTERNAL MEDICINE

## 2022-01-27 RX ORDER — IBUPROFEN 200 MG
1-2 TABLET ORAL EVERY 4 HOURS PRN
COMMUNITY
End: 2022-05-24 | Stop reason: ALTCHOICE

## 2022-01-27 NOTE — PATIENT INSTRUCTIONS
I recommend Shingrix (new Shingles vaccine) and Hepatitis A vaccination at the pharmacy.    I also strongly recommend the Influenza vaccine, as we discussed.      MyPlate from USDA    MyPlate is an outline of a general healthy diet based on the 2010 Dietary Guidelines for Americans, from the U[place] Department of Agriculture (USDA). It sets guidelines for how much food you should eat from each food group based on your age, sex, and level of physical activity.  What are tips for following MyPlate?  To follow MyPlate recommendations:  · Eat a wide variety of fruits and vegetables, grains, and protein foods.  · Serve smaller portions and eat less food throughout the day.  · Limit portion sizes to avoid overeating.  · Enjoy your food.  · Get at least 150 minutes of exercise every week. This is about 30 minutes each day, 5 or more days per week.  It can be difficult to have every meal look like MyPlate. Think about MyPlate as eating guidelines for an entire day, rather than each individual meal.  Fruits and vegetables  · Make half of your plate fruits and vegetables.  · Eat many different colors of fruits and vegetables each day.  · For a 2,000 calorie daily food plan, eat:  ? 2½ cups of vegetables every day.  ? 2 cups of fruit every day.  · 1 cup is equal to:  ? 1 cup raw or cooked vegetables.  ? 1 cup raw fruit.  ? 1 medium-sized orange, apple, or banana.  ? 1 cup 100% fruit or vegetable juice.  ? 2 cups raw leafy greens, such as lettuce, spinach, or kale.  ? ½ cup dried fruit.  Grains  · One fourth of your plate should be grains.  · Make at least half of the grains you eat each day whole grains.  · For a 2,000 calorie daily food plan, eat 6 oz of grains every day.  · 1 oz is equal to:  ? 1 slice bread.  ? 1 cup cereal.  ? ½ cup cooked rice, cereal, or pasta.  Protein  · One fourth of your plate should be protein.  · Eat a wide variety of protein foods, including meat, poultry, fish, eggs, beans, nuts, and tofu.  · For a  2,000 calorie daily food plan, eat 5½ oz of protein every day.  · 1 oz is equal to:  ? 1 oz meat, poultry, or fish.  ? ¼ cup cooked beans.  ? 1 egg.  ? ½ oz nuts or seeds.  ? 1 Tbsp peanut butter.  Dairy  · Drink fat-free or low-fat (1%) milk.  · Eat or drink dairy as a side to meals.  · For a 2,000 calorie daily food plan, eat or drink 3 cups of dairy every day.  · 1 cup is equal to:  ? 1 cup milk, yogurt, cottage cheese, or soy milk (soy beverage).  ? 2 oz processed cheese.  ? 1½ oz natural cheese.  Fats, oils, salt, and sugars  · Only small amounts of oils are recommended.  · Avoid foods that are high in calories and low in nutritional value (empty calories), like foods high in fat or added sugars.  · Choose foods that are low in salt (sodium). Choose foods that have less than 140 milligrams (mg) of sodium per serving.  · Drink water instead of sugary drinks. Drink enough water each day to keep your urine pale yellow.  Where to find support  · Work with your health care provider or a nutrition specialist (dietitian) to develop a customized eating plan that is right for you.  · Download an stephany (mobile application) to help you track your daily food intake.  Where to find more information  · Go to ChooseMyPlate.gov for more information.  Summary  · MyPlate is a general guideline for healthy eating from the USDA. It is based on the 2010 Dietary Guidelines for Americans.  · In general, fruits and vegetables should take up ½ of your plate, grains should take up ¼ of your plate, and protein should take up ¼ of your plate.  This information is not intended to replace advice given to you by your health care provider. Make sure you discuss any questions you have with your health care provider.  Document Revised: 05/21/2020 Document Reviewed: 03/19/2018  Elsevier Patient Education © 2021 [employer]      Calorie Counting for Weight Loss  Calories are units of energy. Your body needs a certain number of calories from food  to keep going throughout the day. When you eat or drink more calories than your body needs, your body stores the extra calories mostly as fat. When you eat or drink fewer calories than your body needs, your body burns fat to get the energy it needs.  Calorie counting means keeping track of how many calories you eat and drink each day. Calorie counting can be helpful if you need to lose weight. If you eat fewer calories than your body needs, you should lose weight. Ask your health care provider what a healthy weight is for you.  For calorie counting to work, you will need to eat the right number of calories each day to lose a healthy amount of weight per week. A dietitian can help you figure out how many calories you need in a day and will suggest ways to reach your calorie goal.  · A healthy amount of weight to lose each week is usually 1-2 lb (0.5-0.9 kg). This usually means that your daily calorie intake should be reduced by 500-750 calories.  · Eating 1,200-1,500 calories a day can help most women lose weight.  · Eating 1,500-1,800 calories a day can help most men lose weight.  What do I need to know about calorie counting?  Work with your health care provider or dietitian to determine how many calories you should get each day. To meet your daily calorie goal, you will need to:  · Find out how many calories are in each food that you would like to eat. Try to do this before you eat.  · Decide how much of the food you plan to eat.  · Keep a food log. Do this by writing down what you ate and how many calories it had.  To successfully lose weight, it is important to balance calorie counting with a healthy lifestyle that includes regular activity.  Where do I find calorie information?    The number of calories in a food can be found on a Nutrition Facts label. If a food does not have a Nutrition Facts label, try to look up the calories online or ask your dietitian for help.  Remember that calories are listed per  serving. If you choose to have more than one serving of a food, you will have to multiply the calories per serving by the number of servings you plan to eat. For example, the label on a package of bread might say that a serving size is 1 slice and that there are 90 calories in a serving. If you eat 1 slice, you will have eaten 90 calories. If you eat 2 slices, you will have eaten 180 calories.  How do I keep a food log?  After each time that you eat, record the following in your food log as soon as possible:  · What you ate. Be sure to include toppings, sauces, and other extras on the food.  · How much you ate. This can be measured in cups, ounces, or number of items.  · How many calories were in each food and drink.  · The total number of calories in the food you ate.  Keep your food log near you, such as in a pocket-sized notebook or on an stephany or website on your mobile phone. Some programs will calculate calories for you and show you how many calories you have left to meet your daily goal.  What are some portion-control tips?  · Know how many calories are in a serving. This will help you know how many servings you can have of a certain food.  · Use a measuring cup to measure serving sizes. You could also try weighing out portions on a kitchen scale. With time, you will be able to estimate serving sizes for some foods.  · Take time to put servings of different foods on your favorite plates or in your favorite bowls and cups so you know what a serving looks like.  · Try not to eat straight from a food's packaging, such as from a bag or box. Eating straight from the package makes it hard to see how much you are eating and can lead to overeating. Put the amount you would like to eat in a cup or on a plate to make sure you are eating the right portion.  · Use smaller plates, glasses, and bowls for smaller portions and to prevent overeating.  · Try not to multitask. For example, avoid watching TV or using your computer  while eating. If it is time to eat, sit down at a table and enjoy your food. This will help you recognize when you are full. It will also help you be more mindful of what and how much you are eating.  What are tips for following this plan?  Reading food labels  · Check the calorie count compared with the serving size. The serving size may be smaller than what you are used to eating.  · Check the source of the calories. Try to choose foods that are high in protein, fiber, and vitamins, and low in saturated fat, trans fat, and sodium.  Shopping  · Read nutrition labels while you shop. This will help you make healthy decisions about which foods to buy.  · Pay attention to nutrition labels for low-fat or fat-free foods. These foods sometimes have the same number of calories or more calories than the full-fat versions. They also often have added sugar, starch, or salt to make up for flavor that was removed with the fat.  · Make a grocery list of lower-calorie foods and stick to it.  Cooking  · Try to cook your favorite foods in a healthier way. For example, try baking instead of frying.  · Use low-fat dairy products.  Meal planning  · Use more fruits and vegetables. One-half of your plate should be fruits and vegetables.  · Include lean proteins, such as chicken, turkey, and fish.  Lifestyle  Each week, aim to do one of the following:  · 150 minutes of moderate exercise, such as walking.  · 75 minutes of vigorous exercise, such as running.  General information  · Know how many calories are in the foods you eat most often. This will help you calculate calorie counts faster.  · Find a way of tracking calories that works for you. Get creative. Try different apps or programs if writing down calories does not work for you.  What foods should I eat?    · Eat nutritious foods. It is better to have a nutritious, high-calorie food, such as an avocado, than a food with few nutrients, such as a bag of potato chips.  · Use your  calories on foods and drinks that will fill you up and will not leave you hungry soon after eating.  ? Examples of foods that fill you up are nuts and nut butters, vegetables, lean proteins, and high-fiber foods such as whole grains. High-fiber foods are foods with more than 5 g of fiber per serving.  · Pay attention to calories in drinks. Low-calorie drinks include water and unsweetened drinks.  The items listed above may not be a complete list of foods and beverages you can eat. Contact a dietitian for more information.  What foods should I limit?  Limit foods or drinks that are not good sources of vitamins, minerals, or protein or that are high in unhealthy fats. These include:  · Candy.  · Other sweets.  · Sodas, specialty coffee drinks, alcohol, and juice.  The items listed above may not be a complete list of foods and beverages you should avoid. Contact a dietitian for more information.  How do I count calories when eating out?  · Pay attention to portions. Often, portions are much larger when eating out. Try these tips to keep portions smaller:  ? Consider sharing a meal instead of getting your own.  ? If you get your own meal, eat only half of it. Before you start eating, ask for a container and put half of your meal into it.  ? When available, consider ordering smaller portions from the menu instead of full portions.  · Pay attention to your food and drink choices. Knowing the way food is cooked and what is included with the meal can help you eat fewer calories.  ? If calories are listed on the menu, choose the lower-calorie options.  ? Choose dishes that include vegetables, fruits, whole grains, low-fat dairy products, and lean proteins.  ? Choose items that are boiled, broiled, grilled, or steamed. Avoid items that are buttered, battered, fried, or served with cream sauce. Items labeled as crispy are usually fried, unless stated otherwise.  ? Choose water, low-fat milk, unsweetened iced tea, or other  drinks without added sugar. If you want an alcoholic beverage, choose a lower-calorie option, such as a glass of wine or light beer.  ? Ask for dressings, sauces, and syrups on the side. These are usually high in calories, so you should limit the amount you eat.  ? If you want a salad, choose a garden salad and ask for grilled meats. Avoid extra toppings such as jones, cheese, or fried items. Ask for the dressing on the side, or ask for olive oil and vinegar or lemon to use as dressing.  · Estimate how many servings of a food you are given. Knowing serving sizes will help you be aware of how much food you are eating at restaurants.  Where to find more information  · Centers for Disease Control and Prevention: www.cdc.gov  · [place] Department of Agriculture: myplate.gov  Summary  · Calorie counting means keeping track of how many calories you eat and drink each day. If you eat fewer calories than your body needs, you should lose weight.  · A healthy amount of weight to lose per week is usually 1-2 lb (0.5-0.9 kg). This usually means reducing your daily calorie intake by 500-750 calories.  · The number of calories in a food can be found on a Nutrition Facts label. If a food does not have a Nutrition Facts label, try to look up the calories online or ask your dietitian for help.  · Use smaller plates, glasses, and bowls for smaller portions and to prevent overeating.  · Use your calories on foods and drinks that will fill you up and not leave you hungry shortly after a meal.  This information is not intended to replace advice given to you by your health care provider. Make sure you discuss any questions you have with your health care provider.  Document Revised: 01/28/2021 Document Reviewed: 01/28/2021  [employer] Patient Education © 2021 [employer]      Exercising to Stay Healthy  To become healthy and stay healthy, it is recommended that you do moderate-intensity and vigorous-intensity exercise. You can tell that you  are exercising at a moderate intensity if your heart starts beating faster and you start breathing faster but can still hold a conversation. You can tell that you are exercising at a vigorous intensity if you are breathing much harder and faster and cannot hold a conversation while exercising.  Exercising regularly is important. It has many health benefits, such as:  · Improving overall fitness, flexibility, and endurance.  · Increasing bone density.  · Helping with weight control.  · Decreasing body fat.  · Increasing muscle strength.  · Reducing stress and tension.  · Improving overall health.  How often should I exercise?  Choose an activity that you enjoy, and set realistic goals. Your health care provider can help you make an activity plan that works for you.  Exercise regularly as told by your health care provider. This may include:  · Doing strength training two times a week, such as:  ? Lifting weights.  ? Using resistance bands.  ? Push-ups.  ? Sit-ups.  ? Yoga.  · Doing a certain intensity of exercise for a given amount of time. Choose from these options:  ? A total of 150 minutes of moderate-intensity exercise every week.  ? A total of 75 minutes of vigorous-intensity exercise every week.  ? A mix of moderate-intensity and vigorous-intensity exercise every week.  Children, pregnant women, people who have not exercised regularly, people who are overweight, and older adults may need to talk with a health care provider about what activities are safe to do. If you have a medical condition, be sure to talk with your health care provider before you start a new exercise program.  What are some exercise ideas?  Moderate-intensity exercise ideas include:  · Walking 1 mile (1.6 km) in about 15 minutes.  · Biking.  · Hiking.  · Golfing.  · Dancing.  · Water aerobics.  Vigorous-intensity exercise ideas include:  · Walking 4.5 miles (7.2 km) or more in about 1 hour.  · Jogging or running 5 miles (8 km) in about 1  hour.  · Biking 10 miles (16.1 km) or more in about 1 hour.  · Lap swimming.  · Roller-skating or in-line skating.  · Cross-country skiing.  · Vigorous competitive sports, such as football, basketball, and soccer.  · Jumping rope.  · Aerobic dancing.  What are some everyday activities that can help me to get exercise?  · Yard work, such as:  ? Pushing a .  ? Raking and bagging leaves.  · Washing your car.  · Pushing a stroller.  · Shoveling snow.  · Gardening.  · Washing windows or floors.  How can I be more active in my day-to-day activities?  · Use stairs instead of an elevator.  · Take a walk during your lunch break.  · If you drive, park your car farther away from your work or school.  · If you take public transportation, get off one stop early and walk the rest of the way.  · Stand up or walk around during all of your indoor phone calls.  · Get up, stretch, and walk around every 30 minutes throughout the day.  · Enjoy exercise with a friend. Support to continue exercising will help you keep a regular routine of activity.  What guidelines can I follow while exercising?  · Before you start a new exercise program, talk with your health care provider.  · Do not exercise so much that you hurt yourself, feel dizzy, or get very short of breath.  · Wear comfortable clothes and wear shoes with good support.  · Drink plenty of water while you exercise to prevent dehydration or heat stroke.  · Work out until your breathing and your heartbeat get faster.  Where to find more information  · [place] Department of Health and Human Services: www.hhs.gov  · Centers for Disease Control and Prevention (CDC): www.cdc.gov  Summary  · Exercising regularly is important. It will improve your overall fitness, flexibility, and endurance.  · Regular exercise also will improve your overall health. It can help you control your weight, reduce stress, and improve your bone density.  · Do not exercise so much that you hurt yourself,  feel dizzy, or get very short of breath.  · Before you start a new exercise program, talk with your health care provider.  This information is not intended to replace advice given to you by your health care provider. Make sure you discuss any questions you have with your health care provider.  Document Revised: 11/30/2018 Document Reviewed: 11/08/2018  [employer]vier Patient Education © 2021 [employer]

## 2022-01-27 NOTE — PROGRESS NOTES
John Ellis is a 76 y.o. male.     Chief Complaint   Patient presents with   • Hypertension     fasting    • Hyperlipidemia       History obtained from the patient.      History of Present Illness     The patient has seen Dr. Blank  for a right-sided Thyroid Nodule.  Ultrasound showed the nodule to be calcified, so biopsy was not recommended.  On 7/16/2021, T4 and TSH were normal.  Last ultrasound was on 1/22/21 (results below)     IMPRESSION:  Overall homogeneous normal-sized thyroid apart from the  right thyroid lobe which contains a peripheral calcified cystic focus  measuring 1 cm without internal complex features or soft tissue  component of internal flow similar in appearance to prior comparison  from 01/15/2020 examination without interval change. Ti-RADS Level 2.  Mildly suspicious with follow-up recommended in 12 months utilizing  ultrasound.     The patient sees Dr. Jorgensen for Restless Leg Syndrome, stable on Gabapentin and Sinemet.     The patient sees Dr. Francisco, Urology, once per year, for BPH.  He states his last appointment was 1/6/21.  He states he is actually seeing Dr. Francisco every 6 months and feels like he did see the him again since then (no note sent).  He denies urinary symptoms.     Cardiac Follow-up: The patient is here for a follow-up visit.       His Hypertension has been stable.    Medications:  Losartan, but only taking 1/2 tablet daily.  His Hyperlipidemia has been stable.    LDL goal is <130.  Last LDL 63, TG 65.  Medication: Crestor.   The patient is adherent with his medications.   Medication Side Effects: None.     Interval Events:  Blood pressure at home since then has been 106-130 / 60-65.  When asked about his elevated blood pressure reading today, he states he did not sleep well.  However he states his blood pressure at home today was 122/75.     Symptoms:  Denies chest pain, shortness of breath, AQUINO, orthopnea, PND, palpitations, syncope, lower extremity  edema, claudication, lightheadedness, and dizziness .    Associated Symptoms: Weight up 3 pounds in the past 6 months.  Reports myalgias and arthralgias (shoulders and hips) since his second Covid vaccine, worsened slightly after the booster, but overall slowly improving.  Denies fatigue, headache, polyuria, polydipsia, visual impairment, memory loss, concentration problems, and focal neurological deficit.     Lifestyle: The patient follows a diverse and healthy diet.  Exercise: He walks daily.    Tobacco Use:  Former Smoker.     Colon Polyps Follow-up: The patient is here for follow-up of Colon Polyps which has been stable.  Family History: Brother diagnosed with Stage IV Colon Cancer in his 40s.  Interval Events: Last Colonoscopy was done 3/28/17, sigmoid polyp.  Symptoms: Denies abdominal pain, diarrhea, constipation, hematochezia, melena, or changes in the stool.    Medication: None.       Current Outpatient Medications on File Prior to Visit   Medication Sig Dispense Refill   • carbidopa-levodopa (SINEMET)  MG per tablet 2 tablets Daily.     • gabapentin (NEURONTIN) 300 MG capsule 1 capsule 2 (Two) Times a Day.     • Ibuprofen 200 MG capsule Take  by mouth.     • losartan (COZAAR) 50 MG tablet Take 0.5 tablets by mouth Every Night. 90 tablet 1   • rosuvastatin (CRESTOR) 20 MG tablet Take 1 tablet by mouth once daily 90 tablet 1     No current facility-administered medications on file prior to visit.       Current outpatient and discharge medications have been reconciled for the patient.  Reviewed by: Salome Knott MD        The following portions of the patient's history were reviewed and updated as appropriate: allergies, current medications, past family history, past medical history, past social history, past surgical history and problem list.    Review of Systems   Constitutional: Negative for fatigue and unexpected weight change.   Eyes: Negative for visual disturbance.   Respiratory: Negative for  "cough, shortness of breath and wheezing.    Cardiovascular: Negative for chest pain, palpitations and leg swelling.        No AQUINO, orthopnea, or claudication.   Gastrointestinal: Negative for abdominal pain, blood in stool, constipation, diarrhea, nausea and vomiting.        Denies melena.   Endocrine: Negative for polydipsia and polyuria.   Genitourinary: Negative for difficulty urinating, dysuria, frequency, hematuria and urgency.   Musculoskeletal: Positive for arthralgias and myalgias.   Neurological: Negative for dizziness, syncope, light-headedness and headaches.        No memory issues.   Psychiatric/Behavioral: Negative for decreased concentration.         Objective       Blood pressure 160/84, pulse 80, temperature 98 °F (36.7 °C), temperature source Temporal, resp. rate 18, height 165.7 cm (65.25\"), weight 82.6 kg (182 lb).  Body mass index is 30.05 kg/m².      Physical Exam  Vitals and nursing note reviewed.   Constitutional:       Appearance: He is well-developed. He is obese.   Neck:      Thyroid: No thyroid mass or thyromegaly.      Vascular: No carotid bruit.   Cardiovascular:      Rate and Rhythm: Normal rate and regular rhythm.      Pulses: Normal pulses.      Heart sounds: Normal heart sounds. No murmur heard.  No friction rub. No gallop.    Pulmonary:      Effort: Pulmonary effort is normal.      Breath sounds: Normal breath sounds.   Abdominal:      General: Bowel sounds are normal. There is no distension or abdominal bruit.      Palpations: Abdomen is soft. There is no hepatomegaly, splenomegaly or mass.      Tenderness: There is no abdominal tenderness.   Musculoskeletal:      Cervical back: Normal range of motion and neck supple.      Right lower leg: No edema.      Left lower leg: No edema.   Neurological:      Mental Status: He is alert.   Psychiatric:         Mood and Affect: Mood normal.       Advance Care Planning   ACP discussion was held with the patient during this visit. Patient does " not have an advance directive, declines further assistance.  ACP information pamphlet declined by the patient.  ACP information on the AVS declined by the patient.    Assessment / Plan:  Diagnoses and all orders for this visit:    1. Essential hypertension (Primary)  -     Lipid Panel  -     Comprehensive Metabolic Panel  -     CBC & Differential   Continue current medication(s) as noted in the history of present illness.   The patient agrees to monitor his blood pressure over the next 1 month and send me the readings.    2. Other hyperlipidemia  -     Lipid Panel  -     Comprehensive Metabolic Panel  -     CBC & Differential   Continue current medication(s) as noted in the history of present illness.    3. Benign colon polyp   Colonoscopy up-to-date.    4. Restless legs syndrome (RLS)   Continue current medication(s) as noted in the history of present illness.    5. Benign prostatic hyperplasia, unspecified whether lower urinary tract symptoms present   Stable, no medication.    6. Solitary nodule of right lobe of thyroid  -     US Thyroid; Future  -     TSH    7. Screening for prostate cancer  -     PSA Screen      Patient's Body mass index is 30.05 kg/m². indicating that he is obese (BMI >30). Obesity-related health conditions include the following: hypertension and dyslipidemias. Obesity is unchanged. BMI is is above average; BMI management plan is completed. We discussed portion control and increasing exercise..    The patient declines scheduling a CT lung cancer screening, despite risk of lung cancer due to smoking history.    I recommended Shingrix (new Shingles vaccine) and Hepatitis A vaccination at the pharmacy.  The patient declines.    Recommended Influenza vaccine. The patient declined.  The patient was informed he/she could be hospitalized and die from Influenza infection.      Return in about 6 months (around 7/27/2022) for Recheck HTN, fasting.

## 2022-01-31 DIAGNOSIS — I10 ESSENTIAL HYPERTENSION: ICD-10-CM

## 2022-01-31 RX ORDER — LOSARTAN POTASSIUM 50 MG/1
TABLET ORAL
Qty: 90 TABLET | Refills: 1 | Status: SHIPPED | OUTPATIENT
Start: 2022-01-31 | End: 2022-11-06

## 2022-02-09 ENCOUNTER — HOSPITAL ENCOUNTER (OUTPATIENT)
Dept: ULTRASOUND IMAGING | Facility: HOSPITAL | Age: 77
Discharge: HOME | End: 2022-02-09
Admitting: INTERNAL MEDICINE

## 2022-02-09 DIAGNOSIS — E04.1 SOLITARY NODULE OF RIGHT LOBE OF THYROID: ICD-10-CM

## 2022-02-09 PROCEDURE — 76536 US EXAM OF HEAD AND NECK: CPT

## 2022-05-04 ENCOUNTER — TELEPHONE (OUTPATIENT)
Dept: INTERNAL MEDICINE | Facility: CLINIC | Age: 77
End: 2022-05-04

## 2022-05-04 NOTE — TELEPHONE ENCOUNTER
Patient states his bloodpressure has been high for a few times during the morning. He states this morning it was 175/93. He states no chest pain, no headaches.

## 2022-05-05 NOTE — TELEPHONE ENCOUNTER
Those blood pressures sound appropriate.  If he had just the one high blood pressure this morning that has resolved, I would just continue current medication monitor blood pressure for now.

## 2022-05-05 NOTE — TELEPHONE ENCOUNTER
Pt stated that he was keeping track of his BP until about a month ago, but since things were going well he stopped.     Last night 142/74    Today   7am 119/67  11am 98/63  2:45pm 133/68

## 2022-05-05 NOTE — TELEPHONE ENCOUNTER
RACQUEL please read   Left message to return call with blood pressure readings for Dr Knott to review.  Does he have a few days of readings?

## 2022-05-20 ENCOUNTER — HOSPITAL ENCOUNTER (OUTPATIENT)
Facility: HOSPITAL | Age: 77
Discharge: HOME | End: 2022-05-21
Attending: STUDENT IN AN ORGANIZED HEALTH CARE EDUCATION/TRAINING PROGRAM | Admitting: INTERNAL MEDICINE

## 2022-05-20 ENCOUNTER — APPOINTMENT (OUTPATIENT)
Dept: CARDIOLOGY | Facility: HOSPITAL | Age: 77
End: 2022-05-20

## 2022-05-20 ENCOUNTER — APPOINTMENT (OUTPATIENT)
Dept: GENERAL RADIOLOGY | Facility: HOSPITAL | Age: 77
End: 2022-05-20

## 2022-05-20 DIAGNOSIS — I10 ESSENTIAL HYPERTENSION: ICD-10-CM

## 2022-05-20 DIAGNOSIS — R07.9 CHEST PAIN, UNSPECIFIED TYPE: ICD-10-CM

## 2022-05-20 DIAGNOSIS — I20.0 UNSTABLE ANGINA: ICD-10-CM

## 2022-05-20 DIAGNOSIS — R00.0 TACHYCARDIA: ICD-10-CM

## 2022-05-20 DIAGNOSIS — I25.110 CORONARY ARTERY DISEASE INVOLVING NATIVE CORONARY ARTERY OF NATIVE HEART WITH UNSTABLE ANGINA PECTORIS: Primary | ICD-10-CM

## 2022-05-20 PROBLEM — Z87.891 FORMER SMOKER: Status: ACTIVE | Noted: 2022-05-20

## 2022-05-20 PROBLEM — I20.9 ANGINA PECTORIS (HCC): Status: ACTIVE | Noted: 2022-05-20

## 2022-05-20 LAB
ALBUMIN SERPL-MCNC: 4.1 G/DL (ref 3.5–5.2)
ALBUMIN/GLOB SERPL: 1.1 G/DL
ALP SERPL-CCNC: 68 U/L (ref 39–117)
ALT SERPL W P-5'-P-CCNC: 6 U/L (ref 1–41)
ANION GAP SERPL CALCULATED.3IONS-SCNC: 11 MMOL/L (ref 5–15)
AST SERPL-CCNC: 19 U/L (ref 1–40)
BASOPHILS # BLD AUTO: 0.05 10*3/MM3 (ref 0–0.2)
BASOPHILS NFR BLD AUTO: 0.6 % (ref 0–1.5)
BH CV ECHO MEAS - AO MAX PG: 6.2 MMHG
BH CV ECHO MEAS - AO MEAN PG: 2.9 MMHG
BH CV ECHO MEAS - AO ROOT DIAM: 3.6 CM
BH CV ECHO MEAS - AO V2 MAX: 124.2 CM/SEC
BH CV ECHO MEAS - AO V2 VTI: 27 CM
BH CV ECHO MEAS - AVA(I,D): 2.6 CM2
BH CV ECHO MEAS - EDV(CUBED): 118.3 ML
BH CV ECHO MEAS - EDV(MOD-SP2): 79 ML
BH CV ECHO MEAS - EDV(MOD-SP4): 108 ML
BH CV ECHO MEAS - EF(MOD-BP): 65 %
BH CV ECHO MEAS - EF(MOD-SP2): 59.5 %
BH CV ECHO MEAS - EF(MOD-SP4): 70.4 %
BH CV ECHO MEAS - ESV(CUBED): 31.2 ML
BH CV ECHO MEAS - ESV(MOD-SP2): 32 ML
BH CV ECHO MEAS - ESV(MOD-SP4): 32 ML
BH CV ECHO MEAS - FS: 35.8 %
BH CV ECHO MEAS - IVS/LVPW: 1.07 CM
BH CV ECHO MEAS - IVSD: 1.06 CM
BH CV ECHO MEAS - LA DIMENSION: 3.8 CM
BH CV ECHO MEAS - LV MASS(C)D: 182.4 GRAMS
BH CV ECHO MEAS - LV MAX PG: 2.46 MMHG
BH CV ECHO MEAS - LV MEAN PG: 1.34 MMHG
BH CV ECHO MEAS - LV V1 MAX: 78.5 CM/SEC
BH CV ECHO MEAS - LV V1 VTI: 16.4 CM
BH CV ECHO MEAS - LVIDD: 4.9 CM
BH CV ECHO MEAS - LVIDS: 3.1 CM
BH CV ECHO MEAS - LVOT AREA: 4.3 CM2
BH CV ECHO MEAS - LVOT DIAM: 2.33 CM
BH CV ECHO MEAS - LVPWD: 0.99 CM
BH CV ECHO MEAS - MR MAX PG: 6.3 MMHG
BH CV ECHO MEAS - MR MAX VEL: 125.1 CM/SEC
BH CV ECHO MEAS - MV A MAX VEL: 104.6 CM/SEC
BH CV ECHO MEAS - MV DEC SLOPE: 370.6 CM/SEC2
BH CV ECHO MEAS - MV DEC TIME: 0.26 MSEC
BH CV ECHO MEAS - MV E MAX VEL: 62.7 CM/SEC
BH CV ECHO MEAS - MV E/A: 0.6
BH CV ECHO MEAS - MV P1/2T: 65.2 MSEC
BH CV ECHO MEAS - MVA(P1/2T): 3.4 CM2
BH CV ECHO MEAS - PA ACC SLOPE: 368.3 CM/SEC2
BH CV ECHO MEAS - PA ACC TIME: 0.18 SEC
BH CV ECHO MEAS - PA PR(ACCEL): -1.81 MMHG
BH CV ECHO MEAS - PA V2 MAX: 86.1 CM/SEC
BH CV ECHO MEAS - RAP SYSTOLE: 3 MMHG
BH CV ECHO MEAS - RVSP: 11 MMHG
BH CV ECHO MEAS - SV(LVOT): 69.9 ML
BH CV ECHO MEAS - SV(MOD-SP2): 47 ML
BH CV ECHO MEAS - SV(MOD-SP4): 76 ML
BH CV ECHO MEAS - TAPSE (>1.6): 1.9 CM
BH CV ECHO MEAS - TR MAX PG: 7.8 MMHG
BH CV ECHO MEAS - TR MAX VEL: 139.9 CM/SEC
BH CV XLRA - RV BASE: 4.2 CM
BH CV XLRA - RV LENGTH: 6.7 CM
BH CV XLRA - RV MID: 2.5 CM
BILIRUB SERPL-MCNC: 0.3 MG/DL (ref 0–1.2)
BUN SERPL-MCNC: 15 MG/DL (ref 8–23)
BUN/CREAT SERPL: 16.7 (ref 7–25)
CALCIUM SPEC-SCNC: 9.6 MG/DL (ref 8.6–10.5)
CATH EF ESTIMATED: 55 %
CHLORIDE SERPL-SCNC: 102 MMOL/L (ref 98–107)
CHOLEST SERPL-MCNC: 131 MG/DL (ref 0–200)
CO2 SERPL-SCNC: 25 MMOL/L (ref 22–29)
CREAT SERPL-MCNC: 0.9 MG/DL (ref 0.76–1.27)
DEPRECATED RDW RBC AUTO: 39.8 FL (ref 37–54)
EGFRCR SERPLBLD CKD-EPI 2021: 89.6 ML/MIN/1.73
EOSINOPHIL # BLD AUTO: 0.27 10*3/MM3 (ref 0–0.4)
EOSINOPHIL NFR BLD AUTO: 3.4 % (ref 0.3–6.2)
ERYTHROCYTE [DISTWIDTH] IN BLOOD BY AUTOMATED COUNT: 13.1 % (ref 12.3–15.4)
GLOBULIN UR ELPH-MCNC: 3.8 GM/DL
GLUCOSE SERPL-MCNC: 122 MG/DL (ref 65–99)
HBA1C MFR BLD: 5.5 % (ref 4.8–5.6)
HCT VFR BLD AUTO: 44.8 % (ref 37.5–51)
HDLC SERPL-MCNC: 48 MG/DL (ref 40–60)
HGB BLD-MCNC: 14.6 G/DL (ref 13–17.7)
HOLD SPECIMEN: NORMAL
IMM GRANULOCYTES # BLD AUTO: 0.03 10*3/MM3 (ref 0–0.05)
IMM GRANULOCYTES NFR BLD AUTO: 0.4 % (ref 0–0.5)
LDLC SERPL CALC-MCNC: 67 MG/DL (ref 0–100)
LDLC/HDLC SERPL: 1.38 {RATIO}
LEFT ATRIUM VOLUME INDEX: 25.4 ML/M2
LIPASE SERPL-CCNC: 54 U/L (ref 13–60)
LV EF 2D ECHO EST: 60 %
LYMPHOCYTES # BLD AUTO: 2.77 10*3/MM3 (ref 0.7–3.1)
LYMPHOCYTES NFR BLD AUTO: 34.7 % (ref 19.6–45.3)
MAXIMAL PREDICTED HEART RATE: 146 BPM
MCH RBC QN AUTO: 27.2 PG (ref 26.6–33)
MCHC RBC AUTO-ENTMCNC: 32.6 G/DL (ref 31.5–35.7)
MCV RBC AUTO: 83.4 FL (ref 79–97)
MONOCYTES # BLD AUTO: 0.82 10*3/MM3 (ref 0.1–0.9)
MONOCYTES NFR BLD AUTO: 10.3 % (ref 5–12)
NEUTROPHILS NFR BLD AUTO: 4.04 10*3/MM3 (ref 1.7–7)
NEUTROPHILS NFR BLD AUTO: 50.6 % (ref 42.7–76)
NRBC BLD AUTO-RTO: 0 /100 WBC (ref 0–0.2)
NT-PROBNP SERPL-MCNC: 151.2 PG/ML (ref 0–900)
PLATELET # BLD AUTO: 225 10*3/MM3 (ref 140–450)
PMV BLD AUTO: 10.2 FL (ref 6–12)
POTASSIUM SERPL-SCNC: 3.8 MMOL/L (ref 3.5–5.2)
PROT SERPL-MCNC: 7.9 G/DL (ref 6–8.5)
RBC # BLD AUTO: 5.37 10*6/MM3 (ref 4.14–5.8)
SARS-COV-2 RDRP RESP QL NAA+PROBE: NORMAL
SODIUM SERPL-SCNC: 138 MMOL/L (ref 136–145)
STRESS TARGET HR: 124 BPM
TRIGL SERPL-MCNC: 85 MG/DL (ref 0–150)
TROPONIN T SERPL-MCNC: 0.03 NG/ML (ref 0–0.03)
TROPONIN T SERPL-MCNC: 0.06 NG/ML (ref 0–0.03)
TROPONIN T SERPL-MCNC: <0.01 NG/ML (ref 0–0.03)
VLDLC SERPL-MCNC: 16 MG/DL (ref 5–40)
WBC NRBC COR # BLD: 7.98 10*3/MM3 (ref 3.4–10.8)
WHOLE BLOOD HOLD COAG: NORMAL
WHOLE BLOOD HOLD SPECIMEN: NORMAL

## 2022-05-20 PROCEDURE — 93571 IV DOP VEL&/PRESS C FLO 1ST: CPT | Performed by: INTERNAL MEDICINE

## 2022-05-20 PROCEDURE — 83690 ASSAY OF LIPASE: CPT

## 2022-05-20 PROCEDURE — 96365 THER/PROPH/DIAG IV INF INIT: CPT

## 2022-05-20 PROCEDURE — 93005 ELECTROCARDIOGRAM TRACING: CPT

## 2022-05-20 PROCEDURE — G0378 HOSPITAL OBSERVATION PER HR: HCPCS

## 2022-05-20 PROCEDURE — 85025 COMPLETE CBC W/AUTO DIFF WBC: CPT

## 2022-05-20 PROCEDURE — 0 IOPAMIDOL PER 1 ML: Performed by: INTERNAL MEDICINE

## 2022-05-20 PROCEDURE — 25010000002 ENOXAPARIN PER 10 MG: Performed by: INTERNAL MEDICINE

## 2022-05-20 PROCEDURE — A9270 NON-COVERED ITEM OR SERVICE: HCPCS | Performed by: INTERNAL MEDICINE

## 2022-05-20 PROCEDURE — 96372 THER/PROPH/DIAG INJ SC/IM: CPT

## 2022-05-20 PROCEDURE — 80061 LIPID PANEL: CPT | Performed by: INTERNAL MEDICINE

## 2022-05-20 PROCEDURE — 93005 ELECTROCARDIOGRAM TRACING: CPT | Performed by: STUDENT IN AN ORGANIZED HEALTH CARE EDUCATION/TRAINING PROGRAM

## 2022-05-20 PROCEDURE — C9600 PERC DRUG-EL COR STENT SING: HCPCS | Performed by: INTERNAL MEDICINE

## 2022-05-20 PROCEDURE — 99204 OFFICE O/P NEW MOD 45 MIN: CPT | Performed by: INTERNAL MEDICINE

## 2022-05-20 PROCEDURE — 84484 ASSAY OF TROPONIN QUANT: CPT

## 2022-05-20 PROCEDURE — C1769 GUIDE WIRE: HCPCS | Performed by: INTERNAL MEDICINE

## 2022-05-20 PROCEDURE — 63710000001 CARBIDOPA-LEVODOPA 25-100 MG TABLET: Performed by: INTERNAL MEDICINE

## 2022-05-20 PROCEDURE — C1874 STENT, COATED/COV W/DEL SYS: HCPCS | Performed by: INTERNAL MEDICINE

## 2022-05-20 PROCEDURE — 99220 PR INITIAL OBSERVATION CARE/DAY 70 MINUTES: CPT | Performed by: INTERNAL MEDICINE

## 2022-05-20 PROCEDURE — 25010000002 FENTANYL CITRATE (PF) 50 MCG/ML SOLUTION: Performed by: INTERNAL MEDICINE

## 2022-05-20 PROCEDURE — 25010000002 BIVALIRUDIN TRIFLUOROACETATE 250 MG RECONSTITUTED SOLUTION 1 EACH VIAL: Performed by: INTERNAL MEDICINE

## 2022-05-20 PROCEDURE — 80053 COMPREHEN METABOLIC PANEL: CPT

## 2022-05-20 PROCEDURE — 25010000002 BIVALIRUDIN TRIFLUOROACETATE 250 MG RECONSTITUTED SOLUTION: Performed by: INTERNAL MEDICINE

## 2022-05-20 PROCEDURE — 84484 ASSAY OF TROPONIN QUANT: CPT | Performed by: STUDENT IN AN ORGANIZED HEALTH CARE EDUCATION/TRAINING PROGRAM

## 2022-05-20 PROCEDURE — 92928 PRQ TCAT PLMT NTRAC ST 1 LES: CPT | Performed by: INTERNAL MEDICINE

## 2022-05-20 PROCEDURE — C1725 CATH, TRANSLUMIN NON-LASER: HCPCS | Performed by: INTERNAL MEDICINE

## 2022-05-20 PROCEDURE — 99285 EMERGENCY DEPT VISIT HI MDM: CPT

## 2022-05-20 PROCEDURE — 63710000001 CLOPIDOGREL 75 MG TABLET: Performed by: INTERNAL MEDICINE

## 2022-05-20 PROCEDURE — 71045 X-RAY EXAM CHEST 1 VIEW: CPT

## 2022-05-20 PROCEDURE — 83036 HEMOGLOBIN GLYCOSYLATED A1C: CPT | Performed by: NURSE PRACTITIONER

## 2022-05-20 PROCEDURE — 93458 L HRT ARTERY/VENTRICLE ANGIO: CPT | Performed by: INTERNAL MEDICINE

## 2022-05-20 PROCEDURE — C1887 CATHETER, GUIDING: HCPCS | Performed by: INTERNAL MEDICINE

## 2022-05-20 PROCEDURE — 93306 TTE W/DOPPLER COMPLETE: CPT

## 2022-05-20 PROCEDURE — 93306 TTE W/DOPPLER COMPLETE: CPT | Performed by: INTERNAL MEDICINE

## 2022-05-20 PROCEDURE — 63710000001 GABAPENTIN 300 MG CAPSULE: Performed by: INTERNAL MEDICINE

## 2022-05-20 PROCEDURE — 25010000002 MIDAZOLAM PER 1 MG: Performed by: INTERNAL MEDICINE

## 2022-05-20 PROCEDURE — 96366 THER/PROPH/DIAG IV INF ADDON: CPT

## 2022-05-20 PROCEDURE — C9803 HOPD COVID-19 SPEC COLLECT: HCPCS

## 2022-05-20 PROCEDURE — 83880 ASSAY OF NATRIURETIC PEPTIDE: CPT

## 2022-05-20 PROCEDURE — 63710000001 ACETAMINOPHEN 325 MG TABLET: Performed by: INTERNAL MEDICINE

## 2022-05-20 PROCEDURE — C1894 INTRO/SHEATH, NON-LASER: HCPCS | Performed by: INTERNAL MEDICINE

## 2022-05-20 PROCEDURE — 87635 SARS-COV-2 COVID-19 AMP PRB: CPT | Performed by: STUDENT IN AN ORGANIZED HEALTH CARE EDUCATION/TRAINING PROGRAM

## 2022-05-20 DEVICE — XIENCE SKYPOINT™ EVEROLIMUS ELUTING CORONARY STENT SYSTEM 2.50 MM X 12 MM / RAPID-EXCHANGE
Type: IMPLANTABLE DEVICE | Status: FUNCTIONAL
Brand: XIENCE SKYPOINT™

## 2022-05-20 RX ORDER — ASPIRIN 81 MG/1
324 TABLET, CHEWABLE ORAL ONCE
Status: COMPLETED | OUTPATIENT
Start: 2022-05-20 | End: 2022-05-20

## 2022-05-20 RX ORDER — SODIUM CHLORIDE 0.9 % (FLUSH) 0.9 %
10 SYRINGE (ML) INJECTION EVERY 12 HOURS SCHEDULED
Status: DISCONTINUED | OUTPATIENT
Start: 2022-05-20 | End: 2022-05-21 | Stop reason: HOSPADM

## 2022-05-20 RX ORDER — GABAPENTIN 300 MG/1
300 CAPSULE ORAL NIGHTLY
Status: DISCONTINUED | OUTPATIENT
Start: 2022-05-20 | End: 2022-05-21 | Stop reason: HOSPADM

## 2022-05-20 RX ORDER — MORPHINE SULFATE 2 MG/ML
1 INJECTION, SOLUTION INTRAMUSCULAR; INTRAVENOUS EVERY 4 HOURS PRN
Status: DISCONTINUED | OUTPATIENT
Start: 2022-05-20 | End: 2022-05-21 | Stop reason: HOSPADM

## 2022-05-20 RX ORDER — DIPHENHYDRAMINE HYDROCHLORIDE 50 MG/ML
25 INJECTION INTRAMUSCULAR; INTRAVENOUS EVERY 6 HOURS PRN
Status: DISCONTINUED | OUTPATIENT
Start: 2022-05-20 | End: 2022-05-21 | Stop reason: HOSPADM

## 2022-05-20 RX ORDER — SODIUM CHLORIDE 9 MG/ML
3 INJECTION, SOLUTION INTRAVENOUS CONTINUOUS
Status: ACTIVE | OUTPATIENT
Start: 2022-05-20 | End: 2022-05-20

## 2022-05-20 RX ORDER — MIDAZOLAM HYDROCHLORIDE 1 MG/ML
INJECTION INTRAMUSCULAR; INTRAVENOUS AS NEEDED
Status: DISCONTINUED | OUTPATIENT
Start: 2022-05-20 | End: 2022-05-20 | Stop reason: HOSPADM

## 2022-05-20 RX ORDER — ENOXAPARIN SODIUM 100 MG/ML
80 INJECTION SUBCUTANEOUS ONCE
Status: COMPLETED | OUTPATIENT
Start: 2022-05-20 | End: 2022-05-20

## 2022-05-20 RX ORDER — ASPIRIN 81 MG/1
81 TABLET ORAL DAILY
Status: DISCONTINUED | OUTPATIENT
Start: 2022-05-21 | End: 2022-05-21 | Stop reason: HOSPADM

## 2022-05-20 RX ORDER — NITROGLYCERIN 20 MG/100ML
5-200 INJECTION INTRAVENOUS
Status: DISCONTINUED | OUTPATIENT
Start: 2022-05-20 | End: 2022-05-20

## 2022-05-20 RX ORDER — NALOXONE HCL 0.4 MG/ML
0.4 VIAL (ML) INJECTION
Status: DISCONTINUED | OUTPATIENT
Start: 2022-05-20 | End: 2022-05-21 | Stop reason: HOSPADM

## 2022-05-20 RX ORDER — SODIUM CHLORIDE 0.9 % (FLUSH) 0.9 %
10 SYRINGE (ML) INJECTION AS NEEDED
Status: DISCONTINUED | OUTPATIENT
Start: 2022-05-20 | End: 2022-05-21 | Stop reason: HOSPADM

## 2022-05-20 RX ORDER — BIVALIRUDIN 250 MG/5ML
INJECTION, POWDER, LYOPHILIZED, FOR SOLUTION INTRAVENOUS AS NEEDED
Status: DISCONTINUED | OUTPATIENT
Start: 2022-05-20 | End: 2022-05-20 | Stop reason: HOSPADM

## 2022-05-20 RX ORDER — CLOPIDOGREL BISULFATE 75 MG/1
TABLET ORAL AS NEEDED
Status: DISCONTINUED | OUTPATIENT
Start: 2022-05-20 | End: 2022-05-20 | Stop reason: HOSPADM

## 2022-05-20 RX ORDER — ROSUVASTATIN CALCIUM 20 MG/1
20 TABLET, COATED ORAL DAILY
Status: DISCONTINUED | OUTPATIENT
Start: 2022-05-20 | End: 2022-05-21 | Stop reason: HOSPADM

## 2022-05-20 RX ORDER — ONDANSETRON 2 MG/ML
4 INJECTION INTRAMUSCULAR; INTRAVENOUS EVERY 6 HOURS PRN
Status: DISCONTINUED | OUTPATIENT
Start: 2022-05-20 | End: 2022-05-21 | Stop reason: HOSPADM

## 2022-05-20 RX ORDER — ENOXAPARIN SODIUM 100 MG/ML
80 INJECTION SUBCUTANEOUS EVERY 12 HOURS
Status: DISCONTINUED | OUTPATIENT
Start: 2022-05-20 | End: 2022-05-20

## 2022-05-20 RX ORDER — LOSARTAN POTASSIUM 50 MG/1
50 TABLET ORAL ONCE
Status: COMPLETED | OUTPATIENT
Start: 2022-05-20 | End: 2022-05-20

## 2022-05-20 RX ORDER — ALPRAZOLAM 0.25 MG/1
0.25 TABLET ORAL 3 TIMES DAILY PRN
Status: DISCONTINUED | OUTPATIENT
Start: 2022-05-20 | End: 2022-05-21 | Stop reason: HOSPADM

## 2022-05-20 RX ORDER — ACETAMINOPHEN 325 MG/1
650 TABLET ORAL EVERY 4 HOURS PRN
Status: DISCONTINUED | OUTPATIENT
Start: 2022-05-20 | End: 2022-05-21 | Stop reason: HOSPADM

## 2022-05-20 RX ORDER — LIDOCAINE HYDROCHLORIDE 10 MG/ML
INJECTION, SOLUTION EPIDURAL; INFILTRATION; INTRACAUDAL; PERINEURAL AS NEEDED
Status: DISCONTINUED | OUTPATIENT
Start: 2022-05-20 | End: 2022-05-20 | Stop reason: HOSPADM

## 2022-05-20 RX ORDER — CLOPIDOGREL BISULFATE 75 MG/1
75 TABLET ORAL DAILY
Status: DISCONTINUED | OUTPATIENT
Start: 2022-05-21 | End: 2022-05-21 | Stop reason: HOSPADM

## 2022-05-20 RX ORDER — LOSARTAN POTASSIUM 50 MG/1
50 TABLET ORAL NIGHTLY
Status: DISCONTINUED | OUTPATIENT
Start: 2022-05-21 | End: 2022-05-21 | Stop reason: HOSPADM

## 2022-05-20 RX ORDER — FENTANYL CITRATE 50 UG/ML
INJECTION, SOLUTION INTRAMUSCULAR; INTRAVENOUS AS NEEDED
Status: DISCONTINUED | OUTPATIENT
Start: 2022-05-20 | End: 2022-05-20 | Stop reason: HOSPADM

## 2022-05-20 RX ADMIN — ENOXAPARIN SODIUM 80 MG: 80 INJECTION SUBCUTANEOUS at 09:53

## 2022-05-20 RX ADMIN — ROSUVASTATIN 20 MG: 20 TABLET, FILM COATED ORAL at 10:50

## 2022-05-20 RX ADMIN — CARBIDOPA AND LEVODOPA 1 TABLET: 25; 100 TABLET ORAL at 21:35

## 2022-05-20 RX ADMIN — SODIUM CHLORIDE 3 ML/KG/HR: 9 INJECTION, SOLUTION INTRAVENOUS at 16:55

## 2022-05-20 RX ADMIN — GABAPENTIN 300 MG: 300 CAPSULE ORAL at 21:35

## 2022-05-20 RX ADMIN — METOPROLOL TARTRATE 25 MG: 25 TABLET, FILM COATED ORAL at 10:50

## 2022-05-20 RX ADMIN — ASPIRIN 81 MG 324 MG: 81 TABLET ORAL at 04:40

## 2022-05-20 RX ADMIN — NITROGLYCERIN 5 MCG/MIN: 20 INJECTION INTRAVENOUS at 09:53

## 2022-05-20 RX ADMIN — Medication 10 ML: at 10:00

## 2022-05-20 RX ADMIN — Medication 10 ML: at 21:36

## 2022-05-20 RX ADMIN — ACETAMINOPHEN 650 MG: 325 TABLET ORAL at 17:06

## 2022-05-20 RX ADMIN — LOSARTAN POTASSIUM 50 MG: 50 TABLET, FILM COATED ORAL at 10:50

## 2022-05-20 NOTE — CONSULTS
Inpatient Cardiology Consult  Consult performed by: Massiel Cross APRN  Consult ordered by: Roxana Lee MD          Cardiology Consult   [facility] Cardiology      Reason for consultation:  · Chest pain    Requesting provider: Roxana Lee MD  Consulting provider: JOSE ANTONIO Rocha/Velasquez Ugarte MD     IDENTIFICATION: 76-year-old male who resides in [place], [place]      Active Hospital Problems    Diagnosis  POA   • **Unstable angina (HCC) [I20.0]  Yes     Priority: High     · Presentation [facility] 5/20/2022 with chest pain awoken from sleep.  Troponin x2 negative, third troponin elevated 0.061.  EKG without acute ischemia     • Hyperlipidemia [E78.5]  Yes     Priority: Medium   • Essential hypertension [I10]  Yes     Priority: Medium     · Echo (2/6/2019): LVEF 60-65%.  No significant valvular abnormality.     • Former smoker [Z87.891]  Not Applicable     Priority: Low              Patient is a 76-year-old gentleman with no prior coronary artery disease but history of hypertension and hyperlipidemia who we are being asked to consult for chest pain.  Patient presented to [facility] early this morning after being awoken with sudden onset of chest heaviness that felt like something sitting on his chest.  According to the patient his wife checked his blood pressure and it was elevated at 170/110 and heart rate in the 170s.  He is a retired  and reports he is usually fairly active. On Wednesday he was performing a lot of physical activities as he was helping a friend work on his car and he performed a lot of heavy lifting, pushing and pulling.  He did not have any symptoms during this time but the next day on Thursday he was very fatigued.  He denies any previous chest pain or shortness of breath leading up to this morning's event.  He was a longtime smoker that quit 7 years ago.  He rarely drinks alcohol maybe a beer on social  "occasions.  He denies any family history of coronary artery disease.  He was evaluated by Dr. Dunbar in 2018 for hypertension.  He had echocardiogram at that time that showed normal LVEF and no valvular abnormality.   He does report having a stress test greater than 7 years ago that was reportedly \"okay\".  He reports his blood pressures have been controlled lately     By the time of his arrival to [facility] ED his chest pain had resolved.  First 2 troponins were negative but third troponin has returned elevated at 0.061.  He was hypertensive on arrival at 174/102 and has been placed on IV nitroglycerin drip with improvement in his blood pressure.  EKG shows normal sinus rhythm with LVH but no acute ischemia.  He has been pretreated with 324 mg aspirin.    Allergies   Allergen Reactions   • Atorvastatin Myalgia       Prior to Admission medications    Medication Sig Start Date End Date Taking? Authorizing Provider   carbidopa-levodopa (SINEMET)  MG per tablet 2 tablets Daily. 3/14/18  Yes Provider, MD Maday   gabapentin (NEURONTIN) 300 MG capsule 1 capsule 2 (Two) Times a Day. 3/14/18  Yes Provider, MD Maday   losartan (COZAAR) 50 MG tablet TAKE 1 TABLET BY MOUTH ONCE DAILY AT NIGHT 1/31/22  Yes Salome Knott MD   Ibuprofen 200 MG capsule Take  by mouth.    Provider, MD Maday   rosuvastatin (CRESTOR) 20 MG tablet Take 1 tablet by mouth once daily 8/26/21   Salome Knott MD       Past Medical History:   Diagnosis Date   • Benign colon polyp     Dx 2004 (rectal polyp ? path).   • Bilateral hearing loss     Since approx 2000 (L>R).   • BPH (benign prostatic hyperplasia)     Sees Dr. Francisco   • Chronic pain of both knees     R>L.  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   • Essential hypertension     Dx approx 2000.   • History of echocardiogram 02/06/2018    EF 60-65%, trace MR/TR, mildly increased LV wall thickness.   • History " of varicella    • Hyperlipidemia     Dx  ().   • Restless legs syndrome (RLS)     Saw Jameel PERRY).   • Solitary nodule of right lobe of thyroid     Dx 2018 (approx) per Dr. Blank.  Patient states ultrasound showed a Calcium deposit.       Past Surgical History:   Procedure Laterality Date   • BLADDER STONE REMOVAL OPEN  2016    [facility]   • INGUINAL HERNIA REPAIR Right 1998    mesh placed (approx date)   • LASER OF PROSTATE W/ GREEN LIGHT PVP  2016    [facility]       Family History   Problem Relation Age of Onset   • No Known Problems Mother    • No Known Problems Father    • Colon cancer Brother         dx 40's, stage 4   • Diabetes Brother    • Lung cancer Brother         dx 60's mx to bone   • Diabetes Brother        Social History     Tobacco Use   Smoking Status Former Smoker   • Packs/day: 1.00   • Years: 50.00   • Pack years: 50.00   • Types: Cigarettes   • Start date:    • Quit date: 2015   • Years since quittin.0   Smokeless Tobacco Never Used       Social History     Substance and Sexual Activity   Alcohol Use Yes    Comment: 2 beers per week         Review of Systems:   Review of Systems   Constitutional: Negative for malaise/fatigue.   Eyes: Negative for vision loss in left eye and vision loss in right eye.   Cardiovascular: Positive for chest pain. Negative for dyspnea on exertion, near-syncope, orthopnea, palpitations, paroxysmal nocturnal dyspnea and syncope.   Musculoskeletal: Negative for myalgias.   Neurological: Negative for brief paralysis, excessive daytime sleepiness, focal weakness, numbness, paresthesias and weakness.   All other systems reviewed and are negative.           Vital Sign Min/Max for last 24 hours  Temp  Min: 97.5 °F (36.4 °C)  Max: 98.2 °F (36.8 °C)   BP  Min: 128/75  Max: 178/90   Pulse  Min: 56  Max: 94   Resp  Min: 18  Max: 18   SpO2  Min: 95 %  Max: 98 %   No data recorded    No intake or output data in the 24 hours ending [id number]           Constitutional:       Appearance: Healthy appearance. Well-developed.   Eyes:      General: Lids are normal. No scleral icterus.     Conjunctiva/sclera: Conjunctivae normal.   HENT:      Head: Normocephalic and atraumatic.   Neck:      Thyroid: No thyromegaly.      Vascular: No carotid bruit or JVD.   Pulmonary:      Effort: Pulmonary effort is normal.      Breath sounds: Normal breath sounds. No wheezing. No rhonchi. No rales.   Cardiovascular:      Normal rate. Regular rhythm.      Murmurs: There is no murmur.      No gallop. No rub.   Pulses:     Intact distal pulses.   Edema:     Peripheral edema absent.   Abdominal:      General: There is no distension.      Palpations: Abdomen is soft. There is no abdominal mass.   Musculoskeletal:      Cervical back: Normal range of motion. Skin:     General: Skin is warm and dry.      Findings: No rash.   Neurological:      General: No focal deficit present.      Mental Status: Alert and oriented to person, place, and time.      Gait: Gait is intact.   Psychiatric:         Attention and Perception: Attention normal.         Mood and Affect: Mood normal.         Behavior: Behavior normal.        I have examined the patient and agree with the above    Tele: Normal sinus rhythm    DATA REVIEW:    EKG (5/20/2022): Normal sinus rhythm without acute ischemic changes.  Minimal criteria for LVH    CXR (5/20/2022): Bilateral interstitial prominence which is a nonspecific finding and can be seen in atypical/viral infection.  Mild fluid overload.    ECHO (5/20/2022): Results pending      Results from last 7 days   Lab Units 05/20/22  0302   SODIUM mmol/L 138   POTASSIUM mmol/L 3.8   CHLORIDE mmol/L 102   BUN mg/dL 15   CREATININE mg/dL 0.90     Results from last 7 days   Lab Units 05/20/22  0933 05/20/22  0506 05/20/22  0302   TROPONIN T ng/mL 0.061* 0.026 <0.010     Results from last 7 days   Lab Units 05/20/22  0302   WBC 10*3/mm3 7.98   HEMOGLOBIN g/dL 14.6   HEMATOCRIT % 44.8    PLATELETS 10*3/mm3 225     Lab Results   Component Value Date    HGBA1C 5.6 06/04/2020     Lab Results   Component Value Date    CHOL 131 05/20/2022    TRIG 85 05/20/2022    HDL 48 05/20/2022    LDL 67 05/20/2022                Active Hospital Problems    Diagnosis    • **Unstable angina (HCC)      · Presentation [facility] 5/20/2022 with chest pain awoken from sleep.  Troponin x2 negative, third troponin elevated 0.061.  EKG without acute ischemia     • Hyperlipidemia    • Essential hypertension      · Echo (2/6/2019): LVEF 60-65%.  No significant valvular abnormality.     • Former smoker               Unstable angina  · Patient's symptoms are concerning for unstable angina.  Given elevation of third troponin would recommend cardiac catheterization.  Keep n.p.o. for procedure today  · Aspirin 81 mg daily  · Metoprolol tartrate 25 mg twice daily  · IV nitroglycerin drip infusing.  Currently chest pain-free      Hypertension  · Metoprolol tartrate 25 mg twice daily  · Losartan 50 mg daily  · Current /78  · IV nitroglycerin drip infusing    Hyperlipidemia  · Crestor 20 mg daily  · Lipid panel 1/27/2022 within normal limits      JOSE ANTONIO Rocha scribe for Dr. Velasquez Ugarte    Electronically signed by JOSE ANTONIO Prasad, 05/20/22, 10:43 AM EDT.  Patient with hypertension dyslipidemia with acute coronary syndrome and mildly elevated troponins.  Cardiac cath later today.    I have seen and examined the patient, I have reviewed the note, discussed the case with the advance practice clinician, made necessary changes and I agree with the final note.    Stef Ugarte MD  05/20/22  15:37 EDT

## 2022-05-20 NOTE — ED PROVIDER NOTES
EMERGENCY DEPARTMENT ENCOUNTER    Pt Name: Jasiel Ellis  MRN: [id number]  Pt :   1945  Room Number:  15/15  Date of encounter:  2022  PCP: Salome Knott MD  ED Provider: Dex Gutiérrez MD    Historian: Patient, spouse      HPI:  Chief Complaint: Chest pain, tachycardia        Context: Jasiel Ellis is a 76-year-old man with history of hypertension but no other cardiac history who presents because of acute onset chest tightness that woke him from sleep earlier this evening.  He says it was at rest and woke up with diffuse, severe chest tightness that he initially thought was heartburn but this was not consistent with his prior episodes of heartburn.  The pain was so severe that it was making it difficult for him to get a deep breath.  He says the pain lasted about 15 minutes and resolved spontaneously.  His wife measured his blood pressure to be elevated and his heart rate to be 171.  Other than time he knows of nothing that made it better and he currently says he is asymptomatic.  He was hypertensive when he arrived here and he is concerned that may be related.  Denies recent fever or illness.  Denies any leg swelling or immobilization.  Denies any other complaints at this time.    PAST MEDICAL HISTORY  Past Medical History:   Diagnosis Date   • Benign colon polyp     Dx  (rectal polyp ? path).   • Bilateral hearing loss     Since approx  (L>R).   • BPH (benign prostatic hyperplasia)     Sees Dr. Francisco   • Chronic pain of both knees     R>L.  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   • Essential hypertension     Dx approx .   • History of echocardiogram 2018    EF 60-65%, trace MR/TR, mildly increased LV wall thickness.   • History of varicella    • Hyperlipidemia     Dx  ().   • Restless legs syndrome (RLS)     Saw Jameel (LC).   • Solitary nodule of right lobe of thyroid     Dx  (approx) per   Abhay.  Patient states ultrasound showed a Calcium deposit.         PAST SURGICAL HISTORY  Past Surgical History:   Procedure Laterality Date   • BLADDER STONE REMOVAL OPEN  2016    [facility]   • INGUINAL HERNIA REPAIR Right 1998    mesh placed (approx date)   • LASER OF PROSTATE W/ GREEN LIGHT PVP  2016    [facility]         FAMILY HISTORY  Family History   Problem Relation Age of Onset   • No Known Problems Mother    • No Known Problems Father    • Colon cancer Brother         dx 40's, stage 4   • Diabetes Brother    • Lung cancer Brother         dx 60's mx to bone   • Diabetes Brother          SOCIAL HISTORY  Social History     Socioeconomic History   • Marital status:    Tobacco Use   • Smoking status: Former Smoker     Packs/day: 1.00     Years: 50.00     Pack years: 50.00     Types: Cigarettes     Start date:      Quit date: 2015     Years since quittin.0   • Smokeless tobacco: Never Used   Vaping Use   • Vaping Use: Never used   Substance and Sexual Activity   • Alcohol use: Yes     Comment: 2 beers per week   • Drug use: No   • Sexual activity: Yes     Partners: Female         ALLERGIES  Atorvastatin        REVIEW OF SYSTEMS  Review of Systems       All systems reviewed and negative except for those discussed in HPI.       PHYSICAL EXAM    I have reviewed the triage vital signs and nursing notes.    ED Triage Vitals [22 0251]   Temp Heart Rate Resp BP SpO2   97.5 °F (36.4 °C) 94 18 (!) 174/102 98 %      Temp src Heart Rate Source Patient Position BP Location FiO2 (%)   Oral Monitor Sitting Right arm --       Physical Exam  GENERAL:   Appears awake and alert in no acute distress  HENT: Nares patent.  EYES: No scleral icterus.  CV: Regular rhythm, regular rate.  RESPIRATORY: Normal effort.  No audible wheezes, rales or rhonchi.  ABDOMEN: Soft, nontender  MUSCULOSKELETAL: No deformities.   NEURO: Alert, moves all extremities, follows commands.  SKIN: Warm, dry, no rash visualized.        LAB  RESULTS  Recent Results (from the past 24 hour(s))   Troponin    Collection Time: 05/20/22  3:02 AM    Specimen: Blood   Result Value Ref Range    Troponin T <0.010 0.000 - 0.030 ng/mL   Comprehensive Metabolic Panel    Collection Time: 05/20/22  3:02 AM    Specimen: Blood   Result Value Ref Range    Glucose 122 (H) 65 - 99 mg/dL    BUN 15 8 - 23 mg/dL    Creatinine 0.90 0.76 - 1.27 mg/dL    Sodium 138 136 - 145 mmol/L    Potassium 3.8 3.5 - 5.2 mmol/L    Chloride 102 98 - 107 mmol/L    CO2 25.0 22.0 - 29.0 mmol/L    Calcium 9.6 8.6 - 10.5 mg/dL    Total Protein 7.9 6.0 - 8.5 g/dL    Albumin 4.10 3.50 - 5.20 g/dL    ALT (SGPT) 6 1 - 41 U/L    AST (SGOT) 19 1 - 40 U/L    Alkaline Phosphatase 68 39 - 117 U/L    Total Bilirubin 0.3 0.0 - 1.2 mg/dL    Globulin 3.8 gm/dL    A/G Ratio 1.1 g/dL    BUN/Creatinine Ratio 16.7 7.0 - 25.0    Anion Gap 11.0 5.0 - 15.0 mmol/L    eGFR 89.6 >60.0 mL/min/1.73   Lipase    Collection Time: 05/20/22  3:02 AM    Specimen: Blood   Result Value Ref Range    Lipase 54 13 - 60 U/L   BNP    Collection Time: 05/20/22  3:02 AM    Specimen: Blood   Result Value Ref Range    proBNP 151.2 0.0 - 900.0 pg/mL   Green Top (Gel)    Collection Time: 05/20/22  3:02 AM   Result Value Ref Range    Extra Tube Hold for add-ons.    Lavender Top    Collection Time: 05/20/22  3:02 AM   Result Value Ref Range    Extra Tube hold for add-on    Gold Top - SST    Collection Time: 05/20/22  3:02 AM   Result Value Ref Range    Extra Tube Hold for add-ons.    Gray Top    Collection Time: 05/20/22  3:02 AM   Result Value Ref Range    Extra Tube Hold for add-ons.    Light Blue Top    Collection Time: 05/20/22  3:02 AM   Result Value Ref Range    Extra Tube Hold for add-ons.    CBC Auto Differential    Collection Time: 05/20/22  3:02 AM    Specimen: Blood   Result Value Ref Range    WBC 7.98 3.40 - 10.80 10*3/mm3    RBC 5.37 4.14 - 5.80 10*6/mm3    Hemoglobin 14.6 13.0 - 17.7 g/dL    Hematocrit 44.8 37.5 - 51.0 %    MCV  83.4 79.0 - 97.0 fL    MCH 27.2 26.6 - 33.0 pg    MCHC 32.6 31.5 - 35.7 g/dL    RDW 13.1 12.3 - 15.4 %    RDW-SD 39.8 37.0 - 54.0 fl    MPV 10.2 6.0 - 12.0 fL    Platelets 225 140 - 450 10*3/mm3    Neutrophil % 50.6 42.7 - 76.0 %    Lymphocyte % 34.7 19.6 - 45.3 %    Monocyte % 10.3 5.0 - 12.0 %    Eosinophil % 3.4 0.3 - 6.2 %    Basophil % 0.6 0.0 - 1.5 %    Immature Grans % 0.4 0.0 - 0.5 %    Neutrophils, Absolute 4.04 1.70 - 7.00 10*3/mm3    Lymphocytes, Absolute 2.77 0.70 - 3.10 10*3/mm3    Monocytes, Absolute 0.82 0.10 - 0.90 10*3/mm3    Eosinophils, Absolute 0.27 0.00 - 0.40 10*3/mm3    Basophils, Absolute 0.05 0.00 - 0.20 10*3/mm3    Immature Grans, Absolute 0.03 0.00 - 0.05 10*3/mm3    nRBC 0.0 0.0 - 0.2 /100 WBC   Troponin    Collection Time: 05/20/22  5:06 AM    Specimen: Blood   Result Value Ref Range    Troponin T 0.026 0.000 - 0.030 ng/mL       If labs were ordered, I independently reviewed the results.        RADIOLOGY  XR Chest 1 View    Result Date: 5/20/2022  Single portable chest radiograph INDICATION:  Chest pain COMPARISON:  None. FINDINGS: Bilateral reticular opacities. Mildly elevated left hemidiaphragm. No evidence of focal consolidation, pleural effusion, or pneumothorax. The cardiomediastinal silhouette is within normal limits. No acute focal bony abnormalities are seen.     Bilateral interstitial prominence, a nonspecific finding which can be seen with atypical/viral infection, mild fluid overload, among other etiologies. Mildly elevated left hemidiaphragm Electronically signed by:  Lamar Valderrama M.D.  5/20/2022 1:32 AM Mountain Time      I ordered and reviewed the above noted radiographic studies.      I viewed images of chest x-ray which does not reveal any acute abnormalities that I can appreciate.    See radiologist's dictation for official interpretation.        PROCEDURES    Procedures    ECG 12 Lead   Preliminary Result         ECG 12 Lead    (Results Pending)       MEDICATIONS  GIVEN IN ER    Medications   sodium chloride 0.9 % flush 10 mL (has no administration in time range)   aspirin chewable tablet 324 mg (324 mg Oral Given 5/20/22 0440)         PROGRESS, DATA ANALYSIS, CONSULTS, AND MEDICAL DECISION MAKING    All labs have been independently reviewed by me.  All radiology studies have been reviewed by me and the radiologist dictating the report.   EKG's have been independently viewed and interpreted by me.      Differential diagnoses: MI, pneumonia, pneumothorax, pulmonary embolism, angina      ED Course as of 05/20/22 0848   Fri May 20, 2022   0408 In summary this is a 76-year-old man with history of hypertension but no other cardiac history who presents because of acute onset chest tightness that woke him from sleep earlier this evening.  He says it was at rest and woke up with diffuse, severe chest tightness that he initially thought was heartburn but this was not consistent with his prior episodes of heartburn.  The pain was so severe that it was making it difficult for him to get a deep breath.  He says the pain lasted about 15 minutes and resolved spontaneously.  Other than time he knows of nothing that made it better and he currently says he is asymptomatic.  He was hypertensive when he arrived here and he is concerned that may be related.  Denies recent fever or illness.  Denies any leg swelling or immobilization.  Denies any other complaints at this time. [CC]      ED Course User Index  [CC] Dex Gutiérrez MD       Patient arrived awake and alert mildly hypertensive otherwise vitals within normal limits.  His symptoms had resolved by the time he arrived here.  He was initially saying that he had tachycardia when he arrived here which she does not on his initial EKG or vitals but they then clarified that this was actually before they left their house that his wife had measured his heart rate at 170.  He has not had any tachycardia here.  ECG reveals LVH, but no acute  ST elevations or depressions.  Chest x-ray read as possible infection but he has not had cough, fever, leukocytosis and at this point I think it is mild edema.  Initial troponin is not elevated.  Heart score 6.  Repeat troponin remains not elevated however is more than double what the initial troponin was.  I am concerned that he has uptrending troponin and moderate risk heart score and contacted medicine team for admission and cardiology evaluation.      AS OF 08:48 EDT VITALS:    BP - 177/93  HR - 72  TEMP - 97.5 °F (36.4 °C) (Oral)  O2 SATS - 98%                  DIAGNOSIS  Final diagnoses:   Chest pain, unspecified type   Tachycardia   Essential hypertension         DISPOSITION  Admit               Dex Gutiérrez MD  05/20/22 4648

## 2022-05-20 NOTE — NURSING NOTE
Pt arrived to room from cath lab at 1645.  VSS on RA.  NSR per tele.  Right Radial compression device in place.  Cap refill < 3, no numbness/tingling noted, pulses present, extremities warm.  No bleeding present.  Patient expresses less anxiety now that the procedure is complete.

## 2022-05-20 NOTE — PLAN OF CARE
Pt arrived to floor.  Oriented to room.  No complaints of chest pain upon arrival to room.  Ntg gtt started to keep SBP <140.  Anticipate C later this afternoon.      Problem: Adult Inpatient Plan of Care  Goal: Plan of Care Review  Outcome: Ongoing, Progressing  Goal: Patient-Specific Goal (Individualized)  Outcome: Ongoing, Progressing  Goal: Absence of Hospital-Acquired Illness or Injury  Outcome: Ongoing, Progressing  Intervention: Identify and Manage Fall Risk  Recent Flowsheet Documentation  Taken 5/20/2022 1200 by Louisa Colunga RN  Safety Promotion/Fall Prevention:   assistive device/personal items within reach   clutter free environment maintained   nonskid shoes/slippers when out of bed   room organization consistent   safety round/check completed  Taken 5/20/2022 0944 by Louisa Colunga RN  Safety Promotion/Fall Prevention:   assistive device/personal items within reach   clutter free environment maintained   nonskid shoes/slippers when out of bed   room organization consistent   safety round/check completed  Intervention: Prevent Skin Injury  Recent Flowsheet Documentation  Taken 5/20/2022 1200 by Louisa Colunga RN  Body Position: position changed independently  Taken 5/20/2022 0944 by Louisa Colunga RN  Body Position: position changed independently  Skin Protection: adhesive use limited  Intervention: Prevent and Manage VTE (Venous Thromboembolism) Risk  Recent Flowsheet Documentation  Taken 5/20/2022 1200 by Louisa Colunga RN  Activity Management: up ad rbian  Taken 5/20/2022 0944 by Louisa Colunga RN  Activity Management: activity adjusted per tolerance  VTE Prevention/Management: (sq lovenox)   bilateral   dorsiflexion/plantar flexion performed   bleeding risk factor(s) identified  Intervention: Prevent Infection  Recent Flowsheet Documentation  Taken 5/20/2022 1200 by Louisa Colunga RN  Infection Prevention:   environmental surveillance performed   visitors restricted/screened   single  patient room provided   rest/sleep promoted   personal protective equipment utilized  Taken 5/20/2022 0944 by Louisa Colunga RN  Infection Prevention:   environmental surveillance performed   visitors restricted/screened   single patient room provided   rest/sleep promoted   personal protective equipment utilized  Goal: Optimal Comfort and Wellbeing  Outcome: Ongoing, Progressing  Intervention: Provide Person-Centered Care  Recent Flowsheet Documentation  Taken 5/20/2022 0944 by Louisa Colunga RN  Trust Relationship/Rapport:   care explained   choices provided   questions answered  Goal: Readiness for Transition of Care  Outcome: Ongoing, Progressing  Intervention: Mutually Develop Transition Plan  Recent Flowsheet Documentation  Taken 5/20/2022 1014 by Louisa Colunga RN  Transportation Anticipated: family or friend will provide  Patient/Family Anticipated Services at Transition: none  Patient/Family Anticipates Transition to: home with family  Taken 5/20/2022 1012 by Louisa Colunga RN  Equipment Currently Used at Home: bp cuff     Problem: Asthma Comorbidity  Goal: Maintenance of Asthma Control  Outcome: Ongoing, Progressing  Intervention: Maintain Asthma Symptom Control  Recent Flowsheet Documentation  Taken 5/20/2022 1200 by Louisa Colunga RN  Medication Review/Management: medications reviewed  Taken 5/20/2022 0944 by Louisa Colunga RN  Medication Review/Management: medications reviewed     Problem: Behavioral Health Comorbidity  Goal: Maintenance of Behavioral Health Symptom Control  Outcome: Ongoing, Progressing  Intervention: Maintain Behavioral Health Symptom Control  Recent Flowsheet Documentation  Taken 5/20/2022 1200 by Louisa Colunga RN  Medication Review/Management: medications reviewed  Taken 5/20/2022 0944 by Louisa Colunga RN  Medication Review/Management: medications reviewed     Problem: COPD (Chronic Obstructive Pulmonary Disease) Comorbidity  Goal: Maintenance of COPD Symptom  Control  Outcome: Ongoing, Progressing  Intervention: Maintain COPD-Symptom Control  Recent Flowsheet Documentation  Taken 5/20/2022 1200 by Louisa Colunga RN  Medication Review/Management: medications reviewed  Taken 5/20/2022 0944 by Louisa Colunga RN  Supportive Measures: active listening utilized  Medication Review/Management: medications reviewed     Problem: Diabetes Comorbidity  Goal: Blood Glucose Level Within Targeted Range  Outcome: Ongoing, Progressing     Problem: Heart Failure Comorbidity  Goal: Maintenance of Heart Failure Symptom Control  Outcome: Ongoing, Progressing  Intervention: Maintain Heart Failure-Management  Recent Flowsheet Documentation  Taken 5/20/2022 1200 by Louisa Colunga RN  Medication Review/Management: medications reviewed  Taken 5/20/2022 0944 by Louisa Colunga RN  Medication Review/Management: medications reviewed     Problem: Hypertension Comorbidity  Goal: Blood Pressure in Desired Range  Outcome: Ongoing, Progressing  Intervention: Maintain Blood Pressure Management  Recent Flowsheet Documentation  Taken 5/20/2022 1200 by Louisa Colunga RN  Medication Review/Management: medications reviewed  Taken 5/20/2022 0944 by Louisa Colunga RN  Medication Review/Management: medications reviewed     Problem: Obstructive Sleep Apnea Risk or Actual Comorbidity Management  Goal: Unobstructed Breathing During Sleep  Outcome: Ongoing, Progressing     Problem: Osteoarthritis Comorbidity  Goal: Maintenance of Osteoarthritis Symptom Control  Outcome: Ongoing, Progressing  Intervention: Maintain Osteoarthritis Symptom Control  Recent Flowsheet Documentation  Taken 5/20/2022 1200 by Luoisa Colunga RN  Activity Management: up ad brian  Medication Review/Management: medications reviewed  Taken 5/20/2022 0944 by Louisa Colunga RN  Activity Management: activity adjusted per tolerance  Medication Review/Management: medications reviewed     Problem: Pain Chronic (Persistent) (Comorbidity  Management)  Goal: Acceptable Pain Control and Functional Ability  Outcome: Ongoing, Progressing  Intervention: Manage Persistent Pain  Recent Flowsheet Documentation  Taken 5/20/2022 1200 by Louisa Colunga, RN  Medication Review/Management: medications reviewed  Taken 5/20/2022 0944 by Louisa Colunga, RN  Medication Review/Management: medications reviewed  Intervention: Optimize Psychosocial Wellbeing  Recent Flowsheet Documentation  Taken 5/20/2022 0944 by Louisa Colunga, RN  Supportive Measures: active listening utilized  Diversional Activities:   television   smartphone     Problem: Seizure Disorder Comorbidity  Goal: Maintenance of Seizure Control  Outcome: Ongoing, Progressing

## 2022-05-20 NOTE — PROGRESS NOTES
Pharmacokinetic Consult - Enoxaparin Dosing  Jasiel Ellis is a 76 y.o. male who has been consulted for enoxaparin dosing for unstable angina.    Relevant clinical data and objective history reviewed:  Results from last 7 days   Lab Units 05/20/22  0302   CREATININE mg/dL 0.90     Estimated Creatinine Clearance: 72.8 mL/min (by C-G formula based on SCr of 0.9 mg/dL).  No intake/output data recorded.  Patient weight: 83 kg (183 lb)    Diagnosis: Unstable angina  Consulting Provider: Hospitalists      Assessment/Plan  1. Give enoxaparin 1 mg/kg (80 mg) every 12 hours. Last dose was given 5/20 at 0953. Next dose due 5/20 at 2100.    2. Pharmacy will continue to monitor patient's renal function for further potential dose adjustments.      Jose J Araujo, PharmD, BCPS  5/20/2022  11:12 EDT

## 2022-05-20 NOTE — H&P
[facility] Medicine Services  HISTORY AND PHYSICAL    Patient Name: Jasiel Ellis  : 1945  MRN: [id number]  Primary Care Physician: Salome Knott MD  Date of admission: 2022    Subjective   Subjective     Chief Complaint:  Chest pain 4:00 this morning    HPI:  Jasiel Ellis is a 76 y.o. male with past medical history of essential hypertension, dyslipidemia, restless leg syndrome who presented to the hospital with chest pain that started at 4:00 this morning.  He reported that chest pain woke him up from sleep and felt like something heavy sitting on his chest, 10/10 in intensity, lasting for 15 minutes with no refill or radiation associated with lightheadedness.  His wife checked his blood pressure and it was 170/110 and his pulse rate was 171 bpm so she brought him to the hospital to be evaluated.  When he attempted to leave the house (15 minutes after the onset of chest pain), his chest pain spontaneously resolved.  Denied any fever or chills.  No cough.  No shortness of breath    In ER, 2 troponins were negative.  EKG with LVH without ischemic changes.  Chest x-ray was done and showed bilateral pulmonary congestion.  Patient will be admitted to the hospitalist service for further evaluation    Review of Systems   General : no fevers, chills  CVS: No chest pain, palpitations  Respiratory: No cough, dyspnea  GI: No N/V/D, abd pain  10 point review of systems is negative except for what is mentioned in HPI    Personal History     Past Medical History:   Diagnosis Date   • Benign colon polyp     Dx  (rectal polyp ? path).   • Bilateral hearing loss     Since approx  (L>R).   • BPH (benign prostatic hyperplasia)     Sees Dr. Francisco   • Chronic pain of both knees     R>L.  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   • Essential hypertension     Dx approx .   • History of echocardiogram 2018    EF 60-65%, trace  MR/TR, mildly increased LV wall thickness.   • History of varicella    • Hyperlipidemia     Dx 8/04 ().   • Restless legs syndrome (RLS)     Saw Jameel PERRY).   • Solitary nodule of right lobe of thyroid     Dx 2018 (approx) per Dr. Blank.  Patient states ultrasound showed a Calcium deposit.     Past Surgical History:   Procedure Laterality Date   • BLADDER STONE REMOVAL OPEN  2016    [facility]   • INGUINAL HERNIA REPAIR Right 1998    mesh placed (approx date)   • LASER OF PROSTATE W/ GREEN LIGHT PVP  2016    [facility]     Family History:  family history includes Colon cancer in his brother; Diabetes in his brother and brother; Lung cancer in his brother; No Known Problems in his father and mother. Otherwise pertinent FHx was reviewed and unremarkable.     Social History:  reports that he quit smoking about 7 years ago. His smoking use included cigarettes. He started smoking about 57 years ago. He has a 50.00 pack-year smoking history. He has never used smokeless tobacco. He reports current alcohol use. He reports that he does not use drugs.  Social History     Social History Narrative   • Not on file     Medications:  Available home medication information reviewed.  (Not in a hospital admission)    Allergies   Allergen Reactions   • Atorvastatin Myalgia     Objective   Objective     Vital Signs:   Temp:  [97.5 °F (36.4 °C)] 97.5 °F (36.4 °C)  Heart Rate:  [66-94] 72  Resp:  [18] 18  BP: (143-177)/() 177/93     Physical Exam   General: Comfortable, slightly anxious, not in any acute distress, appears stated age, conversant and cooperative  Head: Atraumatic and normocephalic, without obvious abnormality  Eyes:   Conjunctivae and sclerae normal, no Icterus. No pallor  Ears:  Ears appear intact with no abnormalities noted  Throat: No oral lesions, no thrush, oral mucosa moist  Neck: Supple, trachea midline, no thyromegaly  Back:   No kyphoscoliosis present. No tenderness to palpation,   no sacral  edema  Lungs: Equal air entry bilaterally.  Fine crackles up to mid lung zones.    Heart:  Normal S1 and S2, no murmur, no gallop, No JVD, no lower extremity swelling  Abdomen:  Soft, no tenderness, no organomegaly, normal bowel sounds  Normal bowel sounds, no masses, no organomegaly. Soft, nontender, nondistended, no guarding, no rebound tenderness.  Extremities: No gross abnormalities, no clubbing, pulses palpable and equal bilaterally  Skin: No bleeding, bruising or rash, normal skin turgor and elasticity  Neurologic: Cranial nerves appear intact with no evidence of facial asymmetry, normal motor and sensory functions in all 4 extremities  Psych: Alert and oriented x 3, normal mood    Result Review:  I have personally reviewed the results from the time of this admission to 5/20/2022 08:40 EDT and agree with these findings:  [x]  Laboratory  []  Microbiology  [x]  Radiology  []  EKG/Telemetry   []  Cardiology/Vascular   []  Pathology  []  Old records  []  Other:    LAB RESULTS:      Lab 05/20/22  0302   WBC 7.98   HEMOGLOBIN 14.6   HEMATOCRIT 44.8   PLATELETS 225   NEUTROS ABS 4.04   IMMATURE GRANS (ABS) 0.03   LYMPHS ABS 2.77   MONOS ABS 0.82   EOS ABS 0.27   MCV 83.4         Lab 05/20/22  0302   SODIUM 138   POTASSIUM 3.8   CHLORIDE 102   CO2 25.0   ANION GAP 11.0   BUN 15   CREATININE 0.90   EGFR 89.6   GLUCOSE 122*   CALCIUM 9.6         Lab 05/20/22  0302   TOTAL PROTEIN 7.9   ALBUMIN 4.10   GLOBULIN 3.8   ALT (SGPT) 6   AST (SGOT) 19   BILIRUBIN 0.3   ALK PHOS 68   LIPASE 54         Lab 05/20/22  0506 05/20/22  0302   PROBNP  --  151.2   TROPONIN T 0.026 <0.010       UA    Urinalysis 7/16/21   Ketones, UA Negative   Leukocytes, UA Negative           Microbiology Results (last 10 days)     ** No results found for the last 240 hours. **        XR Chest 1 View    Result Date: 5/20/2022  Single portable chest radiograph INDICATION:  Chest pain COMPARISON:  None. FINDINGS: Bilateral reticular opacities. Mildly  elevated left hemidiaphragm. No evidence of focal consolidation, pleural effusion, or pneumothorax. The cardiomediastinal silhouette is within normal limits. No acute focal bony abnormalities are seen.     Impression: Bilateral interstitial prominence, a nonspecific finding which can be seen with atypical/viral infection, mild fluid overload, among other etiologies. Mildly elevated left hemidiaphragm Electronically signed by:  Lamar Valderrama M.D.  5/20/2022 1:32 AM Mountain Time  Results for orders placed in visit on 02/06/18    SCANNED - ECHOCARDIOGRAM    Assessment & Plan   Assessment & Plan     Active Hospital Problems    Diagnosis  POA   • Chest pain, unspecified type [R07.9]  Yes     Summary :  72 years old male with past medical history of essential hypertension and dyslipidemia presented to the hospital with unstable angina    Assessment and plan:  Unstable angina  Uncontrolled hypertension  · Chest pain is typical anginal, awakening him from sleep  · 2 troponins are negative, third troponin is pending.  EKG with no dynamic ischemic changes  · Pulmonary congestion noted on exam and and chest x-ray  · Blood pressure is uncontrolled with systolic in the 170s and diastolic in the 110  · Will be admitted to telemetry floor  · Start ACS protocol with therapeutic Lovenox, low-dose metoprolol, high intensity statins.  Already received loading dose aspirin 325 mg daily, continue 81 mg daily.  Start nitro drip for preload reduction and to help with blood pressure control  · He takes his losartan 50 mg daily nightly.  I will give another dose of losartan 50 mg now and continue nitro drip  · Obtain echocardiogram  · Discussed the case with Dr. Willams/cardiology who agreed to consult  · Keep the patient n.p.o. in case he needs further ischemic evaluation    Dyslipidemia  · LDL from January 2022 is 69  · On rosuvastatin 20 mg daily  · Check repeat lipid profile    Restless leg syndrome  · On nightly dose Sinemet and  gabapentin    DVT prophylaxis: Lovenox full code    CODE STATUS: Full  There are no questions and answers to display.     Roxana Lee MD  05/20/22

## 2022-05-21 ENCOUNTER — READMISSION MANAGEMENT (OUTPATIENT)
Dept: CALL CENTER | Facility: HOSPITAL | Age: 77
End: 2022-05-21

## 2022-05-21 VITALS
SYSTOLIC BLOOD PRESSURE: 158 MMHG | HEART RATE: 67 BPM | HEIGHT: 66 IN | RESPIRATION RATE: 16 BRPM | TEMPERATURE: 97.9 F | OXYGEN SATURATION: 96 % | DIASTOLIC BLOOD PRESSURE: 87 MMHG | BODY MASS INDEX: 29.41 KG/M2 | WEIGHT: 183 LBS

## 2022-05-21 PROBLEM — I21.4 NSTEMI (NON-ST ELEVATED MYOCARDIAL INFARCTION): Status: ACTIVE | Noted: 2022-05-21

## 2022-05-21 PROBLEM — I25.119 CORONARY ARTERY DISEASE INVOLVING NATIVE CORONARY ARTERY OF NATIVE HEART WITH ANGINA PECTORIS: Status: ACTIVE | Noted: 2022-05-20

## 2022-05-21 PROBLEM — I25.110 CORONARY ARTERY DISEASE INVOLVING NATIVE CORONARY ARTERY OF NATIVE HEART WITH UNSTABLE ANGINA PECTORIS (HCC): Status: ACTIVE | Noted: 2022-05-20

## 2022-05-21 LAB
ANION GAP SERPL CALCULATED.3IONS-SCNC: 13 MMOL/L (ref 5–15)
BASOPHILS # BLD AUTO: 0.04 10*3/MM3 (ref 0–0.2)
BASOPHILS NFR BLD AUTO: 0.5 % (ref 0–1.5)
BUN SERPL-MCNC: 13 MG/DL (ref 8–23)
BUN/CREAT SERPL: 17.3 (ref 7–25)
CALCIUM SPEC-SCNC: 9.1 MG/DL (ref 8.6–10.5)
CHLORIDE SERPL-SCNC: 100 MMOL/L (ref 98–107)
CHOLEST SERPL-MCNC: 116 MG/DL (ref 0–200)
CO2 SERPL-SCNC: 21 MMOL/L (ref 22–29)
CREAT SERPL-MCNC: 0.75 MG/DL (ref 0.76–1.27)
DEPRECATED RDW RBC AUTO: 39.5 FL (ref 37–54)
EGFRCR SERPLBLD CKD-EPI 2021: 94.7 ML/MIN/1.73
EOSINOPHIL # BLD AUTO: 0.15 10*3/MM3 (ref 0–0.4)
EOSINOPHIL NFR BLD AUTO: 1.8 % (ref 0.3–6.2)
ERYTHROCYTE [DISTWIDTH] IN BLOOD BY AUTOMATED COUNT: 13 % (ref 12.3–15.4)
GLUCOSE SERPL-MCNC: 94 MG/DL (ref 65–99)
HCT VFR BLD AUTO: 42 % (ref 37.5–51)
HDLC SERPL-MCNC: 42 MG/DL (ref 40–60)
HGB BLD-MCNC: 13.5 G/DL (ref 13–17.7)
IMM GRANULOCYTES # BLD AUTO: 0.02 10*3/MM3 (ref 0–0.05)
IMM GRANULOCYTES NFR BLD AUTO: 0.2 % (ref 0–0.5)
LDLC SERPL CALC-MCNC: 52 MG/DL (ref 0–100)
LDLC/HDLC SERPL: 1.19 {RATIO}
LYMPHOCYTES # BLD AUTO: 1.64 10*3/MM3 (ref 0.7–3.1)
LYMPHOCYTES NFR BLD AUTO: 20 % (ref 19.6–45.3)
MCH RBC QN AUTO: 27.1 PG (ref 26.6–33)
MCHC RBC AUTO-ENTMCNC: 32.1 G/DL (ref 31.5–35.7)
MCV RBC AUTO: 84.3 FL (ref 79–97)
MONOCYTES # BLD AUTO: 0.65 10*3/MM3 (ref 0.1–0.9)
MONOCYTES NFR BLD AUTO: 7.9 % (ref 5–12)
NEUTROPHILS NFR BLD AUTO: 5.7 10*3/MM3 (ref 1.7–7)
NEUTROPHILS NFR BLD AUTO: 69.6 % (ref 42.7–76)
NRBC BLD AUTO-RTO: 0 /100 WBC (ref 0–0.2)
PLATELET # BLD AUTO: 197 10*3/MM3 (ref 140–450)
PMV BLD AUTO: 9.9 FL (ref 6–12)
POTASSIUM SERPL-SCNC: 4.2 MMOL/L (ref 3.5–5.2)
RBC # BLD AUTO: 4.98 10*6/MM3 (ref 4.14–5.8)
SODIUM SERPL-SCNC: 134 MMOL/L (ref 136–145)
TRIGL SERPL-MCNC: 120 MG/DL (ref 0–150)
VLDLC SERPL-MCNC: 22 MG/DL (ref 5–40)
WBC NRBC COR # BLD: 8.2 10*3/MM3 (ref 3.4–10.8)

## 2022-05-21 PROCEDURE — 63710000001 CLOPIDOGREL 75 MG TABLET: Performed by: INTERNAL MEDICINE

## 2022-05-21 PROCEDURE — A9270 NON-COVERED ITEM OR SERVICE: HCPCS | Performed by: INTERNAL MEDICINE

## 2022-05-21 PROCEDURE — 80048 BASIC METABOLIC PNL TOTAL CA: CPT | Performed by: INTERNAL MEDICINE

## 2022-05-21 PROCEDURE — 63710000001 METOPROLOL TARTRATE 25 MG TABLET: Performed by: INTERNAL MEDICINE

## 2022-05-21 PROCEDURE — 93010 ELECTROCARDIOGRAM REPORT: CPT | Performed by: INTERNAL MEDICINE

## 2022-05-21 PROCEDURE — G0378 HOSPITAL OBSERVATION PER HR: HCPCS

## 2022-05-21 PROCEDURE — 63710000001 ASPIRIN 81 MG TABLET DELAYED-RELEASE: Performed by: INTERNAL MEDICINE

## 2022-05-21 PROCEDURE — 85025 COMPLETE CBC W/AUTO DIFF WBC: CPT | Performed by: INTERNAL MEDICINE

## 2022-05-21 PROCEDURE — 80061 LIPID PANEL: CPT | Performed by: INTERNAL MEDICINE

## 2022-05-21 PROCEDURE — 63710000001 ROSUVASTATIN 20 MG TABLET: Performed by: INTERNAL MEDICINE

## 2022-05-21 PROCEDURE — 93005 ELECTROCARDIOGRAM TRACING: CPT | Performed by: INTERNAL MEDICINE

## 2022-05-21 PROCEDURE — 99213 OFFICE O/P EST LOW 20 MIN: CPT | Performed by: INTERNAL MEDICINE

## 2022-05-21 RX ORDER — ASPIRIN 81 MG/1
81 TABLET ORAL DAILY
Qty: 90 TABLET | Refills: 1 | Status: SHIPPED | OUTPATIENT
Start: 2022-05-21

## 2022-05-21 RX ORDER — CLOPIDOGREL BISULFATE 75 MG/1
75 TABLET ORAL DAILY
Qty: 90 TABLET | Refills: 1 | Status: SHIPPED | OUTPATIENT
Start: 2022-05-21 | End: 2022-11-10 | Stop reason: SDUPTHER

## 2022-05-21 RX ADMIN — METOPROLOL TARTRATE 25 MG: 25 TABLET, FILM COATED ORAL at 09:18

## 2022-05-21 RX ADMIN — ASPIRIN 81 MG: 81 TABLET, COATED ORAL at 09:18

## 2022-05-21 RX ADMIN — Medication 10 ML: at 09:20

## 2022-05-21 RX ADMIN — CLOPIDOGREL BISULFATE 75 MG: 75 TABLET ORAL at 09:18

## 2022-05-21 RX ADMIN — ROSUVASTATIN 20 MG: 20 TABLET, FILM COATED ORAL at 09:18

## 2022-05-21 NOTE — PLAN OF CARE
Goal Outcome Evaluation:  Plan of Care Reviewed With: patient        Progress: improving       VSS, sinus bradycardia per monitor, O2 on RA. No c/o chest pain this shift. TR band removed per protocol and occlusive dressing applied.       Problem: Adult Inpatient Plan of Care  Goal: Absence of Hospital-Acquired Illness or Injury  Intervention: Prevent Skin Injury  Recent Flowsheet Documentation  Taken 5/21/2022 0400 by Trisha Arteaga RN  Body Position: position changed independently  Taken 5/21/2022 0200 by Trisha Arteaga RN  Body Position: position changed independently  Taken 5/21/2022 0000 by Trisha Arteaga RN  Body Position: position changed independently  Taken 5/20/2022 2330 by Trisha Arteaga RN  Body Position: position changed independently  Taken 5/20/2022 2230 by Trisha Arteaga RN  Body Position: position changed independently  Taken 5/20/2022 2000 by Trisha Arteaga RN  Body Position: position changed independently

## 2022-05-21 NOTE — DISCHARGE INSTR - ACTIVITY
Can wash and dry radial site with soap and water, pat dry- DO NOT RUB.  Can shower after 24-48 hours after cardiac catheterization.   No driving for 24 hours after procedure.   Do not flex/bend arm for 24 hours, do not push/pull heavy objects.  Do not lift anything greater than 10 lbs for 5 days after procedure (gallon of milk weights 8 lbs)  If bleeding occurs, hold pressure above puncture site and call 911 if bleeding does not stop.   If the site becomes red, swells, hot/warm, or has drainage, call MD- signs of infection.

## 2022-05-21 NOTE — OUTREACH NOTE
Prep Survey    Flowsheet Row Responses   [facility] facility patient discharged from? [place]   Is LACE score < 7 ? Yes   Emergency Room discharge w/ pulse ox? No   Eligibility [facility]   Date of Admission 05/20/22   Date of Discharge 05/21/22   Discharge Disposition Home or Self Care   Discharge diagnosis NSTEMI   Does the patient have one of the following disease processes/diagnoses(primary or secondary)? Acute MI (STEMI,NSTEMI)   Does the patient have Home health ordered? No   Is there a DME ordered? No   Prep survey completed? Yes          LAURA SIERRA - Registered Nurse

## 2022-05-21 NOTE — PLAN OF CARE
Problem: Adult Inpatient Plan of Care  Goal: Plan of Care Review  Outcome: Adequate for Care Transition  Goal: Patient-Specific Goal (Individualized)  Outcome: Adequate for Care Transition  Goal: Absence of Hospital-Acquired Illness or Injury  Outcome: Adequate for Care Transition  Intervention: Identify and Manage Fall Risk  Recent Flowsheet Documentation  Taken 5/21/2022 1000 by Louisa Colunga RN  Safety Promotion/Fall Prevention:   assistive device/personal items within reach   clutter free environment maintained   nonskid shoes/slippers when out of bed   room organization consistent   safety round/check completed  Taken 5/21/2022 0800 by Louisa Colunga RN  Safety Promotion/Fall Prevention:   assistive device/personal items within reach   clutter free environment maintained   nonskid shoes/slippers when out of bed   room organization consistent   safety round/check completed  Intervention: Prevent Skin Injury  Recent Flowsheet Documentation  Taken 5/21/2022 1000 by Louisa Colunga RN  Body Position: position changed independently  Taken 5/21/2022 0800 by Louisa Colunga RN  Body Position: position changed independently  Skin Protection: adhesive use limited  Intervention: Prevent and Manage VTE (Venous Thromboembolism) Risk  Recent Flowsheet Documentation  Taken 5/21/2022 0800 by Louisa Colunga RN  Activity Management: activity adjusted per tolerance  VTE Prevention/Management:   bilateral   dorsiflexion/plantar flexion performed   bleeding risk factor(s) identified  Intervention: Prevent Infection  Recent Flowsheet Documentation  Taken 5/21/2022 1000 by Louisa Colunga RN  Infection Prevention:   environmental surveillance performed   visitors restricted/screened   single patient room provided   rest/sleep promoted   personal protective equipment utilized  Taken 5/21/2022 0800 by Louisa Colunga RN  Infection Prevention:   environmental surveillance performed   visitors restricted/screened   single  patient room provided   rest/sleep promoted   personal protective equipment utilized  Goal: Optimal Comfort and Wellbeing  Outcome: Adequate for Care Transition  Intervention: Provide Person-Centered Care  Recent Flowsheet Documentation  Taken 5/21/2022 0800 by Louisa Colunga RN  Trust Relationship/Rapport:   care explained   choices provided   questions answered  Goal: Readiness for Transition of Care  Outcome: Adequate for Care Transition     Problem: Asthma Comorbidity  Goal: Maintenance of Asthma Control  Outcome: Adequate for Care Transition  Intervention: Maintain Asthma Symptom Control  Recent Flowsheet Documentation  Taken 5/21/2022 1000 by Louisa Colunga RN  Medication Review/Management: medications reviewed  Taken 5/21/2022 0800 by Louisa Colunga RN  Medication Review/Management: medications reviewed     Problem: Behavioral Health Comorbidity  Goal: Maintenance of Behavioral Health Symptom Control  Outcome: Adequate for Care Transition  Intervention: Maintain Behavioral Health Symptom Control  Recent Flowsheet Documentation  Taken 5/21/2022 1000 by Louisa Colunga RN  Medication Review/Management: medications reviewed  Taken 5/21/2022 0800 by Louisa Colunga RN  Medication Review/Management: medications reviewed     Problem: COPD (Chronic Obstructive Pulmonary Disease) Comorbidity  Goal: Maintenance of COPD Symptom Control  Outcome: Adequate for Care Transition  Intervention: Maintain COPD-Symptom Control  Recent Flowsheet Documentation  Taken 5/21/2022 1000 by Louisa Colunga RN  Medication Review/Management: medications reviewed  Taken 5/21/2022 0800 by Louisa Colunga RN  Supportive Measures: active listening utilized  Medication Review/Management: medications reviewed     Problem: Diabetes Comorbidity  Goal: Blood Glucose Level Within Targeted Range  Outcome: Adequate for Care Transition     Problem: Heart Failure Comorbidity  Goal: Maintenance of Heart Failure Symptom Control  Outcome:  Adequate for Care Transition  Intervention: Maintain Heart Failure-Management  Recent Flowsheet Documentation  Taken 5/21/2022 1000 by Louisa Colunga RN  Medication Review/Management: medications reviewed  Taken 5/21/2022 0800 by Louisa Colunga RN  Medication Review/Management: medications reviewed     Problem: Hypertension Comorbidity  Goal: Blood Pressure in Desired Range  Outcome: Adequate for Care Transition  Intervention: Maintain Blood Pressure Management  Recent Flowsheet Documentation  Taken 5/21/2022 1000 by Louisa Colunga RN  Medication Review/Management: medications reviewed  Taken 5/21/2022 0800 by Louisa Colunga RN  Medication Review/Management: medications reviewed     Problem: Obstructive Sleep Apnea Risk or Actual Comorbidity Management  Goal: Unobstructed Breathing During Sleep  Outcome: Adequate for Care Transition     Problem: Osteoarthritis Comorbidity  Goal: Maintenance of Osteoarthritis Symptom Control  Outcome: Adequate for Care Transition  Intervention: Maintain Osteoarthritis Symptom Control  Recent Flowsheet Documentation  Taken 5/21/2022 1000 by Louisa Colunga RN  Medication Review/Management: medications reviewed  Taken 5/21/2022 0800 by Louisa Colunga RN  Activity Management: activity adjusted per tolerance  Medication Review/Management: medications reviewed     Problem: Pain Chronic (Persistent) (Comorbidity Management)  Goal: Acceptable Pain Control and Functional Ability  Outcome: Adequate for Care Transition  Intervention: Manage Persistent Pain  Recent Flowsheet Documentation  Taken 5/21/2022 1000 by Louisa Colunga RN  Medication Review/Management: medications reviewed  Taken 5/21/2022 0800 by Louisa Colunga RN  Medication Review/Management: medications reviewed  Intervention: Optimize Psychosocial Wellbeing  Recent Flowsheet Documentation  Taken 5/21/2022 0800 by Louisa Colunga RN  Supportive Measures: active listening utilized  Diversional Activities:    television   smartphone     Problem: Seizure Disorder Comorbidity  Goal: Maintenance of Seizure Control  Outcome: Adequate for Care Transition

## 2022-05-21 NOTE — DISCHARGE SUMMARY
Discharge Summary  [place], [place]    Patient Name: Jasiel Ellis  : 1945  MRN: [id number]    Date of Admission: 2022  Date of Discharge:  2022    IDENTIFICATION:  Jasiel Ellis is a 76 y.o. 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  · Echo (2020): LVEF 60%.  Normal functioning valves     • Hyperlipidemia LDL goal <70    • Essential hypertension      · Echo (2019): LVEF 60-65%.  No significant valvular abnormality.         Summary of Hospitalization:  76-year-old gentleman with no prior cardiac history presented to [facility] on 2022 after waking with substernal chest pressure.  He ruled in for myocardial infarction was barely elevated troponin.  Underwent cardiac catheterization on 2022 with Dr. Velasquez Ugarte.  Catheterization showed severe disease of a right posterior lateral branch which was treated with a drug-eluting stent.    Procedures Performed  Left Heart Cath  TABITHA to RPL         Pertinent Test Results:  Results from last 7 days   Lab Units 22  0440 22  0302   WBC 10*3/mm3 8.20 7.98   HEMOGLOBIN g/dL 13.5 14.6   HEMATOCRIT % 42.0 44.8   PLATELETS 10*3/mm3 197 225   SODIUM mmol/L 134* 138   POTASSIUM mmol/L 4.2 3.8   CHLORIDE mmol/L 100 102   CO2 mmol/L 21.0* 25.0   BUN mg/dL 13 15   CREATININE mg/dL 0.75* 0.90   GLUCOSE mg/dL 94 122*   CALCIUM mg/dL 9.1 9.6     Results from last 7 days   Lab Units 22  0302   BILIRUBIN mg/dL 0.3   ALK PHOS U/L 68   ALT (SGPT) U/L 6   AST (SGOT) U/L 19      Results from last 7 days   Lab Units 22  0440 22  0933   CHOLESTEROL mg/dL 116 131   TRIGLYCERIDES mg/dL 120 85   HDL CHOL mg/dL 42 48   LDL CHOL mg/dL 52 67     Results from last 7 days   Lab Units 22  1057   HEMOGLOBIN  A1C % 5.50     Lab Results   Component Value Date    TROPONINT 0.061 (C) 05/20/2022    TROPONINT 0.026 05/20/2022    TROPONINT <0.010 05/20/2022       XR Chest 1 View    Result Date: 5/20/2022  Impression: Bilateral interstitial prominence, a nonspecific finding which can be seen with atypical/viral infection, mild fluid overload, among other etiologies. Mildly elevated left hemidiaphragm Electronically signed by:  Lamar Valderrama M.D.  5/20/2022 1:32 AM Mountain Time          Results for orders placed during the hospital encounter of 05/20/22    Adult Transthoracic Echo Complete W/ Cont if Necessary Per Protocol    Interpretation Summary  · Estimated left ventricular EF = 60%  · The cardiac valves are anatomically and functionally normal.        Physical Exam at Discharge    Vital Signs  Temp:  [97.4 °F (36.3 °C)-97.9 °F (36.6 °C)] 97.9 °F (36.6 °C)  Heart Rate:  [54-86] 67  Resp:  [16-17] 16  BP: (111-164)/() 158/87    Constitutional:       Appearance: Healthy appearance. Well-developed.   Eyes:      General: Lids are normal. No scleral icterus.  HENT:      Head: Normocephalic and atraumatic.   Neck:      Thyroid: No thyroid mass.      Vascular: No carotid bruit or JVD. JVD normal.   Pulmonary:      Effort: Pulmonary effort is normal.      Breath sounds: Normal breath sounds.   Cardiovascular:      Normal rate. Regular rhythm.      Murmurs: There is no murmur.      No gallop.   Musculoskeletal:      Extremities: No clubbing present.Skin:     General: Skin is warm and dry. There is no cyanosis.   Neurological:      General: No focal deficit present.      Mental Status: Alert.   Psychiatric:         Attention and Perception: Attention normal.         Behavior: Behavior normal. Behavior is cooperative.          Discharge Disposition Home           Discharge Medications      New Medications      Instructions Start Date   aspirin 81 MG EC tablet   81 mg, Oral, Daily      clopidogrel 75 MG tablet  Commonly known  as: PLAVIX   75 mg, Oral, Daily      metoprolol tartrate 25 MG tablet  Commonly known as: LOPRESSOR   25 mg, Oral, Every 12 Hours Scheduled         Changes to Medications      Instructions Start Date   rosuvastatin 20 MG tablet  Commonly known as: CRESTOR  What changed: when to take this   Take 1 tablet by mouth once daily         Continue These Medications      Instructions Start Date   carbidopa-levodopa  MG per tablet  Commonly known as: SINEMET   1 tablet, Oral, 3 Times Daily      gabapentin 300 MG capsule  Commonly known as: NEURONTIN   1 capsule, Oral, 3 Times Daily      ibuprofen 200 MG tablet  Commonly known as: ADVIL,MOTRIN   1-2 tablets, Oral, Every 4 Hours PRN, OTC      losartan 50 MG tablet  Commonly known as: COZAAR   TAKE 1 TABLET BY MOUTH ONCE DAILY AT NIGHT             Discharge Diet: Cardiac    Activity at Discharge: Cardiac rehab in 2 weeks    No future appointments.  Additional Instructions for the Follow-ups that You Need to Schedule     Ambulatory Referral to Cardiac Rehab   As directed      Discharge Follow-up with Specialty: Velasquez Ugarte; 1 Month   As directed      Specialty: Velasquez Ugarte    Follow Up: 1 Month    Follow Up Details: Hospital FU for Unstable angina sp PCI RPL                      Electronically signed by Lorenzo Ritter IV, MD, 05/21/22, 10:32 AM EDT.    Time: Discharge 25 min

## 2022-05-22 LAB
QT INTERVAL: 352 MS
QTC INTERVAL: 423 MS

## 2022-05-23 ENCOUNTER — PATIENT MESSAGE (OUTPATIENT)
Dept: CARDIOLOGY | Facility: CLINIC | Age: 77
End: 2022-05-23

## 2022-05-23 ENCOUNTER — TRANSITIONAL CARE MANAGEMENT TELEPHONE ENCOUNTER (OUTPATIENT)
Dept: CALL CENTER | Facility: HOSPITAL | Age: 77
End: 2022-05-23

## 2022-05-23 ENCOUNTER — DOCUMENTATION (OUTPATIENT)
Dept: CARDIAC REHAB | Facility: HOSPITAL | Age: 77
End: 2022-05-23

## 2022-05-23 NOTE — OUTREACH NOTE
Call Center TCM Note    Flowsheet Row Responses   [facility] patient discharged from? [facility]   Does the patient have one of the following disease processes/diagnoses(primary or secondary)? Acute MI (STEMI,NSTEMI)   TCM attempt successful? Yes   Call start time 1733   Call end time 1737   Discharge diagnosis NSTEMI   Meds reviewed with patient/caregiver? Yes   Is the patient having any side effects they believe may be caused by any medication additions or changes? No   Does the patient have all prescriptions related to this admission filled (includes statins,anticoagulants,HTN meds,anti-arrhythmia meds) Yes   Is the patient taking all medications as directed (includes completed medication regime)? Yes   Comments regarding appointments Patient is awaiting a call back from Dr Ugarte's office with appt.    Does the patient have a primary care provider?  Yes   Does the patient have an appointment with their PCP,cardiologist,or clinic within 7 days of discharge? Yes   Comments regarding PCP Has an hospital followup appt with Dr Knott tomorrow 5/24/2022   Has the patient kept scheduled appointments due by today? N/A   Psychosocial issues? No   Did the patient receive a copy of their discharge instructions? Yes   Nursing interventions Reviewed instructions with patient   What is the patient's perception of their health status since discharge? Improving   Nursing interventions Nurse provided patient education   Is the patient/caregiver able to teach back signs and symptoms of when to call for help immediately: Sudden chest discomfort, Sudden discomfort in arms, back, neck or jaw   Nursing interventions Nurse provided patient education   Is the patient/caregiver able to teach back ways to prevent a second heart attack: Take medications, Follow up with MD, Manage risk factors   If the patient is a current smoker, are they able to teach back resources for cessation? Not a smoker   Is the patient/caregiver able to teach  back the hierarchy of who to call/visit for symptoms/problems? PCP, Specialist, Home health nurse, Urgent Care, ED, 911 Yes   TCM call completed? Yes          Axel Edmondson RN    5/23/2022, 17:37 EDT

## 2022-05-24 ENCOUNTER — HOSPITAL ENCOUNTER (OUTPATIENT)
Dept: GENERAL RADIOLOGY | Facility: HOSPITAL | Age: 77
Discharge: HOME | End: 2022-05-24

## 2022-05-24 ENCOUNTER — OFFICE VISIT (OUTPATIENT)
Dept: INTERNAL MEDICINE | Facility: CLINIC | Age: 77
End: 2022-05-24

## 2022-05-24 ENCOUNTER — HOSPITAL ENCOUNTER (EMERGENCY)
Facility: HOSPITAL | Age: 77
Discharge: HOME | End: 2022-05-24
Attending: STUDENT IN AN ORGANIZED HEALTH CARE EDUCATION/TRAINING PROGRAM

## 2022-05-24 VITALS
BODY MASS INDEX: 29.46 KG/M2 | RESPIRATION RATE: 20 BRPM | WEIGHT: 182.5 LBS | TEMPERATURE: 98.6 F | HEART RATE: 72 BPM | SYSTOLIC BLOOD PRESSURE: 124 MMHG | DIASTOLIC BLOOD PRESSURE: 60 MMHG

## 2022-05-24 VITALS
WEIGHT: 182 LBS | HEART RATE: 63 BPM | OXYGEN SATURATION: 100 % | DIASTOLIC BLOOD PRESSURE: 84 MMHG | SYSTOLIC BLOOD PRESSURE: 148 MMHG | TEMPERATURE: 97.7 F | HEIGHT: 66 IN | BODY MASS INDEX: 29.25 KG/M2 | RESPIRATION RATE: 18 BRPM

## 2022-05-24 DIAGNOSIS — I10 PRIMARY HYPERTENSION: ICD-10-CM

## 2022-05-24 DIAGNOSIS — I25.10 CORONARY ARTERY DISEASE INVOLVING NATIVE CORONARY ARTERY OF NATIVE HEART WITHOUT ANGINA PECTORIS: Primary | ICD-10-CM

## 2022-05-24 DIAGNOSIS — I10 ELEVATED BLOOD PRESSURE READING WITH DIAGNOSIS OF HYPERTENSION: Primary | ICD-10-CM

## 2022-05-24 DIAGNOSIS — M79.604 RIGHT LEG PAIN: ICD-10-CM

## 2022-05-24 DIAGNOSIS — D72.829 LEUKOCYTOSIS, UNSPECIFIED TYPE: ICD-10-CM

## 2022-05-24 DIAGNOSIS — Z98.890 STATUS POST LEFT HEART CATHETERIZATION: ICD-10-CM

## 2022-05-24 LAB
ALBUMIN SERPL-MCNC: 4.4 G/DL (ref 3.5–5.2)
ALBUMIN/GLOB SERPL: 1.4 G/DL
ALP SERPL-CCNC: 64 U/L (ref 39–117)
ALT SERPL W P-5'-P-CCNC: 26 U/L (ref 1–41)
ANION GAP SERPL CALCULATED.3IONS-SCNC: 9 MMOL/L (ref 5–15)
AST SERPL-CCNC: 27 U/L (ref 1–40)
BASOPHILS # BLD AUTO: 0.06 10*3/MM3 (ref 0–0.2)
BASOPHILS NFR BLD AUTO: 0.5 % (ref 0–1.5)
BILIRUB SERPL-MCNC: 0.3 MG/DL (ref 0–1.2)
BUN SERPL-MCNC: 23 MG/DL (ref 8–23)
BUN/CREAT SERPL: 23.7 (ref 7–25)
CALCIUM SPEC-SCNC: 9.8 MG/DL (ref 8.6–10.5)
CHLORIDE SERPL-SCNC: 100 MMOL/L (ref 98–107)
CO2 SERPL-SCNC: 26 MMOL/L (ref 22–29)
CREAT SERPL-MCNC: 0.97 MG/DL (ref 0.76–1.27)
D DIMER PPP FEU-MCNC: 0.49 MCGFEU/ML (ref 0.01–0.5)
DEPRECATED RDW RBC AUTO: 39.4 FL (ref 37–54)
EGFRCR SERPLBLD CKD-EPI 2021: 81.9 ML/MIN/1.73
EOSINOPHIL # BLD AUTO: 0.22 10*3/MM3 (ref 0–0.4)
EOSINOPHIL NFR BLD AUTO: 2 % (ref 0.3–6.2)
ERYTHROCYTE [DISTWIDTH] IN BLOOD BY AUTOMATED COUNT: 13.1 % (ref 12.3–15.4)
GLOBULIN UR ELPH-MCNC: 3.1 GM/DL
GLUCOSE SERPL-MCNC: 123 MG/DL (ref 65–99)
HCT VFR BLD AUTO: 41.3 % (ref 37.5–51)
HGB BLD-MCNC: 13.7 G/DL (ref 13–17.7)
IMM GRANULOCYTES # BLD AUTO: 0.03 10*3/MM3 (ref 0–0.05)
IMM GRANULOCYTES NFR BLD AUTO: 0.3 % (ref 0–0.5)
LYMPHOCYTES # BLD AUTO: 2.39 10*3/MM3 (ref 0.7–3.1)
LYMPHOCYTES NFR BLD AUTO: 21.3 % (ref 19.6–45.3)
MCH RBC QN AUTO: 27.6 PG (ref 26.6–33)
MCHC RBC AUTO-ENTMCNC: 33.2 G/DL (ref 31.5–35.7)
MCV RBC AUTO: 83.3 FL (ref 79–97)
MONOCYTES # BLD AUTO: 0.95 10*3/MM3 (ref 0.1–0.9)
MONOCYTES NFR BLD AUTO: 8.5 % (ref 5–12)
NEUTROPHILS NFR BLD AUTO: 67.4 % (ref 42.7–76)
NEUTROPHILS NFR BLD AUTO: 7.57 10*3/MM3 (ref 1.7–7)
NRBC BLD AUTO-RTO: 0 /100 WBC (ref 0–0.2)
PLATELET # BLD AUTO: 239 10*3/MM3 (ref 140–450)
PMV BLD AUTO: 10.2 FL (ref 6–12)
POTASSIUM SERPL-SCNC: 4.6 MMOL/L (ref 3.5–5.2)
PROT SERPL-MCNC: 7.5 G/DL (ref 6–8.5)
RBC # BLD AUTO: 4.96 10*6/MM3 (ref 4.14–5.8)
SODIUM SERPL-SCNC: 135 MMOL/L (ref 136–145)
WBC NRBC COR # BLD: 11.22 10*3/MM3 (ref 3.4–10.8)

## 2022-05-24 PROCEDURE — 80053 COMPREHEN METABOLIC PANEL: CPT

## 2022-05-24 PROCEDURE — 85025 COMPLETE CBC W/AUTO DIFF WBC: CPT

## 2022-05-24 PROCEDURE — 99283 EMERGENCY DEPT VISIT LOW MDM: CPT

## 2022-05-24 PROCEDURE — 1111F DSCHRG MED/CURRENT MED MERGE: CPT | Performed by: INTERNAL MEDICINE

## 2022-05-24 PROCEDURE — 93005 ELECTROCARDIOGRAM TRACING: CPT | Performed by: STUDENT IN AN ORGANIZED HEALTH CARE EDUCATION/TRAINING PROGRAM

## 2022-05-24 PROCEDURE — 99495 TRANSJ CARE MGMT MOD F2F 14D: CPT | Performed by: INTERNAL MEDICINE

## 2022-05-24 PROCEDURE — 71046 X-RAY EXAM CHEST 2 VIEWS: CPT

## 2022-05-24 PROCEDURE — 85379 FIBRIN DEGRADATION QUANT: CPT | Performed by: PHYSICIAN ASSISTANT

## 2022-05-24 RX ORDER — ACETAMINOPHEN 500 MG
500 TABLET ORAL EVERY 6 HOURS PRN
COMMUNITY

## 2022-05-24 NOTE — PROGRESS NOTES
Transitional Care Follow Up Visit  Subjective   Chief Complaint   Patient presents with   • Hypertension   • Heart Problem     Myocardial Infarct    [facility] 5/20/2022 to 5/21/2022     Jasiel Ellis is a 76 y.o. male who presents for a transitional care management visit.    Within 48 business hours after discharge our office contacted him via telephone to coordinate his care and needs.      I reviewed and discussed the details of that call along with the discharge summary, hospital problems, inpatient lab results, inpatient diagnostic studies, and consultation reports with Jasiel.     Current outpatient and discharge medications have been reconciled for the patient.  Reviewed by: Salome Knott MD      Date of TCM Phone Call 5/21/2022   [facility]   Date of Admission 5/20/2022   Date of Discharge 5/21/2022   Discharge Disposition Home or Self Care     Risk for Readmission (LACE) Score: 1 (5/21/2022  6:01 AM)      History of Present Illness     Course During Hospital Stay: The patient is here for a hospital follow-up.  He was admitted to [facility] on 5/20/2022 and discharged on 5/21/2022.  Records have been received and reviewed.  Admitting diagnosis was Chest Pain/Unstable Angina and uncontrolled Hypertension.  The patient presented to [facility] ED with substernal chest pain.  He had a mildly elevated troponin and ruled in for a myocardial infarction.  He was initially started on low-dose heparin and his Losartan Crestor were continued.  Cardiology was consulted.  The first 2 Troponins were negative and third troponin was slightly elevated.  EKG showed no dynamic ischemic changes. Cardiac catheterization on 5/20/2022 showed severe disease of the right posterior lateral  branch of the right coronary artery (90% stenosed, treated with a drug-eluting stent).  The patient was discharged home on new medications Aspirin, Plavix, and Metoprolol.  His Crestor and Losartan were  continued.    Labs: Serial Troponins were < 0.010, 0.026, and 0.061.  Initial CBC and CMP were normal with the exception of an elevated non-fasting glucose (122).  BNP and Lipase were normal.  On 5/20/2022, LDL was 67, TGs 85, and Hemoglobin A1c 5.5.  On 5/21/2022, CBC and BMP were normal, with the exception of a slightly low Sodium (134).  LDL was 52 and .    Imaging: On 5/20/2022,  Chest x-ray showed bilateral interstitial prominence,  which was nonspecific,  and a mildly elevated left hemidiaphragm.  On 5/24/2022, Chest x-ray showed no acute disease.    Cardiology: On 5/20/2022, 2D echocardiogram was normal, EF 60%.  On 5/20/2022, cardiac catheterization  showed 90% stenosis of the posterior lateral artery off the RCA.  There was diffuse 40 to 50% plaque in the mid RCA.  There was also 30% plaque in the LAD and some luminal irregularities in the left circumflex.  Left main was normal.    Since discharge, the patient denies chest pain, shortness of breath, AQUINO, orthopnea, palpitations, syncope, lightheadedness, and dizziness.  There is no fever or chills.  He denies GI symptoms.  He is scheduled for cardiac rehab and has a follow-up with Dr. Ugarte in 1 month.    Blood pressure at home has been 110-134 / 53-70 (home blood pressure log reviewed).    The patient was also seen at [facility] ED on 5/24/2022 for acute right leg pain.  CBC was significant for an elevated white blood cell count (11.22).  CMP was normal with exception of an elevated non-fasting Glucose (123) and a slightly low Sodium (135).  D-dimer was normal (0.49).       The following portions of the patient's history were reviewed and updated as appropriate: allergies, current medications, past family history, past medical history, past social history, past surgical history and problem list.    Review of Systems   Constitutional: Negative for fatigue and unexpected weight change.   Eyes: Negative for visual disturbance.   Respiratory:  Negative for cough, shortness of breath and wheezing.    Cardiovascular: Negative for chest pain, palpitations and leg swelling.        No AQUINO, orthopnea, or claudication.   Gastrointestinal: Negative for abdominal pain, blood in stool, constipation, diarrhea, nausea and vomiting.        Denies melena.   Endocrine: Negative for polydipsia and polyuria.   Musculoskeletal: Negative for arthralgias and myalgias.   Neurological: Negative for dizziness, syncope, light-headedness and headaches.        No memory issues.   Psychiatric/Behavioral: Negative for decreased concentration.       Objective   Physical Exam  Vitals and nursing note reviewed.   Constitutional:       Appearance: He is well-developed.      Comments: Overweight.   Neck:      Thyroid: No thyroid mass or thyromegaly.      Vascular: No carotid bruit.   Cardiovascular:      Rate and Rhythm: Normal rate and regular rhythm.      Pulses: Normal pulses.      Heart sounds: Normal heart sounds. No murmur heard.    No friction rub. No gallop.   Pulmonary:      Effort: Pulmonary effort is normal.      Breath sounds: Normal breath sounds.   Musculoskeletal:      Right lower leg: No edema.      Left lower leg: No edema.   Neurological:      Mental Status: He is alert.   Psychiatric:         Mood and Affect: Mood normal.         Assessment & Plan   Diagnoses and all orders for this visit:    1. Coronary artery disease involving native coronary artery of native heart without angina pectoris (Primary)  -     XR Chest PA & Lateral   Continue current medication(s) as noted in the history of present illness.   Follow up with Cardiology.   Patient did not feel the need for Cardiac Rehab, discussed importance and he agrees to schedule.    2. Primary hypertension  -     XR Chest PA & Lateral   Continue current medication(s) as noted in the history of present illness.    3. Leukocytosis, unspecified type  -     XR Chest PA & Lateral   Will need repeat CBC next visit.       The  patient declined a COVID-19 booster today.      Transitional Care Management Certification  I certify that the following are true:  1. Communication was made within 2 business days of discharge.  2. Complexity of Medical Decision Making is moderate.  3. Face to face visit occurred within 3 days.    *Note: 29890 is for high complexity patients with a face to face visit within 7 days of discharge.  27439 is for high complexity patients with a face to face on days 8-14 post discharge or medium complexity with face to face visit within 14 days post discharge.      Return in about 1 month (around 6/24/2022) for Recheck HTN, non-fasting.

## 2022-05-26 ENCOUNTER — DOCUMENTATION (OUTPATIENT)
Dept: CARDIAC REHAB | Facility: HOSPITAL | Age: 77
End: 2022-05-26

## 2022-05-26 LAB
QT INTERVAL: 386 MS
QTC INTERVAL: 410 MS

## 2022-05-26 NOTE — PROGRESS NOTES
Pt. Referred for Phase II Cardiac Rehab. Staff discussed benefits of exercise, and program protocol. Patient has declined a this time. Home exercise guideline education completed. Teach back verified.

## 2022-05-30 LAB
QT INTERVAL: 396 MS
QTC INTERVAL: 424 MS

## 2022-05-31 PROBLEM — I25.10 CORONARY ARTERY DISEASE INVOLVING NATIVE CORONARY ARTERY OF NATIVE HEART: Status: ACTIVE | Noted: 2022-05-20

## 2022-06-01 ENCOUNTER — READMISSION MANAGEMENT (OUTPATIENT)
Dept: CALL CENTER | Facility: HOSPITAL | Age: 77
End: 2022-06-01

## 2022-06-01 NOTE — OUTREACH NOTE
AMI Week 2 Survey    Flowsheet Row Responses   [facility] facility patient discharged from? [place]   Does the patient have one of the following disease processes/diagnoses(primary or secondary)? Acute MI (STEMI,NSTEMI)   Week 2 attempt successful? No   Unsuccessful attempts Attempt 1          YOAN DELEON - Licensed Nurse

## 2022-06-02 LAB
QT INTERVAL: 400 MS
QTC INTERVAL: 422 MS

## 2022-06-07 ENCOUNTER — READMISSION MANAGEMENT (OUTPATIENT)
Dept: CALL CENTER | Facility: HOSPITAL | Age: 77
End: 2022-06-07

## 2022-06-07 NOTE — OUTREACH NOTE
AMI Week 2 Survey    Flowsheet Row Responses   [facility] patient discharged from? [place]   Does the patient have one of the following disease processes/diagnoses(primary or secondary)? Acute MI (STEMI,NSTEMI)   Week 2 attempt successful? Yes   Call start time 1246   Call end time 1252   Discharge diagnosis NSTEMI   Is patient permission given to speak with other caregiver? Yes   List who call center can speak with wife   Meds reviewed with patient/caregiver? Yes   Is the patient having any side effects they believe may be caused by any medication additions or changes? Yes   Side effects comments  stomach discomfort from Crestor   Does the patient have all prescriptions related to this admission filled (includes statins,anticoagulants,HTN meds,anti-arrhythmia meds) Yes   Is the patient taking all medications as directed (includes completed medication regime)? Yes   Has the patient kept scheduled appointments due by today? Yes   Psychosocial issues? No   Comments Pt c/o muscle spasm between left chest and shoulder. Denies CP/SOA/dizziness. Monitoring Bp 128/60, HR 61. C site- right wrist healed well.    What is the patient's perception of their health status since discharge? Improving   Nursing interventions Nurse provided patient education   Is the patient/caregiver able to teach back signs and symptoms of when to call for help immediately: Sudden chest discomfort, Sudden discomfort in arms, back, neck or jaw, Shortness of breath at any time   Is the patient/caregiver able to teach back lifestyle changes to help prevent MIs Regular exercise as approved by provider   Is the patient/caregiver able to teach back ways to prevent a second heart attack: Take medications, Follow up with MD   Is the patient/caregiver able to teach back the hierarchy of who to call/visit for symptoms/problems? PCP, Specialist, Home health nurse, Urgent Care, ED, 911 Yes   Week 2 call completed? Yes   Revoked No further  contact(revokes)-requires comment   Is the patient interested in additional calls from an ambulatory ?  NOTE:  applies to high risk patients requiring additional follow-up. No   Graduated/Revoked comments galdino HANEY - Registered Nurse

## 2022-06-14 ENCOUNTER — APPOINTMENT (OUTPATIENT)
Dept: GENERAL RADIOLOGY | Facility: HOSPITAL | Age: 77
End: 2022-06-14

## 2022-06-14 ENCOUNTER — HOSPITAL ENCOUNTER (EMERGENCY)
Facility: HOSPITAL | Age: 77
Discharge: HOME | End: 2022-06-14
Attending: EMERGENCY MEDICINE | Admitting: EMERGENCY MEDICINE

## 2022-06-14 VITALS
HEIGHT: 66 IN | TEMPERATURE: 98 F | DIASTOLIC BLOOD PRESSURE: 92 MMHG | BODY MASS INDEX: 28.93 KG/M2 | WEIGHT: 180 LBS | RESPIRATION RATE: 18 BRPM | SYSTOLIC BLOOD PRESSURE: 143 MMHG | HEART RATE: 57 BPM | OXYGEN SATURATION: 97 %

## 2022-06-14 DIAGNOSIS — R07.9 ACUTE CHEST PAIN: Primary | ICD-10-CM

## 2022-06-14 DIAGNOSIS — M25.512 ACUTE PAIN OF LEFT SHOULDER: ICD-10-CM

## 2022-06-14 LAB
ALBUMIN SERPL-MCNC: 4.4 G/DL (ref 3.5–5.2)
ALBUMIN/GLOB SERPL: 1.3 G/DL
ALP SERPL-CCNC: 61 U/L (ref 39–117)
ALT SERPL W P-5'-P-CCNC: 20 U/L (ref 1–41)
ANION GAP SERPL CALCULATED.3IONS-SCNC: 7 MMOL/L (ref 5–15)
AST SERPL-CCNC: 21 U/L (ref 1–40)
BASOPHILS # BLD AUTO: 0.06 10*3/MM3 (ref 0–0.2)
BASOPHILS NFR BLD AUTO: 0.8 % (ref 0–1.5)
BILIRUB SERPL-MCNC: 0.3 MG/DL (ref 0–1.2)
BUN SERPL-MCNC: 19 MG/DL (ref 8–23)
BUN/CREAT SERPL: 22.6 (ref 7–25)
CALCIUM SPEC-SCNC: 10 MG/DL (ref 8.6–10.5)
CHLORIDE SERPL-SCNC: 103 MMOL/L (ref 98–107)
CO2 SERPL-SCNC: 29 MMOL/L (ref 22–29)
CREAT SERPL-MCNC: 0.84 MG/DL (ref 0.76–1.27)
DEPRECATED RDW RBC AUTO: 40.9 FL (ref 37–54)
EGFRCR SERPLBLD CKD-EPI 2021: 91.5 ML/MIN/1.73
EOSINOPHIL # BLD AUTO: 0.23 10*3/MM3 (ref 0–0.4)
EOSINOPHIL NFR BLD AUTO: 2.9 % (ref 0.3–6.2)
ERYTHROCYTE [DISTWIDTH] IN BLOOD BY AUTOMATED COUNT: 13.2 % (ref 12.3–15.4)
GLOBULIN UR ELPH-MCNC: 3.3 GM/DL
GLUCOSE SERPL-MCNC: 122 MG/DL (ref 65–99)
HCT VFR BLD AUTO: 40.9 % (ref 37.5–51)
HGB BLD-MCNC: 13.7 G/DL (ref 13–17.7)
HOLD SPECIMEN: NORMAL
IMM GRANULOCYTES # BLD AUTO: 0.02 10*3/MM3 (ref 0–0.05)
IMM GRANULOCYTES NFR BLD AUTO: 0.3 % (ref 0–0.5)
LIPASE SERPL-CCNC: 36 U/L (ref 13–60)
LYMPHOCYTES # BLD AUTO: 2.31 10*3/MM3 (ref 0.7–3.1)
LYMPHOCYTES NFR BLD AUTO: 29.2 % (ref 19.6–45.3)
MCH RBC QN AUTO: 28.3 PG (ref 26.6–33)
MCHC RBC AUTO-ENTMCNC: 33.5 G/DL (ref 31.5–35.7)
MCV RBC AUTO: 84.5 FL (ref 79–97)
MONOCYTES # BLD AUTO: 0.75 10*3/MM3 (ref 0.1–0.9)
MONOCYTES NFR BLD AUTO: 9.5 % (ref 5–12)
NEUTROPHILS NFR BLD AUTO: 4.54 10*3/MM3 (ref 1.7–7)
NEUTROPHILS NFR BLD AUTO: 57.3 % (ref 42.7–76)
NRBC BLD AUTO-RTO: 0 /100 WBC (ref 0–0.2)
NT-PROBNP SERPL-MCNC: 171.7 PG/ML (ref 0–900)
PLATELET # BLD AUTO: 201 10*3/MM3 (ref 140–450)
PMV BLD AUTO: 10.4 FL (ref 6–12)
POTASSIUM SERPL-SCNC: 4.1 MMOL/L (ref 3.5–5.2)
PROT SERPL-MCNC: 7.7 G/DL (ref 6–8.5)
RBC # BLD AUTO: 4.84 10*6/MM3 (ref 4.14–5.8)
SODIUM SERPL-SCNC: 139 MMOL/L (ref 136–145)
TROPONIN T SERPL-MCNC: <0.01 NG/ML (ref 0–0.03)
TROPONIN T SERPL-MCNC: <0.01 NG/ML (ref 0–0.03)
WBC NRBC COR # BLD: 7.91 10*3/MM3 (ref 3.4–10.8)
WHOLE BLOOD HOLD COAG: NORMAL
WHOLE BLOOD HOLD SPECIMEN: NORMAL

## 2022-06-14 PROCEDURE — 85025 COMPLETE CBC W/AUTO DIFF WBC: CPT | Performed by: EMERGENCY MEDICINE

## 2022-06-14 PROCEDURE — 83690 ASSAY OF LIPASE: CPT | Performed by: EMERGENCY MEDICINE

## 2022-06-14 PROCEDURE — 71045 X-RAY EXAM CHEST 1 VIEW: CPT

## 2022-06-14 PROCEDURE — 36415 COLL VENOUS BLD VENIPUNCTURE: CPT

## 2022-06-14 PROCEDURE — 80053 COMPREHEN METABOLIC PANEL: CPT | Performed by: EMERGENCY MEDICINE

## 2022-06-14 PROCEDURE — 99284 EMERGENCY DEPT VISIT MOD MDM: CPT

## 2022-06-14 PROCEDURE — 93005 ELECTROCARDIOGRAM TRACING: CPT | Performed by: EMERGENCY MEDICINE

## 2022-06-14 PROCEDURE — 84484 ASSAY OF TROPONIN QUANT: CPT | Performed by: EMERGENCY MEDICINE

## 2022-06-14 PROCEDURE — 93005 ELECTROCARDIOGRAM TRACING: CPT | Performed by: NURSE PRACTITIONER

## 2022-06-14 PROCEDURE — 83880 ASSAY OF NATRIURETIC PEPTIDE: CPT | Performed by: EMERGENCY MEDICINE

## 2022-06-14 RX ORDER — SODIUM CHLORIDE 0.9 % (FLUSH) 0.9 %
10 SYRINGE (ML) INJECTION AS NEEDED
Status: DISCONTINUED | OUTPATIENT
Start: 2022-06-14 | End: 2022-06-14 | Stop reason: HOSPADM

## 2022-06-14 RX ORDER — ASPIRIN 81 MG/1
324 TABLET, CHEWABLE ORAL ONCE
Status: DISCONTINUED | OUTPATIENT
Start: 2022-06-14 | End: 2022-06-14 | Stop reason: HOSPADM

## 2022-06-16 ENCOUNTER — APPOINTMENT (OUTPATIENT)
Dept: CARDIOLOGY | Facility: HOSPITAL | Age: 77
End: 2022-06-16

## 2022-06-28 ENCOUNTER — OFFICE VISIT (OUTPATIENT)
Dept: INTERNAL MEDICINE | Facility: CLINIC | Age: 77
End: 2022-06-28

## 2022-06-28 ENCOUNTER — LAB (OUTPATIENT)
Dept: LAB | Facility: HOSPITAL | Age: 77
End: 2022-06-28

## 2022-06-28 VITALS
SYSTOLIC BLOOD PRESSURE: 130 MMHG | TEMPERATURE: 98.2 F | RESPIRATION RATE: 20 BRPM | DIASTOLIC BLOOD PRESSURE: 72 MMHG | BODY MASS INDEX: 29.72 KG/M2 | WEIGHT: 184.13 LBS | HEART RATE: 60 BPM

## 2022-06-28 DIAGNOSIS — E78.5 HYPERLIPIDEMIA LDL GOAL <70: ICD-10-CM

## 2022-06-28 DIAGNOSIS — M79.642 BILATERAL HAND PAIN: ICD-10-CM

## 2022-06-28 DIAGNOSIS — K63.5 BENIGN COLON POLYP: ICD-10-CM

## 2022-06-28 DIAGNOSIS — E04.1 SOLITARY NODULE OF RIGHT LOBE OF THYROID: ICD-10-CM

## 2022-06-28 DIAGNOSIS — G25.81 RESTLESS LEGS SYNDROME (RLS): ICD-10-CM

## 2022-06-28 DIAGNOSIS — M79.641 BILATERAL HAND PAIN: ICD-10-CM

## 2022-06-28 DIAGNOSIS — I25.10 CORONARY ARTERY DISEASE INVOLVING NATIVE CORONARY ARTERY OF NATIVE HEART WITHOUT ANGINA PECTORIS: ICD-10-CM

## 2022-06-28 DIAGNOSIS — N40.0 BENIGN PROSTATIC HYPERPLASIA, UNSPECIFIED WHETHER LOWER URINARY TRACT SYMPTOMS PRESENT: ICD-10-CM

## 2022-06-28 DIAGNOSIS — I10 ESSENTIAL HYPERTENSION: Primary | ICD-10-CM

## 2022-06-28 LAB
CHOLEST SERPL-MCNC: 120 MG/DL (ref 0–200)
CHROMATIN AB SERPL-ACNC: 12.6 IU/ML (ref 0–14)
CRP SERPL-MCNC: 0.35 MG/DL (ref 0–0.5)
HDLC SERPL-MCNC: 43 MG/DL (ref 40–60)
LDLC SERPL CALC-MCNC: 53 MG/DL (ref 0–100)
LDLC/HDLC SERPL: 1.17 {RATIO}
TRIGL SERPL-MCNC: 134 MG/DL (ref 0–150)
URATE SERPL-MCNC: 5.9 MG/DL (ref 3.4–7)
VLDLC SERPL-MCNC: 24 MG/DL (ref 5–40)

## 2022-06-28 PROCEDURE — 80061 LIPID PANEL: CPT | Performed by: INTERNAL MEDICINE

## 2022-06-28 PROCEDURE — 86140 C-REACTIVE PROTEIN: CPT | Performed by: INTERNAL MEDICINE

## 2022-06-28 PROCEDURE — 84550 ASSAY OF BLOOD/URIC ACID: CPT | Performed by: INTERNAL MEDICINE

## 2022-06-28 PROCEDURE — 86431 RHEUMATOID FACTOR QUANT: CPT | Performed by: INTERNAL MEDICINE

## 2022-06-28 PROCEDURE — 99214 OFFICE O/P EST MOD 30 MIN: CPT | Performed by: INTERNAL MEDICINE

## 2022-06-29 LAB
QT INTERVAL: 392 MS
QT INTERVAL: 418 MS
QTC INTERVAL: 420 MS
QTC INTERVAL: 424 MS

## 2022-07-09 ENCOUNTER — TELEPHONE (OUTPATIENT)
Dept: INTERNAL MEDICINE | Facility: CLINIC | Age: 77
End: 2022-07-09

## 2022-07-09 NOTE — TELEPHONE ENCOUNTER
Call patient please.    1.  Since his labs were normal, I would recommend a cervical spine MRI to further evaluate his upper extremity pain, weakness, and tingling.  If he is agreeable, I will enter an order.    2.  While reviewing his chart, I noticed he was due for his Colonoscopy with Dr. Mcclure in March 2022.  Has he had that done or been contacted from their office to schedule it?

## 2022-07-10 NOTE — PROGRESS NOTES
[facility] Cardiology  Subjective:     Encounter Date: 2022      Patient ID: Jasiel Ellis is a 76 y.o. male.    Chief Complaint: Hypertension      PROBLEM LIST:  1. Coronary artery disease involving native coronary artery of native heart:  a. AAA US, 2019: Patent nonaneurysmal abdominal aorta.   b. Echo, 2022: EF 60%.   c. Cardiac catheterization, 2022: 90% right posterolateral artery stenosis treated 2.5 x 12 Xience TABITHA. Chronically occluded first diagonal with 3-4+ collaterals. 40-50% mid RCA with normal IFR. 30% proximal LAD. LVEF 50%.  2. NSTEMI.  3. Essential hypertension.  4. Hyperlipidemia LDL goal <70.  5. Former smoker.       History of Present Illness  Jasiel Ellis returns today for a 1 month hospital follow up with a history of coronary artery disease and cardiac risk factors. Since last visit, patient presented the ER with chest pain and left shoulder pain onset several weeks ago after heart catheterization. No radiographic findings of acute cardiopulmonary abnormality. In visit, patient is anxious because for the past 3 or 4 years, nothing was found to be wrong with his heart by his doctors until he woke up with a heavy pressure in his chest and needed a stent placed. His grandfather  of a heart attack at 50 y/o and his brothers both have cardiac risk factors. He has had pain in his LUQ. Additionally, he has been experiencing muscle aches on Crestor in which he has not been able to move his hands, arms, or shoulders. Therefore, he has not been taking it the past couple days. Since stopping the medication, he has still had muscle aches and he cannot tighten his fist on his right hand. Patient denies chest discomfort upon exertion. He also has not had problems with his neck and back. Patient had a uric acid test done which showed normal levels. He also maintains a strict heart healthy diet to manage his coronary artery disease and cardiac risk  "factors. When monitoring his blood pressure at home, it is about 145 mmHg in the morning before medication, and about 105 mmHg at night after medication. The low blood pressure makes him feel sluggish. Patient denies chest pain, shortness of breath, orthopnea, palpitations, edema, dizziness, and syncope.       Allergies   Allergen Reactions   • Atorvastatin Myalgia         Current Outpatient Medications:   •  acetaminophen (TYLENOL) 500 MG tablet, Take 500 mg by mouth Every 6 (Six) Hours As Needed for Mild Pain ., Disp: , Rfl:   •  aspirin 81 MG EC tablet, Take 1 tablet by mouth Daily., Disp: 90 tablet, Rfl: 1  •  carbidopa-levodopa (SINEMET)  MG per tablet, Take 1 tablet by mouth 3 (Three) Times a Day., Disp: , Rfl:   •  clopidogrel (PLAVIX) 75 MG tablet, Take 1 tablet by mouth Daily., Disp: 90 tablet, Rfl: 1  •  gabapentin (NEURONTIN) 300 MG capsule, Take 1 capsule by mouth 3 (Three) Times a Day., Disp: , Rfl:   •  losartan (COZAAR) 50 MG tablet, TAKE 1 TABLET BY MOUTH ONCE DAILY AT NIGHT, Disp: 90 tablet, Rfl: 1  •  metoprolol tartrate (LOPRESSOR) 25 MG tablet, Take 1 tablet by mouth Every 12 (Twelve) Hours., Disp: 180 tablet, Rfl: 1  •  rosuvastatin (CRESTOR) 20 MG tablet, Take 1 tablet by mouth once daily (Patient taking differently: Take 20 mg by mouth Every Night.), Disp: 90 tablet, Rfl: 1    The following portions of the patient's history were reviewed and updated as appropriate: allergies, current medications, past family history, past medical history, past social history, past surgical history and problem list.    ROS       Objective:     Vitals:    07/11/22 0903   BP: 140/68   BP Location: Left arm   Patient Position: Sitting   Pulse: 64   SpO2: 99%   Weight: 83.5 kg (184 lb)   Height: 167.6 cm (66\")         Physical Exam    Lab Review:  Lab Results   Component Value Date    GLUCOSE 122 (H) 06/14/2022    BUN 19 06/14/2022    CREATININE 0.84 06/14/2022    EGFRIFNONA 85 01/27/2022    BCR 22.6 " "06/14/2022    K 4.1 06/14/2022    CO2 29.0 06/14/2022    CALCIUM 10.0 06/14/2022    ALBUMIN 4.40 06/14/2022    ALKPHOS 61 06/14/2022    AST 21 06/14/2022    ALT 20 06/14/2022     Lab Results   Component Value Date    CHOL 120 06/28/2022    TRIG 134 06/28/2022    HDL 43 06/28/2022    LDL 53 06/28/2022      Lab Results   Component Value Date    WBC 7.91 06/14/2022    RBC 4.84 06/14/2022    HGB 13.7 06/14/2022    HCT 40.9 06/14/2022    MCV 84.5 06/14/2022     06/14/2022     Lab Results   Component Value Date    TSH 3.530 01/27/2022     Lab Results   Component Value Date    HGBA1C 5.50 05/20/2022        Procedures        Assessment:   Diagnoses and all orders for this visit:    1. Coronary artery disease involving native coronary artery of native heart, unspecified whether angina present (Primary)    2. Essential hypertension    3. Hyperlipidemia LDL goal <70        Impression:  1. Coronary artery disease. Stable without angina on current activity. On aspirin and Plavix.   2. Essential hypertension. Well controlled. On losartan and metoprolol.  Possibly symptomatic post morning medicines with hypotension.  3. Hyperlipidemia LDL goal <70. Well controlled, however, rosuvastatin has adverse side effects such as muscle aches. Initiate alternative therapy for a couple of weeks to see if symptoms improve.    Plan:  1. Patient office statin.  Thinks causing significant myalgias.  2. Bempedoic acid 100 mg daily.  3. Continue other current medications however will have patient \"spread out\" his blood pressure medicines he takes his Cozaar at noon..  4. Revisit in 12 MO, or sooner as needed.    Scribed for Stef Ugarte MD by Melodie Alex. 7/11/2022 09:21 EDT    Stef Ugarte MD      Please note that portions of this note may have been completed with a voice recognition program. Efforts were made to edit the dictations, but occasionally words are mistranscribed.    "

## 2022-07-11 ENCOUNTER — OFFICE VISIT (OUTPATIENT)
Dept: CARDIOLOGY | Facility: CLINIC | Age: 77
End: 2022-07-11

## 2022-07-11 VITALS
HEART RATE: 64 BPM | SYSTOLIC BLOOD PRESSURE: 140 MMHG | OXYGEN SATURATION: 99 % | WEIGHT: 184 LBS | HEIGHT: 66 IN | DIASTOLIC BLOOD PRESSURE: 68 MMHG | BODY MASS INDEX: 29.57 KG/M2

## 2022-07-11 DIAGNOSIS — I25.10 CORONARY ARTERY DISEASE INVOLVING NATIVE CORONARY ARTERY OF NATIVE HEART, UNSPECIFIED WHETHER ANGINA PRESENT: Primary | ICD-10-CM

## 2022-07-11 DIAGNOSIS — E78.5 HYPERLIPIDEMIA LDL GOAL <70: ICD-10-CM

## 2022-07-11 DIAGNOSIS — I10 ESSENTIAL HYPERTENSION: ICD-10-CM

## 2022-07-11 PROCEDURE — 99214 OFFICE O/P EST MOD 30 MIN: CPT | Performed by: INTERNAL MEDICINE

## 2022-07-11 NOTE — TELEPHONE ENCOUNTER
Spoke to Jasiel he states he saw Dr Andrew and he thinks the symptoms he is having is due to the Lipitor.  Dr Andrew  Told  him to stop the Lipitor for 2 weeks and and he will send a different cholesterol medication to pharmacy that he can start in 2-3 weeks.      Jasiel states he knows he is due for a Coloscopy and he wants to wait for a month due to his heart issues he is having and he will call and schedule .  He states he will call and let Dr Knott know when the appointment is schduled

## 2022-07-13 RX ORDER — ATORVASTATIN CALCIUM 10 MG/1
10 TABLET, FILM COATED ORAL DAILY
Qty: 30 TABLET | Refills: 11 | Status: SHIPPED | OUTPATIENT
Start: 2022-07-13

## 2022-08-22 ENCOUNTER — APPOINTMENT (OUTPATIENT)
Dept: GENERAL RADIOLOGY | Facility: HOSPITAL | Age: 77
End: 2022-08-22

## 2022-08-22 ENCOUNTER — HOSPITAL ENCOUNTER (EMERGENCY)
Facility: HOSPITAL | Age: 77
Discharge: HOME | End: 2022-08-22
Attending: EMERGENCY MEDICINE | Admitting: EMERGENCY MEDICINE

## 2022-08-22 VITALS
HEART RATE: 70 BPM | WEIGHT: 180 LBS | OXYGEN SATURATION: 96 % | DIASTOLIC BLOOD PRESSURE: 75 MMHG | BODY MASS INDEX: 28.93 KG/M2 | HEIGHT: 66 IN | TEMPERATURE: 97.6 F | SYSTOLIC BLOOD PRESSURE: 121 MMHG | RESPIRATION RATE: 18 BRPM

## 2022-08-22 DIAGNOSIS — R07.89 ATYPICAL CHEST PAIN: Primary | ICD-10-CM

## 2022-08-22 LAB
ALBUMIN SERPL-MCNC: 4.1 G/DL (ref 3.5–5.2)
ALBUMIN/GLOB SERPL: 1.3 G/DL
ALP SERPL-CCNC: 60 U/L (ref 39–117)
ALT SERPL W P-5'-P-CCNC: 12 U/L (ref 1–41)
ANION GAP SERPL CALCULATED.3IONS-SCNC: 8 MMOL/L (ref 5–15)
AST SERPL-CCNC: 18 U/L (ref 1–40)
BASOPHILS # BLD AUTO: 0.05 10*3/MM3 (ref 0–0.2)
BASOPHILS NFR BLD AUTO: 0.6 % (ref 0–1.5)
BILIRUB SERPL-MCNC: 0.5 MG/DL (ref 0–1.2)
BUN SERPL-MCNC: 19 MG/DL (ref 8–23)
BUN/CREAT SERPL: 21.6 (ref 7–25)
CALCIUM SPEC-SCNC: 9.5 MG/DL (ref 8.6–10.5)
CHLORIDE SERPL-SCNC: 105 MMOL/L (ref 98–107)
CO2 SERPL-SCNC: 27 MMOL/L (ref 22–29)
CREAT SERPL-MCNC: 0.88 MG/DL (ref 0.76–1.27)
DEPRECATED RDW RBC AUTO: 39.7 FL (ref 37–54)
EGFRCR SERPLBLD CKD-EPI 2021: 90.2 ML/MIN/1.73
EOSINOPHIL # BLD AUTO: 0.18 10*3/MM3 (ref 0–0.4)
EOSINOPHIL NFR BLD AUTO: 2.1 % (ref 0.3–6.2)
ERYTHROCYTE [DISTWIDTH] IN BLOOD BY AUTOMATED COUNT: 13.4 % (ref 12.3–15.4)
GLOBULIN UR ELPH-MCNC: 3.1 GM/DL
GLUCOSE SERPL-MCNC: 117 MG/DL (ref 65–99)
HCT VFR BLD AUTO: 47 % (ref 37.5–51)
HGB BLD-MCNC: 15.9 G/DL (ref 13–17.7)
HOLD SPECIMEN: NORMAL
IMM GRANULOCYTES # BLD AUTO: 0.03 10*3/MM3 (ref 0–0.05)
IMM GRANULOCYTES NFR BLD AUTO: 0.4 % (ref 0–0.5)
LYMPHOCYTES # BLD AUTO: 2.3 10*3/MM3 (ref 0.7–3.1)
LYMPHOCYTES NFR BLD AUTO: 26.9 % (ref 19.6–45.3)
MAGNESIUM SERPL-MCNC: 1.9 MG/DL (ref 1.6–2.4)
MCH RBC QN AUTO: 27.9 PG (ref 26.6–33)
MCHC RBC AUTO-ENTMCNC: 33.8 G/DL (ref 31.5–35.7)
MCV RBC AUTO: 82.5 FL (ref 79–97)
MONOCYTES # BLD AUTO: 0.73 10*3/MM3 (ref 0.1–0.9)
MONOCYTES NFR BLD AUTO: 8.5 % (ref 5–12)
NEUTROPHILS NFR BLD AUTO: 5.26 10*3/MM3 (ref 1.7–7)
NEUTROPHILS NFR BLD AUTO: 61.5 % (ref 42.7–76)
NRBC BLD AUTO-RTO: 0 /100 WBC (ref 0–0.2)
NT-PROBNP SERPL-MCNC: 317.1 PG/ML (ref 0–900)
PLATELET # BLD AUTO: 243 10*3/MM3 (ref 140–450)
PMV BLD AUTO: 10.5 FL (ref 6–12)
POTASSIUM SERPL-SCNC: 4.4 MMOL/L (ref 3.5–5.2)
PROT SERPL-MCNC: 7.2 G/DL (ref 6–8.5)
QT INTERVAL: 368 MS
QT INTERVAL: 398 MS
QTC INTERVAL: 391 MS
QTC INTERVAL: 400 MS
RBC # BLD AUTO: 5.7 10*6/MM3 (ref 4.14–5.8)
SODIUM SERPL-SCNC: 140 MMOL/L (ref 136–145)
TROPONIN T SERPL-MCNC: <0.01 NG/ML (ref 0–0.03)
TSH SERPL DL<=0.05 MIU/L-ACNC: 4.18 UIU/ML (ref 0.27–4.2)
WBC NRBC COR # BLD: 8.55 10*3/MM3 (ref 3.4–10.8)
WHOLE BLOOD HOLD COAG: NORMAL
WHOLE BLOOD HOLD SPECIMEN: NORMAL

## 2022-08-22 PROCEDURE — 71045 X-RAY EXAM CHEST 1 VIEW: CPT

## 2022-08-22 PROCEDURE — 85025 COMPLETE CBC W/AUTO DIFF WBC: CPT | Performed by: EMERGENCY MEDICINE

## 2022-08-22 PROCEDURE — 83735 ASSAY OF MAGNESIUM: CPT | Performed by: EMERGENCY MEDICINE

## 2022-08-22 PROCEDURE — 84443 ASSAY THYROID STIM HORMONE: CPT | Performed by: EMERGENCY MEDICINE

## 2022-08-22 PROCEDURE — 99284 EMERGENCY DEPT VISIT MOD MDM: CPT | Performed by: INTERNAL MEDICINE

## 2022-08-22 PROCEDURE — 84484 ASSAY OF TROPONIN QUANT: CPT | Performed by: EMERGENCY MEDICINE

## 2022-08-22 PROCEDURE — 80053 COMPREHEN METABOLIC PANEL: CPT | Performed by: EMERGENCY MEDICINE

## 2022-08-22 PROCEDURE — 99284 EMERGENCY DEPT VISIT MOD MDM: CPT

## 2022-08-22 PROCEDURE — 93005 ELECTROCARDIOGRAM TRACING: CPT | Performed by: EMERGENCY MEDICINE

## 2022-08-22 PROCEDURE — 83880 ASSAY OF NATRIURETIC PEPTIDE: CPT | Performed by: EMERGENCY MEDICINE

## 2022-08-22 RX ORDER — SODIUM CHLORIDE 0.9 % (FLUSH) 0.9 %
10 SYRINGE (ML) INJECTION AS NEEDED
Status: DISCONTINUED | OUTPATIENT
Start: 2022-08-22 | End: 2022-08-22 | Stop reason: HOSPADM

## 2022-08-22 RX ADMIN — SODIUM CHLORIDE 500 ML: 9 INJECTION, SOLUTION INTRAVENOUS at 08:57

## 2022-08-22 NOTE — ED PROVIDER NOTES
EMERGENCY DEPARTMENT ENCOUNTER    Pt Name: Jasiel Ellis  MRN: [id number]  Pt :   1945  Room Number:    Date of encounter:  2022  PCP: Salome Knott MD  ED Provider: JOSE ANTONIO Anders    Historian:  Patient and wife       HPI:  Chief Complaint: elevated heart rate          Context: Jasiel Ellis is a 76 y.o. male who presents to the ED c/o awakening at 5 AM suddenly feeling anxious with a burning sensation in his chest.  Patient states his heart rate was found to be 150 260.  His blood pressure was 145 systolic.  Patient states he walked [place] yesterday.  He went to bed 11 PM feeling normal.  Patient states he recently stopped taking his statin.  He is experiencing no nausea or shortness of breath or faintness.  He adds that he had a stent placed by Dr. Ugarte approximately 3 months ago.    Review of systems is negative for fever chills or recent illness.  Negative for current chest pain or shortness of breath.  No cough.  GI and  systems are negative.  No profound weakness, dizziness or syncope.  No neurosensory complaints or focal weakness      PAST MEDICAL HISTORY  Past Medical History:   Diagnosis Date   • Benign colon polyp     Dx  (rectal polyp ? path).   • Bilateral hearing loss     Since approx  (L>R).   • BPH (benign prostatic hyperplasia)     Sees Dr. Francisco   • Chronic pain of both knees     R>L.  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   • Coronary artery disease involving native coronary artery of native heart     dx 2022- Cardiac catheterization for NSTEMI (2022): Severe 1-vessel CAD (RPL) status post PCI with TABITHA. Echo (2020): LVEF 60%.  Normal functioning valves   • Essential hypertension     Dx approx .   • History of cardiac catheterization 2022    LVEF 50%, 90% RCA right posterolateral artery stenosis treated 2.5 x 12 Xience TABITHA, 40-50% mid RCA with normal IFR, 30% proximal LAD, Chronically  occluded first diagonal with 3-4+ collaterals   • History of echocardiogram 2018    EF 60-65%, trace MR/TR, mildly increased LV wall thickness.   • History of echocardiogram 2022    Normal.  EF 60 %   • History of varicella    • Hyperlipidemia     Dx  ().   • NSTEMI (non-ST elevated myocardial infarction) (HCC) 2022    Cardiac catheterization for NSTEMI (): Severe 1-vessel CAD (RPL) status post PCI with TABITHA. Echo (2020): LVEF 60%.  Normal functioning valves   • Restless legs syndrome (RLS)     Saw Jameel PERRY).   • Solitary nodule of right lobe of thyroid     Dx  (approx) per Dr. Blank.  Patient states ultrasound showed a Calcium deposit.         PAST SURGICAL HISTORY  Past Surgical History:   Procedure Laterality Date   • BLADDER STONE REMOVAL OPEN      [place]olan   • CARDIAC CATHETERIZATION N/A 2022    Procedure: Left Heart Cath;  Surgeon: Stef Ugarte MD;  Location: [facility] CATH INVASIVE LOCATION;  Service: Cardiovascular;  Laterality: N/A;   • INGUINAL HERNIA REPAIR Right     mesh placed (approx date)   • LASER OF PROSTATE W/ GREEN LIGHT PVP      Nolan         FAMILY HISTORY  Family History   Problem Relation Age of Onset   • No Known Problems Mother    • No Known Problems Father    • Colon cancer Brother         dx 40's, stage 4   • Diabetes Brother    • Lung cancer Brother         dx 60's mx to bone   • Diabetes Brother          SOCIAL HISTORY  Social History     Socioeconomic History   • Marital status:    Tobacco Use   • Smoking status: Former Smoker     Packs/day: 1.00     Years: 50.00     Pack years: 50.00     Types: Cigarettes     Start date:      Quit date: 2015     Years since quittin.3   • Smokeless tobacco: Never Used   Vaping Use   • Vaping Use: Never used   Substance and Sexual Activity   • Alcohol use: Yes     Comment: 2 beers per week   • Drug use: No   • Sexual activity: Yes     Partners: Female          ALLERGIES  Crestor [rosuvastatin]        REVIEW OF SYSTEMS  Review of Systems     All systems reviewed and negative except for those discussed in HPI.       PHYSICAL EXAM    I have reviewed the triage vital signs and nursing notes.    ED Triage Vitals [08/22/22 0832]   Temp Heart Rate Resp BP SpO2   97.6 °F (36.4 °C) (!) 152 18 92/82 96 %      Temp src Heart Rate Source Patient Position BP Location FiO2 (%)   Oral Monitor Sitting Left arm --       Physical Exam  GENERAL:   Appears in no acute distress.  After triage, patient's vital signs were found to be normal with a sinus rhythm.  He is a very good historian  HENT: Nares patent.  EYES: No scleral icterus.  CV: Regular rhythm, regular rate.  No peripheral edema.  No tachycardia.  No murmur.  RESPIRATORY: Normal effort.  No audible wheezes, rales or rhonchi.  ABDOMEN: Soft, nontender  MUSCULOSKELETAL: No deformities.   NEURO: Alert, moves all extremities, follows commands.  SKIN: Warm, dry, no rash visualized.        LAB RESULTS  No results found for this or any previous visit (from the past 24 hour(s)).    If labs were ordered, I independently reviewed the results.        RADIOLOGY  No Radiology Exams Resulted Within Past 24 Hours    PROCEDURES    Procedures    ECG 12 Lead   Final Result   Test Reason : rhythm change   Blood Pressure :   */*   mmHG   Vent. Rate :  61 BPM     Atrial Rate :  61 BPM      P-R Int : 158 ms          QRS Dur :  76 ms       QT Int : 398 ms       P-R-T Axes :  32 -31  30 degrees      QTc Int : 400 ms      Sinus rhythm with marked sinus arrhythmia   Left axis deviation   Minimal voltage criteria for LVH, may be normal variant   Cannot rule out Anterior infarct (cited on or before 22-AUG-2022)   Abnormal ECG   When compared with ECG of 22-AUG-2022 08:39, (Unconfirmed)   premature ventricular complexes are no longer present   Confirmed by OMAR RICHARD MD ([id number]) on 8/22/2022 4:45:07 PM      Referred By: LAWRENCE           Confirmed By:  OMAR RICHARD MD      ECG 12 Lead   Final Result   Test Reason : Dysrhythmia triage protocol   Blood Pressure :   */*   mmHG   Vent. Rate :  68 BPM     Atrial Rate :  68 BPM      P-R Int : 148 ms          QRS Dur :  74 ms       QT Int : 368 ms       P-R-T Axes :  14 -37  39 degrees      QTc Int : 391 ms      ** Poor data quality, interpretation may be adversely affected   Sinus rhythm with occasional premature ventricular complexes   Left axis deviation   Minimal voltage criteria for LVH, may be normal variant   Cannot rule out prior anterior infarct   Abnormal ECG   When compared with ECG of 14-JUN-2022 19:12,   premature ventricular complexes are now present   Minimal criteria for Anterior infarct are now present   Confirmed by OMAR RICHARD MD ([id number]) on 8/22/2022 4:45:51 PM      Referred By: EDMD           Confirmed By: OMAR RICHARD MD          MEDICATIONS GIVEN IN ER    Medications   sodium chloride 0.9 % bolus 500 mL (0 mL Intravenous Stopped 8/22/22 0933)         ED Course as of 08/27/22 0139   Mon Aug 22, 2022   1030 With patient's first set of labs is very reassuring.  His vital signs of been stable since the brief tachycardia measured at triage time.  Cardiology consult has been requested.  He continues to maintain that he is chest pain-free [MS]   1146 Cardiology has assessed the patient and will make arrangements for discharge with a continuous telemetry monitoring and reach out to the patient as they continue to observe him.  Patient has remained chest pain-free throughout his ED course. [MS]   1314 Patient's telemetry monitor is in order and operational.  Patient continues to be chest pain-free with normal vital signs.  We discussed parameters for concern that would warrant return to the emergency department.  Patient and his wife understand and concur with his outpatient plan [MS]      ED Course User Index  [MS] Candis Mcmahon, JOSE ANTONIO             AS OF 01:39 EDT VITALS:    BP - 121/75  HR - 70  TEMP  - 97.6 °F (36.4 °C) (Oral)  O2 SATS - 96%                  DIAGNOSIS  Final diagnoses:   Atypical chest pain         DISPOSITION    DISCHARGE    Patient discharged in stable condition.    Reviewed implications of results, diagnosis, meds, responsibility to follow up, warning signs and symptoms of possible worsening, potential complications and reasons to return to ER.    Patient/Family voiced understanding of above instructions.    Discussed plan for discharge, as there is no emergent indication for admission.  Pt/family is agreeable and understands need for follow up and possible repeat testing.  Pt/family is aware that discharge does not mean that nothing is wrong but that it indicates no emergency is currently present that requires admission and they must continue care with follow-up as given below or with a physician of their choice.     FOLLOW-UP  Salome Knott MD  [address] [facility]  [address]  [facility] [id number] [phone]          Stef Griffith MD  [address]  [address]  [facility] [id number] [phone]    Schedule an appointment as soon as possible for a visit in 6 week(s)  PLEASE CALL DR GRIFFITH'S OFFICE TODAY TO SCHEDULE FOLLOW UP APPOINTMENT IN 6 WEEKS.         Medication List      No changes were made to your prescriptions during this visit.                  Candis Mcmahon, APRN  08/27/22 0139

## 2022-08-22 NOTE — DISCHARGE INSTRUCTIONS
Continue your telemetry monitoring as directed.  Follow-up with Dr. Ugarte as well as your primary care provider.  Return to the emergency department as needed for worsening symptoms or concerns.  Thank you

## 2022-10-31 ENCOUNTER — OFFICE VISIT (OUTPATIENT)
Dept: CARDIOLOGY | Facility: CLINIC | Age: 77
End: 2022-10-31

## 2022-10-31 VITALS
HEIGHT: 66 IN | OXYGEN SATURATION: 97 % | BODY MASS INDEX: 28.77 KG/M2 | SYSTOLIC BLOOD PRESSURE: 134 MMHG | DIASTOLIC BLOOD PRESSURE: 76 MMHG | HEART RATE: 58 BPM | WEIGHT: 179 LBS

## 2022-10-31 DIAGNOSIS — E78.5 HYPERLIPIDEMIA LDL GOAL <70: ICD-10-CM

## 2022-10-31 DIAGNOSIS — I10 ESSENTIAL HYPERTENSION: ICD-10-CM

## 2022-10-31 DIAGNOSIS — I25.10 CORONARY ARTERY DISEASE INVOLVING NATIVE CORONARY ARTERY OF NATIVE HEART, UNSPECIFIED WHETHER ANGINA PRESENT: Primary | ICD-10-CM

## 2022-10-31 PROCEDURE — 99213 OFFICE O/P EST LOW 20 MIN: CPT | Performed by: INTERNAL MEDICINE

## 2022-10-31 NOTE — PROGRESS NOTES
[facility] Cardiology  Subjective:     Encounter Date: 10/31/2022      Patient ID: Jasiel Ellis is a 77 y.o. male.    Chief Complaint: Coronary artery disease involving native coronary artery of      PROBLEM LIST:  1. Coronary artery disease involving native coronary artery of native heart:  a. AAA US, 07/25/2019: Patent nonaneurysmal abdominal aorta.   b. Echo, 05/20/2022: EF 60%.   c. Cardiac catheterization, 05/20/2022: 90% right posterolateral artery stenosis treated 2.5 x 12 Xience TABITHA. Chronically occluded first diagonal with 3-4+ collaterals. 40-50% mid RCA with normal IFR. 30% proximal LAD. LVEF 50%.  d. MCOT, 09/30/2022: No A. fib. PVC iban 8%. PAC burden 2%.  2. NSTEMI.  3. Essential hypertension.  4. Hyperlipidemia LDL goal <70.  5. Former smoker.     History of Present Illness  Jasiel Ellis returns today for a follow up with a history of CAD and cardiac risk factors. Since last visit, patient has been doing well overall from a cardiovascular standpoint. However, on occasion he feels chest pain along his collar bone on the left side. This is not brought on by exertion. Patient is intolerant to statins. His blood pressure in the morning is 145 mmHg systolic and decreases throughout the day. He questions why he had blockages. Patient denies shortness of breath, orthopnea, palpitations, edema, dizziness, and syncope.      Allergies   Allergen Reactions   • Crestor [Rosuvastatin] Myalgia         Current Outpatient Medications:   •  acetaminophen (TYLENOL) 500 MG tablet, Take 500 mg by mouth Every 6 (Six) Hours As Needed for Mild Pain ., Disp: , Rfl:   •  aspirin 81 MG EC tablet, Take 1 tablet by mouth Daily., Disp: 90 tablet, Rfl: 1  •  atorvastatin (LIPITOR) 10 MG tablet, Take 1 tablet by mouth Daily., Disp: 30 tablet, Rfl: 11  •  carbidopa-levodopa (SINEMET)  MG per tablet, Take 1 tablet by mouth Daily., Disp: , Rfl:   •  clopidogrel (PLAVIX) 75 MG tablet, Take 1  "tablet by mouth Daily., Disp: 90 tablet, Rfl: 1  •  gabapentin (NEURONTIN) 300 MG capsule, Take 1 capsule by mouth Daily., Disp: , Rfl:   •  losartan (COZAAR) 50 MG tablet, TAKE 1 TABLET BY MOUTH ONCE DAILY AT NIGHT, Disp: 90 tablet, Rfl: 1  •  metoprolol tartrate (LOPRESSOR) 25 MG tablet, Take 1 tablet by mouth Every 12 (Twelve) Hours., Disp: 180 tablet, Rfl: 1    The following portions of the patient's history were reviewed and updated as appropriate: allergies, current medications, past family history, past medical history, past social history, past surgical history and problem list.    Review of Systems   Constitutional: Negative.   Cardiovascular: Positive for chest pain. Negative for dyspnea on exertion, leg swelling, palpitations and syncope.   Respiratory: Negative.  Negative for shortness of breath.    Hematologic/Lymphatic: Negative for bleeding problem. Does not bruise/bleed easily.   Skin: Negative for rash.   Musculoskeletal: Negative for muscle weakness and myalgias.   Gastrointestinal: Negative for heartburn, nausea and vomiting.   Neurological: Negative for dizziness, light-headedness, loss of balance and numbness.          Objective:     Vitals:    10/31/22 1344   BP: 134/76   BP Location: Left arm   Patient Position: Sitting   Cuff Size: Adult   Pulse: 58   SpO2: 97%   Weight: 81.2 kg (179 lb)   Height: 167.6 cm (66\")         Constitutional:       Appearance: Well-developed.   Neck:      Thyroid: No thyromegaly.      Vascular: No carotid bruit or JVD.   Pulmonary:      Breath sounds: Normal breath sounds.   Cardiovascular:      Regular rhythm.      No gallop. No S3 and S4 gallop.   Abdominal:      General: Bowel sounds are normal.      Palpations: Abdomen is soft. There is no abdominal mass.      Tenderness: There is no abdominal tenderness.   Skin:     General: Skin is warm and dry.      Findings: No rash.   Neurological:      Mental Status: Alert and oriented to person, place, and time. "         Lab Review:  Lab Results   Component Value Date    GLUCOSE 117 (H) 08/22/2022    BUN 19 08/22/2022    CREATININE 0.88 08/22/2022    EGFRIFNONA 85 01/27/2022    BCR 21.6 08/22/2022     08/22/2022    K 4.4 08/22/2022    CO2 27.0 08/22/2022    CALCIUM 9.5 08/22/2022    ALBUMIN 4.10 08/22/2022    ALKPHOS 60 08/22/2022    AST 18 08/22/2022    ALT 12 08/22/2022     Lab Results   Component Value Date    CHOL 120 06/28/2022    TRIG 134 06/28/2022    HDL 43 06/28/2022    LDL 53 06/28/2022      Lab Results   Component Value Date    WBC 8.55 08/22/2022    RBC 5.70 08/22/2022    HGB 15.9 08/22/2022    HCT 47.0 08/22/2022    MCV 82.5 08/22/2022     08/22/2022     Lab Results   Component Value Date    TSH 4.180 08/22/2022     Lab Results   Component Value Date    HGBA1C 5.50 05/20/2022        Procedures        Assessment:   Diagnoses and all orders for this visit:    1. Coronary artery disease involving native coronary artery of native heart, unspecified whether angina present (Primary)    2. Essential hypertension    3. Hyperlipidemia LDL goal <70        Impression:  1. Coronary artery disease. Stable without angina on current activity. Continue aspirin 81 mg and Plavix 75 mg daily for DAPT.   2. Essential hypertension. Well controlled. Continue on losartan 50 mg daily and metoprolol 25 mg BID for hypertension.  3. Hyperlipidemia. Well controlled. LDL 53 on 06/28/2022. Continue on atorvastatin 10 mg daily for hyperlipidemia.    Plan:  1. Stable cardiac status.   2. Continue current medications.  3. Revisit in 12 MO, or sooner as needed.    Scribed for Stef Ugarte MD by Melodie Alex. 10/31/2022 14:27 EDT    Stef Ugarte MD      Please note that portions of this note may have been completed with a voice recognition program. Efforts were made to edit the dictations, but occasionally words are mistranscribed.

## 2022-11-06 DIAGNOSIS — I10 ESSENTIAL HYPERTENSION: ICD-10-CM

## 2022-11-06 RX ORDER — LOSARTAN POTASSIUM 50 MG/1
TABLET ORAL
Qty: 90 TABLET | Refills: 0 | Status: SHIPPED | OUTPATIENT
Start: 2022-11-06 | End: 2023-01-10

## 2022-11-10 DIAGNOSIS — I25.110 CORONARY ARTERY DISEASE INVOLVING NATIVE CORONARY ARTERY OF NATIVE HEART WITH UNSTABLE ANGINA PECTORIS: ICD-10-CM

## 2022-11-10 RX ORDER — CLOPIDOGREL BISULFATE 75 MG/1
75 TABLET ORAL DAILY
Qty: 90 TABLET | Refills: 1 | Status: SHIPPED | OUTPATIENT
Start: 2022-11-10

## 2022-11-10 RX ORDER — CLOPIDOGREL BISULFATE 75 MG/1
75 TABLET ORAL DAILY
Qty: 90 TABLET | Refills: 1 | Status: CANCELLED | OUTPATIENT
Start: 2022-11-10

## 2022-11-10 NOTE — TELEPHONE ENCOUNTER
Caller: Jasiel Ellis    Relationship: Self    Best call back number: [phone]    Requested Prescriptions:   Requested Prescriptions     Pending Prescriptions Disp Refills   • clopidogrel (PLAVIX) 75 MG tablet 90 tablet 1     Sig: Take 1 tablet by mouth Daily.        Pharmacy where request should be sent: [place] [address] [address] [facility] - [phone] [facility] [phone] FX     Additional details provided by patient: OUT OF MEDICATION. PATIENT STATES 3 MONTHS AGO HE WAS SEEN AT THE ED AND WAS PRESCRIBED MEDICATION AND HAS SINCE RAN OUT OF PRESCRIPTION. PATIENT STATES HE TRIED TO REFILL MEDICATION BUT WAS TOLD HE COULD NOT REFILL THE MEDICATION BECAUSE THE MEDICATION WAS FILLED BY A HOSPITALIST.  PATIENT WOULD LIKE TO REQUEST THAT PCP CONTINUE TO FILL MEDICATION.     Does the patient have less than a 3 day supply:  [x] Yes  [] No    Ann Trevino Rep   11/10/22 15:38 EST

## 2022-11-11 NOTE — TELEPHONE ENCOUNTER
Spoke with Jasiel   He states the rx was sent to pharmmacy today   Notified him that Dr Ugarte sent the rx to pharmacy and he can contact his office next time when he needs a refill   Verbal understanding given

## 2022-11-21 DIAGNOSIS — I25.110 CORONARY ARTERY DISEASE INVOLVING NATIVE CORONARY ARTERY OF NATIVE HEART WITH UNSTABLE ANGINA PECTORIS: ICD-10-CM

## 2022-11-22 ENCOUNTER — TELEPHONE (OUTPATIENT)
Dept: INTERNAL MEDICINE | Facility: CLINIC | Age: 77
End: 2022-11-22

## 2022-11-22 DIAGNOSIS — Z80.0 FAMILY HISTORY OF COLON CANCER: ICD-10-CM

## 2022-11-22 DIAGNOSIS — K63.5 BENIGN COLON POLYP: Primary | ICD-10-CM

## 2022-11-22 DIAGNOSIS — Z12.11 SCREENING FOR COLON CANCER: ICD-10-CM

## 2022-11-22 NOTE — TELEPHONE ENCOUNTER
Spoke with Jasiel and notified him the last colonoscopy we have in chart is 9/14/2002     He states he thinks he had one done at [facility] in 2014    He will call medical records  and let us know what he finds out and call back

## 2022-11-22 NOTE — TELEPHONE ENCOUNTER
Ion called back and said [facility] did his colonoscopy 3/28/2017 and was told to have his next one in 4 years.  He states he declined scheduling it last year due to covid .  They will fax the results to DR Knott   He said [facility]  could not get him in for his colonoscopy until 1/17/2023  He would like Dr Knott to refer him for colonoscopy sooner than then .  He does not want to wait until 1/2023  He states message can wait until Dr Knott is back next week

## 2022-11-22 NOTE — TELEPHONE ENCOUNTER
Caller: Jasiel Ellis    Relationship: Self    Best call back number: [phone]    What is the best time to reach you: ANY    Who are you requesting to speak with (clinical staff, provider,  specific staff member): SON    Do you know the name of the person who called: SELF    What was the call regarding: PATIENT HAD QUESTIONS ABOUT HIS COLONOSCOPY HISTORY.    Do you require a callback: YES

## 2022-12-09 ENCOUNTER — TELEPHONE (OUTPATIENT)
Dept: INTERNAL MEDICINE | Facility: CLINIC | Age: 77
End: 2022-12-09

## 2022-12-09 NOTE — TELEPHONE ENCOUNTER
He stated that he has not gone to the dr to test for the flu. He stated that his wife tested positive and he is not worried about himself but does want her to be able to get her cough medicine. Pharmacy would not let pt  due to quantity being wrong.

## 2022-12-09 NOTE — TELEPHONE ENCOUNTER
Caller: Jasiel Ellis    Relationship: Self    Best call back number: [phone]    What is the best time to reach you: ANY TIME    Who are you requesting to speak with (clinical staff, provider,  specific staff member): SON    Do you know the name of the person who called: JASIEL    What was the call regarding: PATIENT WOULD LIKE A CALL BACK FROM SON TO DISCUSS CONCERNS ABOUT CURRENT SYMPTOMS AND ISSUES WITH GETTING PRESCRIPTIONS FOR HIS AND HIS WIFE'S SYMPTOMS THIS WEEK.     Do you require a callback: YES

## 2023-01-04 ENCOUNTER — OFFICE VISIT (OUTPATIENT)
Dept: INTERNAL MEDICINE | Facility: CLINIC | Age: 78
End: 2023-01-04
Payer: MEDICARE

## 2023-01-04 VITALS
TEMPERATURE: 96.9 F | HEIGHT: 66 IN | SYSTOLIC BLOOD PRESSURE: 122 MMHG | BODY MASS INDEX: 28.83 KG/M2 | WEIGHT: 179.4 LBS | HEART RATE: 52 BPM | DIASTOLIC BLOOD PRESSURE: 76 MMHG | OXYGEN SATURATION: 98 % | RESPIRATION RATE: 18 BRPM

## 2023-01-04 DIAGNOSIS — I25.10 CORONARY ARTERY DISEASE INVOLVING NATIVE CORONARY ARTERY OF NATIVE HEART WITHOUT ANGINA PECTORIS: ICD-10-CM

## 2023-01-04 DIAGNOSIS — R31.29 MICROSCOPIC HEMATURIA: ICD-10-CM

## 2023-01-04 DIAGNOSIS — N40.0 BENIGN PROSTATIC HYPERPLASIA, UNSPECIFIED WHETHER LOWER URINARY TRACT SYMPTOMS PRESENT: ICD-10-CM

## 2023-01-04 DIAGNOSIS — E78.5 HYPERLIPIDEMIA LDL GOAL <70: ICD-10-CM

## 2023-01-04 DIAGNOSIS — E04.1 SOLITARY NODULE OF RIGHT LOBE OF THYROID: ICD-10-CM

## 2023-01-04 DIAGNOSIS — K63.5 BENIGN COLON POLYP: ICD-10-CM

## 2023-01-04 DIAGNOSIS — I10 ESSENTIAL HYPERTENSION: Primary | ICD-10-CM

## 2023-01-04 LAB
ALBUMIN SERPL-MCNC: 4.4 G/DL (ref 3.5–5.2)
ALBUMIN/GLOB SERPL: 1.4 G/DL
ALP SERPL-CCNC: 58 U/L (ref 39–117)
ALT SERPL W P-5'-P-CCNC: 18 U/L (ref 1–41)
ANION GAP SERPL CALCULATED.3IONS-SCNC: 9.8 MMOL/L (ref 5–15)
AST SERPL-CCNC: 15 U/L (ref 1–40)
BACTERIA UR QL AUTO: NORMAL /HPF
BASOPHILS # BLD AUTO: 0.05 10*3/MM3 (ref 0–0.2)
BASOPHILS NFR BLD AUTO: 0.8 % (ref 0–1.5)
BILIRUB BLD-MCNC: ABNORMAL MG/DL
BILIRUB SERPL-MCNC: 0.4 MG/DL (ref 0–1.2)
BUN SERPL-MCNC: 22 MG/DL (ref 8–23)
BUN/CREAT SERPL: 22.9 (ref 7–25)
CALCIUM SPEC-SCNC: 9.8 MG/DL (ref 8.6–10.5)
CHLORIDE SERPL-SCNC: 99 MMOL/L (ref 98–107)
CHOLEST SERPL-MCNC: 180 MG/DL (ref 0–200)
CLARITY, POC: CLEAR
CO2 SERPL-SCNC: 27.2 MMOL/L (ref 22–29)
COLOR UR: ABNORMAL
CREAT SERPL-MCNC: 0.96 MG/DL (ref 0.76–1.27)
DEPRECATED RDW RBC AUTO: 38.8 FL (ref 37–54)
EGFRCR SERPLBLD CKD-EPI 2021: 82.4 ML/MIN/1.73
EOSINOPHIL # BLD AUTO: 0.17 10*3/MM3 (ref 0–0.4)
EOSINOPHIL NFR BLD AUTO: 2.6 % (ref 0.3–6.2)
ERYTHROCYTE [DISTWIDTH] IN BLOOD BY AUTOMATED COUNT: 12.7 % (ref 12.3–15.4)
EXPIRATION DATE: ABNORMAL
GLOBULIN UR ELPH-MCNC: 3.1 GM/DL
GLUCOSE SERPL-MCNC: 99 MG/DL (ref 65–99)
GLUCOSE UR STRIP-MCNC: NEGATIVE MG/DL
HCT VFR BLD AUTO: 44 % (ref 37.5–51)
HDLC SERPL-MCNC: 47 MG/DL (ref 40–60)
HGB BLD-MCNC: 14.4 G/DL (ref 13–17.7)
HYALINE CASTS UR QL AUTO: NORMAL /LPF
IMM GRANULOCYTES # BLD AUTO: 0.02 10*3/MM3 (ref 0–0.05)
IMM GRANULOCYTES NFR BLD AUTO: 0.3 % (ref 0–0.5)
KETONES UR QL: NEGATIVE
LDLC SERPL CALC-MCNC: 105 MG/DL (ref 0–100)
LDLC/HDLC SERPL: 2.15 {RATIO}
LEUKOCYTE EST, POC: ABNORMAL
LYMPHOCYTES # BLD AUTO: 1.65 10*3/MM3 (ref 0.7–3.1)
LYMPHOCYTES NFR BLD AUTO: 25.7 % (ref 19.6–45.3)
Lab: ABNORMAL
MCH RBC QN AUTO: 27.6 PG (ref 26.6–33)
MCHC RBC AUTO-ENTMCNC: 32.7 G/DL (ref 31.5–35.7)
MCV RBC AUTO: 84.3 FL (ref 79–97)
MONOCYTES # BLD AUTO: 0.66 10*3/MM3 (ref 0.1–0.9)
MONOCYTES NFR BLD AUTO: 10.3 % (ref 5–12)
NEUTROPHILS NFR BLD AUTO: 3.88 10*3/MM3 (ref 1.7–7)
NEUTROPHILS NFR BLD AUTO: 60.3 % (ref 42.7–76)
NITRITE UR-MCNC: NEGATIVE MG/ML
NRBC BLD AUTO-RTO: 0 /100 WBC (ref 0–0.2)
PH UR: 5 [PH] (ref 5–8)
PLATELET # BLD AUTO: 189 10*3/MM3 (ref 140–450)
PMV BLD AUTO: 10.9 FL (ref 6–12)
POTASSIUM SERPL-SCNC: 4.5 MMOL/L (ref 3.5–5.2)
PROT SERPL-MCNC: 7.5 G/DL (ref 6–8.5)
PROT UR STRIP-MCNC: NEGATIVE MG/DL
RBC # BLD AUTO: 5.22 10*6/MM3 (ref 4.14–5.8)
RBC # UR STRIP: ABNORMAL /UL
RBC # UR STRIP: NORMAL /HPF
REF LAB TEST METHOD: NORMAL
SODIUM SERPL-SCNC: 136 MMOL/L (ref 136–145)
SP GR UR: 1.02 (ref 1–1.03)
SQUAMOUS #/AREA URNS HPF: NORMAL /HPF
TRIGL SERPL-MCNC: 160 MG/DL (ref 0–150)
TSH SERPL DL<=0.05 MIU/L-ACNC: 3.08 UIU/ML (ref 0.27–4.2)
UROBILINOGEN UR QL: NORMAL
VLDLC SERPL-MCNC: 28 MG/DL (ref 5–40)
WBC # UR STRIP: NORMAL /HPF
WBC NRBC COR # BLD: 6.43 10*3/MM3 (ref 3.4–10.8)

## 2023-01-04 PROCEDURE — 81015 MICROSCOPIC EXAM OF URINE: CPT | Performed by: INTERNAL MEDICINE

## 2023-01-04 PROCEDURE — 87086 URINE CULTURE/COLONY COUNT: CPT | Performed by: INTERNAL MEDICINE

## 2023-01-04 PROCEDURE — 36415 COLL VENOUS BLD VENIPUNCTURE: CPT | Performed by: INTERNAL MEDICINE

## 2023-01-04 PROCEDURE — 80061 LIPID PANEL: CPT | Performed by: INTERNAL MEDICINE

## 2023-01-04 PROCEDURE — 80053 COMPREHEN METABOLIC PANEL: CPT | Performed by: INTERNAL MEDICINE

## 2023-01-04 PROCEDURE — 84443 ASSAY THYROID STIM HORMONE: CPT | Performed by: INTERNAL MEDICINE

## 2023-01-04 PROCEDURE — 99214 OFFICE O/P EST MOD 30 MIN: CPT | Performed by: INTERNAL MEDICINE

## 2023-01-04 PROCEDURE — 81003 URINALYSIS AUTO W/O SCOPE: CPT | Performed by: INTERNAL MEDICINE

## 2023-01-04 PROCEDURE — 85025 COMPLETE CBC W/AUTO DIFF WBC: CPT | Performed by: INTERNAL MEDICINE

## 2023-01-04 RX ORDER — UBIDECARENONE 100 MG
100 CAPSULE ORAL DAILY
COMMUNITY

## 2023-01-04 RX ORDER — AMOXICILLIN 500 MG/1
500 CAPSULE ORAL 3 TIMES DAILY
COMMUNITY
Start: 2022-12-13 | End: 2023-01-04

## 2023-01-04 NOTE — PROGRESS NOTES
Jhon Ellis is a 77 y.o. male.     Chief Complaint   Patient presents with   • Hypertension     6 month recheck; fasting  Pt states he's worried his diastolic is getting too low when he takes it at home, states not getting dizzy or lightheaded       History obtained from the patient.      History of Present Illness     The patient has seen Dr. Blank for a right-sided Thyroid Nodule.  Ultrasound showed the nodule to be calcified, so biopsy was not recommended.  On 8/22/22, TSH was normal.  Last ultrasound was on 2/9/22 (results below).     IMPRESSION:   1.  Benign colloid cyst within the lower pole the right lobe of the  thyroid gland.  No suspicious solid thyroid nodule identified.     The patient sees Dr. Jorgensen for Restless Leg Syndrome, stable on Gabapentin and Sinemet.     The patient sees Dr. Francisco, Urology, once per year, for BPH, but  last appointment was 1/6/21.  His last PSA done here on 1/27/2022 was normal (0.854).  He is not on medication.  He denies urinary symptoms.     Cardiac Follow-up: The patient is here for a follow-up visit.       His Hypertension has been stable.    Medications: Metoprolol and Losartan.  His Hyperlipidemia has been stable.    LDL goal is < 70.  Last LDL 53, .  Medication: Atorvastatin and Co-Q10.   His Coronary Artery Disease is newly diagnosed in May 2022. (NSTEMI 5/20/22), and has been stable.  Medication: Metoprolol, Atorvastatin, Plavix, Aspirin, and Losartan.  Side Effects: None.     Procedures: On 5/20/2022, 2D Echocardiogram was normal, EF 60%.  On 5/20/2022 cardiac catheterization showed LVEF 50%, 90% RCA right posterolateral artery stenosis treated 2.5 x 12 Xience TABITHA, 40-50% mid RCA with normal IFR, 30% proximal LAD, Chronically occluded first diagonal with 3-4+ collaterals.     Interval Events:   Dr. Ugarte change the patient's Crestor to Atorvastatin in July 2022 due to myalgias and paresthesias, which have resolved.  The patient also  added Co-Q10.  He last saw Dr. Ugarte on 10/7/2022, no medication changes.  He states his blood pressure at home has been 110-115 / 55-63.  He states he sometimes feels sluggish when the blood pressure is on the low side.  He has not taken medication yet today.     Symptoms:  Denies chest pain, shortness of breath, AQUINO, orthopnea, PND, palpitations, syncope, lower extremity edema, claudication, lightheadedness, and dizziness .    Associated Symptoms: Weight down 5 pounds in the past 6 months.   Denies fatigue, headache, myalgias, arthralgias, polyuria, polydipsia, visual impairment, memory loss, concentration problems, and focal neurological deficit.     Lifestyle: The patient follows a diverse and healthy diet.  Exercise: He walks daily.  He also is active working on his cars.Tobacco Use:  Former Smoker.     Colon Polyps Follow-up: The patient is here for follow-up of Colon Polyps which has been stable.  Family History: Brother diagnosed with Stage IV Colon Cancer in his 40s.  He states his brother is dying.  Interval Events: Last Colonoscopy was done 3/28/17, sigmoid polyp.  Symptoms: Denies abdominal pain, diarrhea, constipation, hematochezia, melena, or changes in the stool.    Medication: None.       Current Outpatient Medications on File Prior to Visit   Medication Sig Dispense Refill   • acetaminophen (TYLENOL) 500 MG tablet Take 500 mg by mouth Every 6 (Six) Hours As Needed for Mild Pain .     • aspirin 81 MG EC tablet Take 1 tablet by mouth Daily. 90 tablet 1   • atorvastatin (LIPITOR) 10 MG tablet Take 1 tablet by mouth Daily. 30 tablet 11   • carbidopa-levodopa (SINEMET)  MG per tablet Take 1 tablet by mouth Daily.     • clopidogrel (PLAVIX) 75 MG tablet Take 1 tablet by mouth Daily. 90 tablet 1   • coenzyme Q10 100 MG capsule Take 100 mg by mouth Daily.     • gabapentin (NEURONTIN) 300 MG capsule Take 1 capsule by mouth Daily.     • losartan (COZAAR) 50 MG tablet TAKE 1 TABLET BY MOUTH ONCE DAILY  AT NIGHT 90 tablet 0   • metoprolol tartrate (LOPRESSOR) 25 MG tablet Take 1 tablet by mouth Every 12 (Twelve) Hours. 180 tablet 1     No current facility-administered medications on file prior to visit.       Current outpatient and discharge medications have been reconciled for the patient.  Reviewed by: Salome Knott MD        The following portions of the patient's history were reviewed and updated as appropriate: allergies, current medications, past family history, past medical history, past social history, past surgical history and problem list.    Review of Systems   Constitutional: Negative for fatigue and unexpected weight change.   Eyes: Negative for visual disturbance.   Respiratory: Negative for cough, shortness of breath and wheezing.    Cardiovascular: Negative for chest pain, palpitations and leg swelling.        No AQUINO, orthopnea, or claudication.   Gastrointestinal: Negative for abdominal pain, blood in stool, constipation, diarrhea, nausea and vomiting.        Denies melena.   Endocrine: Negative for polydipsia and polyuria.   Genitourinary: Negative for difficulty urinating, dysuria, frequency, hematuria and urgency.   Musculoskeletal: Negative for arthralgias and myalgias.   Neurological: Negative for dizziness, syncope, light-headedness and headaches.        No memory issues.   Psychiatric/Behavioral: Negative for decreased concentration.         Objective       Blood pressure 122/76, pulse 52, temperature 96.9 °F (36.1 °C), temperature source Temporal, resp. rate 18, height 167.6 cm (66\"), weight 81.4 kg (179 lb 6.4 oz), SpO2 98 %.  Body mass index is 28.96 kg/m².      Physical Exam  Vitals and nursing note reviewed.   Constitutional:       Appearance: He is well-developed and normal weight.   Neck:      Thyroid: No thyroid mass or thyromegaly.      Vascular: No carotid bruit.   Cardiovascular:      Rate and Rhythm: Normal rate and regular rhythm.      Pulses: Normal pulses.      Heart sounds:  Normal heart sounds. No murmur heard.    No friction rub. No gallop.   Pulmonary:      Effort: Pulmonary effort is normal.      Breath sounds: Normal breath sounds.   Abdominal:      General: Bowel sounds are normal. There is no distension or abdominal bruit.      Palpations: Abdomen is soft. There is no hepatomegaly, splenomegaly or mass.      Tenderness: There is no abdominal tenderness.   Musculoskeletal:      Cervical back: Normal range of motion and neck supple.      Right lower leg: No edema.      Left lower leg: No edema.   Neurological:      Mental Status: He is alert.   Psychiatric:         Mood and Affect: Mood normal.        Results for orders placed or performed in visit on 01/04/23   POC Urinalysis Dipstick, Automated    Specimen: Urine   Result Value Ref Range    Color Dark Yellow Yellow, Straw, Dark Yellow, Jaycee    Clarity, UA Clear Clear    Specific Gravity  1.020 1.005 - 1.030    pH, Urine 5.0 5.0 - 8.0    Leukocytes 25 Parul/ul (A) Negative    Nitrite, UA Negative Negative    Protein, POC Negative Negative mg/dL    Glucose, UA Negative Negative mg/dL    Ketones, UA Negative Negative    Urobilinogen, UA Normal Normal, 0.2 E.U./dL    Bilirubin 1 mg/dL (A) Negative    Blood, UA 50 John/ul (A) Negative    Lot Number [id number]     Expiration Date 11/30/23        Advance Care Planning   ACP discussion was held with the patient during this visit. Patient does not have an advance directive, declines further assistance.   ACP information pamphlet and ACP information on the AVS declined by the patient.         Assessment / Plan:  Diagnoses and all orders for this visit:    1. Essential hypertension (Primary)  -     POC Urinalysis Dipstick, Automated  -     CBC & Differential  -     TSH  -     Comprehensive Metabolic Panel  -     Lipid Panel   Continue current medication(s) as noted in the history of present illness.   I recommended he take his Losartan at bedtime, instead of the morning.      2.  Hyperlipidemia LDL goal <70  -     CBC & Differential  -     TSH  -     Comprehensive Metabolic Panel  -     Lipid Panel   Continue current medication(s) as noted in the history of present illness.    3. Coronary artery disease involving native coronary artery of native heart without angina pectoris  -     CBC & Differential  -     TSH  -     Comprehensive Metabolic Panel  -     Lipid Panel   Continue current medication(s) as noted in the history of present illness.   Follow up per Cardiology.    4. Benign colon polyp   The patient states he has his Colonoscopy scheduled with Dr. Mcclure on 1/17/2023.      5. Benign prostatic hyperplasia, unspecified whether lower urinary tract symptoms present   The patient agrees to schedule his follow-up appoint with Dr. Francisco.    6. Microscopic hematuria  -     Urinalysis, Microscopic Only - Urine, Clean Catch  -     Urine Culture - Urine, Urine, Clean Catch  -     NON-GYN CYTOLOGY, [facility] ([facility])   The patient agrees to schedule his follow-up appoint with Dr. Francisco.    7.  Solitary nodule of right thyroid   The patient agrees to schedule his follow-up appoint with Dr. Blank.    I recommended Shingrix (new Shingles vaccine) and Hepatitis A vaccination at the pharmacy.    I recommended COVID-19 bivalent booster vaccine and Influenza vaccine, at the pharmacy or in our office. The patient declined.  The patient was informed he could be hospitalized and die from COVID-19 and/or Influenza infection.  He states he will think about doing the COVID bivalent booster vaccine    I recommended CT Lung Cancer Screening.      BMI is >= 25 and <30. (Overweight) The following options were offered after discussion;: exercise counseling/recommendations and nutrition counseling/recommendations          Return in about 6 months (around 7/4/2023) for Recheck HTN, fasting.

## 2023-01-04 NOTE — PATIENT INSTRUCTIONS
I recommend taking your Losartan at bedtime, instead of the morning, as we discussed.      I recommend Shingrix (new Shingles vaccine) and Hepatitis A vaccination at the pharmacy.    I recommend the COVID-19 vaccine and Influenza vaccine, at the pharmacy or in our office, as we discussed.     I recommend CT Lung Cancer Screening, as we discussed.    MyPlate from USDA  MyPlate is an outline of a general healthy diet based on the Dietary Guidelines for Americans, 9599-7327, from the U.S. Department of Agriculture (USDA). It sets guidelines for how much food you should eat from each food group based on your age, sex, and level of physical activity.  What are tips for following MyPlate?  To follow MyPlate recommendations:  Eat a wide variety of fruits and vegetables, grains, and protein foods.  Serve smaller portions and eat less food throughout the day.  Limit portion sizes to avoid overeating.  Enjoy your food.  Get at least 150 minutes of exercise every week. This is about 30 minutes each day, 5 or more days per week.  It can be difficult to have every meal look like MyPlate. Think about MyPlate as eating guidelines for an entire day, rather than each individual meal.  Fruits and vegetables  Make one half of your plate fruits and vegetables.  Eat many different colors of fruits and vegetables each day.  For a 2,000-calorie daily food plan, eat:  2½ cups of vegetables every day.  2 cups of fruit every day.  1 cup is equal to:  1 cup raw or cooked vegetables.  1 cup raw fruit.  1 medium-sized orange, apple, or banana.  1 cup 100% fruit or vegetable juice.  2 cups raw leafy greens, such as lettuce, spinach, or kale.  ½ cup dried fruit.  Grains  One fourth of your plate should be grains.  Make at least half of the grains you eat each day whole grains.  For a 2,000-calorie daily food plan, eat 6 oz of grains every day.  1 oz is equal to:  1 slice bread.  1 cup cereal.  ½ cup cooked rice, cereal, or pasta.  Protein  One  fourth of your plate should be protein.  Eat a wide variety of protein foods, including meat, poultry, fish, eggs, beans, nuts, and tofu.  For a 2,000-calorie daily food plan, eat 5½ oz of protein every day.  1 oz is equal to:  1 oz meat, poultry, or fish.  ¼ cup cooked beans.  1 egg.  ½ oz nuts or seeds.  1 Tbsp peanut butter.  Dairy  Drink fat-free or low-fat (1%) milk.  Eat or drink dairy as a side to meals.  For a 2,000-calorie daily food plan, eat or drink 3 cups of dairy every day.  1 cup is equal to:  1 cup milk, yogurt, cottage cheese, or soy milk (soy beverage).  2 oz processed cheese.  1½ oz natural cheese.  Fats, oils, salt, and sugars  Only small amounts of oils are recommended.  Avoid foods that are high in calories and low in nutritional value (empty calories), like foods high in fat or added sugars.  Choose foods that are low in salt (sodium). Choose foods that have less than 140 milligrams (mg) of sodium per serving.  Drink water instead of sugary drinks. Drink enough fluid to keep your urine pale yellow.  Where to find support  Work with your health care provider or a dietitian to develop a customized eating plan that is right for you.  Download an stephany (mobile application) to help you track your daily food intake.  Where to find more information  USDA: ChooseMyPlate.gov  Summary  MyPlate is a general guideline for healthy eating from the USDA. It is based on the Dietary Guidelines for Americans, 1011-3369.  In general, fruits and vegetables should take up one half of your plate, grains should take up one fourth of your plate, and protein should take up one fourth of your plate.  This information is not intended to replace advice given to you by your health care provider. Make sure you discuss any questions you have with your health care provider.  Document Revised: 11/08/2021 Document Reviewed: 11/08/2021  Elsevier Patient Education © 2022 [employer].  Calorie Counting for Weight Loss  Calories  are units of energy. Your body needs a certain number of calories from food to keep going throughout the day. When you eat or drink more calories than your body needs, your body stores the extra calories mostly as fat. When you eat or drink fewer calories than your body needs, your body burns fat to get the energy it needs.  Calorie counting means keeping track of how many calories you eat and drink each day. Calorie counting can be helpful if you need to lose weight. If you eat fewer calories than your body needs, you should lose weight. Ask your health care provider what a healthy weight is for you.  For calorie counting to work, you will need to eat the right number of calories each day to lose a healthy amount of weight per week. A dietitian can help you figure out how many calories you need in a day and will suggest ways to reach your calorie goal.  A healthy amount of weight to lose each week is usually 1-2 lb (0.5-0.9 kg). This usually means that your daily calorie intake should be reduced by 500-750 calories.  Eating 1,200-1,500 calories a day can help most women lose weight.  Eating 1,500-1,800 calories a day can help most men lose weight.  What do I need to know about calorie counting?  Work with your health care provider or dietitian to determine how many calories you should get each day. To meet your daily calorie goal, you will need to:  Find out how many calories are in each food that you would like to eat. Try to do this before you eat.  Decide how much of the food you plan to eat.  Keep a food log. Do this by writing down what you ate and how many calories it had.  To successfully lose weight, it is important to balance calorie counting with a healthy lifestyle that includes regular activity.  Where do I find calorie information?  The number of calories in a food can be found on a Nutrition Facts label. If a food does not have a Nutrition Facts label, try to look up the calories online or ask your  dietitian for help.  Remember that calories are listed per serving. If you choose to have more than one serving of a food, you will have to multiply the calories per serving by the number of servings you plan to eat. For example, the label on a package of bread might say that a serving size is 1 slice and that there are 90 calories in a serving. If you eat 1 slice, you will have eaten 90 calories. If you eat 2 slices, you will have eaten 180 calories.  How do I keep a food log?  After each time that you eat, record the following in your food log as soon as possible:  What you ate. Be sure to include toppings, sauces, and other extras on the food.  How much you ate. This can be measured in cups, ounces, or number of items.  How many calories were in each food and drink.  The total number of calories in the food you ate.  Keep your food log near you, such as in a pocket-sized notebook or on an stephany or website on your mobile phone. Some programs will calculate calories for you and show you how many calories you have left to meet your daily goal.  What are some portion-control tips?  Know how many calories are in a serving. This will help you know how many servings you can have of a certain food.  Use a measuring cup to measure serving sizes. You could also try weighing out portions on a kitchen scale. With time, you will be able to estimate serving sizes for some foods.  Take time to put servings of different foods on your favorite plates or in your favorite bowls and cups so you know what a serving looks like.  Try not to eat straight from a food's packaging, such as from a bag or box. Eating straight from the package makes it hard to see how much you are eating and can lead to overeating. Put the amount you would like to eat in a cup or on a plate to make sure you are eating the right portion.  Use smaller plates, glasses, and bowls for smaller portions and to prevent overeating.  Try not to multitask. For example,  avoid watching TV or using your computer while eating. If it is time to eat, sit down at a table and enjoy your food. This will help you recognize when you are full. It will also help you be more mindful of what and how much you are eating.  What are tips for following this plan?  Reading food labels  Check the calorie count compared with the serving size. The serving size may be smaller than what you are used to eating.  Check the source of the calories. Try to choose foods that are high in protein, fiber, and vitamins, and low in saturated fat, trans fat, and sodium.  Shopping  Read nutrition labels while you shop. This will help you make healthy decisions about which foods to buy.  Pay attention to nutrition labels for low-fat or fat-free foods. These foods sometimes have the same number of calories or more calories than the full-fat versions. They also often have added sugar, starch, or salt to make up for flavor that was removed with the fat.  Make a grocery list of lower-calorie foods and stick to it.  Cooking  Try to cook your favorite foods in a healthier way. For example, try baking instead of frying.  Use low-fat dairy products.  Meal planning  Use more fruits and vegetables. One-half of your plate should be fruits and vegetables.  Include lean proteins, such as chicken, turkey, and fish.  Lifestyle  Each week, aim to do one of the followin minutes of moderate exercise, such as walking.  75 minutes of vigorous exercise, such as running.  General information  Know how many calories are in the foods you eat most often. This will help you calculate calorie counts faster.  Find a way of tracking calories that works for you. Get creative. Try different apps or programs if writing down calories does not work for you.  What foods should I eat?    Eat nutritious foods. It is better to have a nutritious, high-calorie food, such as an avocado, than a food with few nutrients, such as a bag of potato  chips.  Use your calories on foods and drinks that will fill you up and will not leave you hungry soon after eating.  Examples of foods that fill you up are nuts and nut butters, vegetables, lean proteins, and high-fiber foods such as whole grains. High-fiber foods are foods with more than 5 g of fiber per serving.  Pay attention to calories in drinks. Low-calorie drinks include water and unsweetened drinks.  The items listed above may not be a complete list of foods and beverages you can eat. Contact a dietitian for more information.  What foods should I limit?  Limit foods or drinks that are not good sources of vitamins, minerals, or protein or that are high in unhealthy fats. These include:  Candy.  Other sweets.  Sodas, specialty coffee drinks, alcohol, and juice.  The items listed above may not be a complete list of foods and beverages you should avoid. Contact a dietitian for more information.  How do I count calories when eating out?  Pay attention to portions. Often, portions are much larger when eating out. Try these tips to keep portions smaller:  Consider sharing a meal instead of getting your own.  If you get your own meal, eat only half of it. Before you start eating, ask for a container and put half of your meal into it.  When available, consider ordering smaller portions from the menu instead of full portions.  Pay attention to your food and drink choices. Knowing the way food is cooked and what is included with the meal can help you eat fewer calories.  If calories are listed on the menu, choose the lower-calorie options.  Choose dishes that include vegetables, fruits, whole grains, low-fat dairy products, and lean proteins.  Choose items that are boiled, broiled, grilled, or steamed. Avoid items that are buttered, battered, fried, or served with cream sauce. Items labeled as crispy are usually fried, unless stated otherwise.  Choose water, low-fat milk, unsweetened iced tea, or other drinks without  added sugar. If you want an alcoholic beverage, choose a lower-calorie option, such as a glass of wine or light beer.  Ask for dressings, sauces, and syrups on the side. These are usually high in calories, so you should limit the amount you eat.  If you want a salad, choose a garden salad and ask for grilled meats. Avoid extra toppings such as jones, cheese, or fried items. Ask for the dressing on the side, or ask for olive oil and vinegar or lemon to use as dressing.  Estimate how many servings of a food you are given. Knowing serving sizes will help you be aware of how much food you are eating at restaurants.  Where to find more information  Centers for Disease Control and Prevention: www.cdc.gov  U.[place]. Department of Agriculture: myplate.gov  Summary  Calorie counting means keeping track of how many calories you eat and drink each day. If you eat fewer calories than your body needs, you should lose weight.  A healthy amount of weight to lose per week is usually 1-2 lb (0.5-0.9 kg). This usually means reducing your daily calorie intake by 500-750 calories.  The number of calories in a food can be found on a Nutrition Facts label. If a food does not have a Nutrition Facts label, try to look up the calories online or ask your dietitian for help.  Use smaller plates, glasses, and bowls for smaller portions and to prevent overeating.  Use your calories on foods and drinks that will fill you up and not leave you hungry shortly after a meal.  This information is not intended to replace advice given to you by your health care provider. Make sure you discuss any questions you have with your health care provider.  Document Revised: 01/28/2021 Document Reviewed: 01/28/2021  Elsevier Patient Education © 2022 [employer].  Exercising to Stay Healthy  To become healthy and stay healthy, it is recommended that you do moderate-intensity and vigorous-intensity exercise. You can tell that you are exercising at a moderate intensity  if your heart starts beating faster and you start breathing faster but can still hold a conversation. You can tell that you are exercising at a vigorous intensity if you are breathing much harder and faster and cannot hold a conversation while exercising.  How can exercise benefit me?  Exercising regularly is important. It has many health benefits, such as:  Improving overall fitness, flexibility, and endurance.  Increasing bone density.  Helping with weight control.  Decreasing body fat.  Increasing muscle strength and endurance.  Reducing stress and tension, anxiety, depression, or anger.  Improving overall health.  What guidelines should I follow while exercising?  Before you start a new exercise program, talk with your health care provider.  Do not exercise so much that you hurt yourself, feel dizzy, or get very short of breath.  Wear comfortable clothes and wear shoes with good support.  Drink plenty of water while you exercise to prevent dehydration or heat stroke.  Work out until your breathing and your heartbeat get faster (moderate intensity).  How often should I exercise?  Choose an activity that you enjoy, and set realistic goals. Your health care provider can help you make an activity plan that is individually designed and works best for you.  Exercise regularly as told by your health care provider. This may include:  Doing strength training two times a week, such as:  Lifting weights.  Using resistance bands.  Push-ups.  Sit-ups.  Yoga.  Doing a certain intensity of exercise for a given amount of time. Choose from these options:  A total of 150 minutes of moderate-intensity exercise every week.  A total of 75 minutes of vigorous-intensity exercise every week.  A mix of moderate-intensity and vigorous-intensity exercise every week.  Children, pregnant women, people who have not exercised regularly, people who are overweight, and older adults may need to talk with a health care provider about what  activities are safe to perform. If you have a medical condition, be sure to talk with your health care provider before you start a new exercise program.  What are some exercise ideas?  Moderate-intensity exercise ideas include:  Walking 1 mile (1.6 km) in about 15 minutes.  Biking.  Hiking.  Golfing.  Dancing.  Water aerobics.  Vigorous-intensity exercise ideas include:  Walking 4.5 miles (7.2 km) or more in about 1 hour.  Jogging or running 5 miles (8 km) in about 1 hour.  Biking 10 miles (16.1 km) or more in about 1 hour.  Lap swimming.  Roller-skating or in-line skating.  Cross-country skiing.  Vigorous competitive sports, such as football, basketball, and soccer.  Jumping rope.  Aerobic dancing.  What are some everyday activities that can help me get exercise?  Yard work, such as:  Pushing a .  Raking and bagging leaves.  Washing your car.  Pushing a stroller.  Shoveling snow.  Gardening.  Washing windows or floors.  How can I be more active in my day-to-day activities?  Use stairs instead of an elevator.  Take a walk during your lunch break.  If you drive, park your car farther away from your work or school.  If you take public transportation, get off one stop early and walk the rest of the way.  Stand up or walk around during all of your indoor phone calls.  Get up, stretch, and walk around every 30 minutes throughout the day.  Enjoy exercise with a friend. Support to continue exercising will help you keep a regular routine of activity.  Where to find more information  You can find more information about exercising to stay healthy from:  [place] Department of Health and Human Services: www.hhs.gov  Centers for Disease Control and Prevention (CDC): www.cdc.gov  Summary  Exercising regularly is important. It will improve your overall fitness, flexibility, and endurance.  Regular exercise will also improve your overall health. It can help you control your weight, reduce stress, and improve your bone  density.  Do not exercise so much that you hurt yourself, feel dizzy, or get very short of breath.  Before you start a new exercise program, talk with your health care provider.  This information is not intended to replace advice given to you by your health care provider. Make sure you discuss any questions you have with your health care provider.  Document Revised: 04/15/2022 Document Reviewed: 04/15/2022  Elsevier Patient Education © 2022 [employer] Inc.

## 2023-01-05 LAB
BACTERIA SPEC AEROBE CULT: NO GROWTH
REF LAB TEST METHOD: NORMAL

## 2023-01-08 ENCOUNTER — TELEPHONE (OUTPATIENT)
Dept: INTERNAL MEDICINE | Facility: CLINIC | Age: 78
End: 2023-01-08
Payer: MEDICARE

## 2023-01-08 NOTE — TELEPHONE ENCOUNTER
Call patient please.  His cholesterol levels on his recent labs are not quite at goal.  I recommend increasing his Atorvastatin to 20 mg daily.  If he is agreeable, I will send a prescription to the pharmacy.  I will also send a lab letter.

## 2023-01-09 ENCOUNTER — TELEPHONE (OUTPATIENT)
Dept: CARDIOLOGY | Facility: CLINIC | Age: 78
End: 2023-01-09
Payer: MEDICARE

## 2023-01-09 DIAGNOSIS — I10 ESSENTIAL HYPERTENSION: ICD-10-CM

## 2023-01-09 PROCEDURE — 36415 COLL VENOUS BLD VENIPUNCTURE: CPT

## 2023-01-09 PROCEDURE — 99282 EMERGENCY DEPT VISIT SF MDM: CPT

## 2023-01-09 NOTE — TELEPHONE ENCOUNTER
Pt reports his Diastolic BP being in the 60's , pt was feeling sluggish. Pt was advised by Dr. Knott his PCP to take his Losartan 50 mg at night instead of in the morning. Pt is now reporting HTN in the morning even though he started to take the Losartan at night.     BP/ HR:  1/9 150/69 HR 53  1/8 /69 HR 65  Evening 105/61 HR 73  1/7 /80 HR 65        /63 HR 67    Current Cardiac Meds:  Metoprolol 25 mg BID  Plavix 75 mg Daily  Losartan 50 mg at HS Daily  Atorvastatin 10 mg Daily  Aspirin 81 mg Daily    Pt denies any other symptoms.     Please advise.

## 2023-01-10 ENCOUNTER — HOSPITAL ENCOUNTER (EMERGENCY)
Facility: HOSPITAL | Age: 78
Discharge: HOME | End: 2023-01-10
Attending: STUDENT IN AN ORGANIZED HEALTH CARE EDUCATION/TRAINING PROGRAM | Admitting: STUDENT IN AN ORGANIZED HEALTH CARE EDUCATION/TRAINING PROGRAM
Payer: MEDICARE

## 2023-01-10 VITALS
DIASTOLIC BLOOD PRESSURE: 88 MMHG | WEIGHT: 180 LBS | HEART RATE: 63 BPM | HEIGHT: 66 IN | BODY MASS INDEX: 28.93 KG/M2 | OXYGEN SATURATION: 99 % | TEMPERATURE: 97.6 F | RESPIRATION RATE: 18 BRPM | SYSTOLIC BLOOD PRESSURE: 162 MMHG

## 2023-01-10 DIAGNOSIS — E78.5 HYPERLIPIDEMIA LDL GOAL <70: ICD-10-CM

## 2023-01-10 DIAGNOSIS — R09.89 LABILE BLOOD PRESSURE: Primary | ICD-10-CM

## 2023-01-10 DIAGNOSIS — I10 ESSENTIAL HYPERTENSION: ICD-10-CM

## 2023-01-10 LAB
ANION GAP SERPL CALCULATED.3IONS-SCNC: 14 MMOL/L (ref 5–15)
BUN SERPL-MCNC: 27 MG/DL (ref 8–23)
BUN/CREAT SERPL: 38.6 (ref 7–25)
CALCIUM SPEC-SCNC: 9.4 MG/DL (ref 8.6–10.5)
CHLORIDE SERPL-SCNC: 100 MMOL/L (ref 98–107)
CO2 SERPL-SCNC: 19 MMOL/L (ref 22–29)
CREAT SERPL-MCNC: 0.7 MG/DL (ref 0.76–1.27)
EGFRCR SERPLBLD CKD-EPI 2021: 96.1 ML/MIN/1.73
GLUCOSE SERPL-MCNC: 138 MG/DL (ref 65–99)
HOLD SPECIMEN: NORMAL
POTASSIUM SERPL-SCNC: 3.7 MMOL/L (ref 3.5–5.2)
QT INTERVAL: 380 MS
QTC INTERVAL: 407 MS
SODIUM SERPL-SCNC: 133 MMOL/L (ref 136–145)
WHOLE BLOOD HOLD COAG: NORMAL
WHOLE BLOOD HOLD SPECIMEN: NORMAL

## 2023-01-10 PROCEDURE — 93005 ELECTROCARDIOGRAM TRACING: CPT | Performed by: STUDENT IN AN ORGANIZED HEALTH CARE EDUCATION/TRAINING PROGRAM

## 2023-01-10 PROCEDURE — 93005 ELECTROCARDIOGRAM TRACING: CPT

## 2023-01-10 PROCEDURE — 80048 BASIC METABOLIC PNL TOTAL CA: CPT

## 2023-01-10 PROCEDURE — 36415 COLL VENOUS BLD VENIPUNCTURE: CPT

## 2023-01-10 RX ORDER — CLONIDINE HYDROCHLORIDE 0.1 MG/1
0.1 TABLET ORAL 2 TIMES DAILY PRN
Qty: 6 TABLET | Refills: 0 | Status: SHIPPED | OUTPATIENT
Start: 2023-01-10

## 2023-01-10 RX ORDER — LOSARTAN POTASSIUM 50 MG/1
50 TABLET ORAL DAILY
Qty: 90 TABLET | Refills: 0 | Status: SHIPPED | OUTPATIENT
Start: 2023-01-10

## 2023-01-10 RX ORDER — SODIUM CHLORIDE 0.9 % (FLUSH) 0.9 %
10 SYRINGE (ML) INJECTION AS NEEDED
Status: DISCONTINUED | OUTPATIENT
Start: 2023-01-10 | End: 2023-01-10 | Stop reason: HOSPADM

## 2023-01-10 NOTE — DISCHARGE INSTRUCTIONS
Continue to monitor your blood pressure checking it at rest twice a day once in the morning and once in the evening.  You can use the provided medication for systolic blood pressure consistently above 180.  Follow-up with cardiology.  While today's work-up was reassuring should your symptoms change or worsen please return to the ED or seek other medical care.

## 2023-01-10 NOTE — TELEPHONE ENCOUNTER
LVM to notify pt \"Could be because his medications have been adjusted. Would go back to 50 mg of Losartan in the morning and follow BPs and have him call us back next week. Can use clonidine if needed\" per Lacey Juarez APRN.

## 2023-01-10 NOTE — TELEPHONE ENCOUNTER
Notified of the lab work.  He would like to wait on increasing his medication at this times.    He states he would like to improve his diet and exercise for 6 weeks and then repeat his labs.  He states he ate a unhealthy diet during the mickey holidays and feels he can get his cholesterol down.

## 2023-01-10 NOTE — TELEPHONE ENCOUNTER
Notified pt \"Cut losartan dose to 25 mg daily and move back to daytime and have him continue to monitor BP and let us know next week how it is doing. May half his 50 mg tabs.  \" per Lacey BRADY.     Pt states he went to the ER last night due to his BP being  and not coming down. Notes in Epic. Pt was give Clonidine 0.1 mg tabs BID PRN for SBP over 180.    Do you still want to go with the same recommendations?    Pt is wanting to know why his BP is labile and if changing the losartan will help this?

## 2023-01-11 NOTE — ED PROVIDER NOTES
EMERGENCY DEPARTMENT ENCOUNTER    Pt Name: Jasiel Ellis  MRN: [id number]  Pt :   1945  Room Number:  1616  Date of encounter:  2023  PCP: Salome Knott MD  ED Provider: Dex Gutiérrez MD    Historian: Patient, wife      HPI:  Chief Complaint: Hypertension        Context: Jasiel Ellis is a 77 y.o. male who presents to the ED for evaluation of asymptomatic hypertension.  He says he has been in good health recently and recent PCP visit everything was normal but he does have high blood pressure managed with losartan.  He was checking his blood pressure recently and it has been more elevated.  Systolic got as high as in the 190s today so he presents for evaluation.  He denies chest pain, headache, or any other symptoms at this time.  He denies any new stressors.  Denies fever or chills.  No other complaints at this time.      PAST MEDICAL HISTORY  Past Medical History:   Diagnosis Date   • Benign colon polyp     Dx  (rectal polyp ? path).   • Bilateral hearing loss     Since approx  (L>R).   • BPH (benign prostatic hyperplasia)     Sees Dr. Francisco   • Chronic pain of both knees     R>L.  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   • Coronary artery disease involving native coronary artery of native heart     dx 2022- Cardiac catheterization for NSTEMI (2022): Severe 1-vessel CAD (RPL) status post PCI with TABITHA. Echo (2020): LVEF 60%.  Normal functioning valves   • Depression     I did not have depression   • Essential hypertension     Dx approx .   • History of cardiac catheterization 2022    LVEF 50%, 90% RCA right posterolateral artery stenosis treated 2.5 x 12 Xience TABITHA, 40-50% mid RCA with normal IFR, 30% proximal LAD, Chronically occluded first diagonal with 3-4+ collaterals   • History of echocardiogram 2018    EF 60-65%, trace MR/TR, mildly increased LV wall thickness.   • History of echocardiogram  2022    Normal.  EF 60 %   • History of varicella    • Hyperlipidemia     Dx  ().   • Infectious viral hepatitis     I dit not have hepatitis   • NSTEMI (non-ST elevated myocardial infarction) (HCC) 2022    Cardiac catheterization for NSTEMI (): Severe 1-vessel CAD (RPL) status post PCI with TABITHA. Echo (2020): LVEF 60%.  Normal functioning valves   • Restless legs syndrome (RLS)     Saw Jameel PERRY).   • Solitary nodule of right lobe of thyroid     Dx 2018 (approx) per Dr. Blank.  Patient states ultrasound showed a Calcium deposit.         PAST SURGICAL HISTORY  Past Surgical History:   Procedure Laterality Date   • BLADDER STONE REMOVAL OPEN      Nolan   • CARDIAC CATHETERIZATION N/A 2022    Procedure: Left Heart Cath;  Surgeon: Stef Ugarte MD;  Location: [facility] CATH INVASIVE LOCATION;  Service: Cardiovascular;  Laterality: N/A;   • INGUINAL HERNIA REPAIR Right     mesh placed (approx date)   • LASER OF PROSTATE W/ GREEN LIGHT PVP      Nolan         FAMILY HISTORY  Family History   Problem Relation Age of Onset   • No Known Problems Mother    • No Known Problems Father    • Colon cancer Brother         dx 40's, stage 4   • Diabetes Brother    • Lung cancer Brother         dx 60's mx to bone   • Diabetes Brother          SOCIAL HISTORY  Social History     Socioeconomic History   • Marital status:    Tobacco Use   • Smoking status: Former     Packs/day: 1.00     Years: 50.00     Pack years: 50.00     Types: Cigarettes     Start date: 1965     Quit date: 2015     Years since quittin.7   • Smokeless tobacco: Never   Vaping Use   • Vaping Use: Never used   Substance and Sexual Activity   • Alcohol use: Yes     Comment: 2 beers per week   • Drug use: No   • Sexual activity: Yes     Partners: Female         ALLERGIES  Crestor [rosuvastatin]        REVIEW OF SYSTEMS  Review of Systems       All systems reviewed and negative except for those  discussed in HPI.       PHYSICAL EXAM    I have reviewed the triage vital signs and nursing notes.    ED Triage Vitals [01/09/23 2357]   Temp Heart Rate Resp BP SpO2   97.6 °F (36.4 °C) 63 18 (!) 188/96 99 %      Temp src Heart Rate Source Patient Position BP Location FiO2 (%)   Oral Monitor Sitting Left arm --       Physical Exam  GENERAL:   Appears in no acute distress.   HENT: Nares patent.  EYES: No scleral icterus.  CV: Regular rhythm, regular rate.  RESPIRATORY: Normal effort.  No audible wheezes, rales or rhonchi.  ABDOMEN: Soft, nontender  MUSCULOSKELETAL: No deformities.   NEURO: Alert, moves all extremities, follows commands.  SKIN: Warm, dry, no rash visualized.      LAB RESULTS  Recent Results (from the past 24 hour(s))   ECG 12 Lead Other; HTN - per Dr. Gutiérrez    Collection Time: 01/10/23 12:11 AM   Result Value Ref Range    QT Interval 380 ms    QTC Interval 407 ms   Green Top (Gel)    Collection Time: 01/10/23 12:12 AM   Result Value Ref Range    Extra Tube Hold for add-ons.    Lavender Top    Collection Time: 01/10/23 12:12 AM   Result Value Ref Range    Extra Tube hold for add-on    Gold Top - SST    Collection Time: 01/10/23 12:12 AM   Result Value Ref Range    Extra Tube Hold for add-ons.    Gray Top    Collection Time: 01/10/23 12:12 AM   Result Value Ref Range    Extra Tube Hold for add-ons.    Light Blue Top    Collection Time: 01/10/23 12:12 AM   Result Value Ref Range    Extra Tube Hold for add-ons.    Basic Metabolic Panel    Collection Time: 01/10/23 12:12 AM    Specimen: Blood   Result Value Ref Range    Glucose 138 (H) 65 - 99 mg/dL    BUN 27 (H) 8 - 23 mg/dL    Creatinine 0.70 (L) 0.76 - 1.27 mg/dL    Sodium 133 (L) 136 - 145 mmol/L    Potassium 3.7 3.5 - 5.2 mmol/L    Chloride 100 98 - 107 mmol/L    CO2 19.0 (L) 22.0 - 29.0 mmol/L    Calcium 9.4 8.6 - 10.5 mg/dL    BUN/Creatinine Ratio 38.6 (H) 7.0 - 25.0    Anion Gap 14.0 5.0 - 15.0 mmol/L    eGFR 96.1 >60.0 mL/min/1.73       If labs  were ordered, I independently reviewed the results and considered them in treating the patient.        RADIOLOGY  No Radiology Exams Resulted Within Past 24 Hours    I ordered and independently reviewed the above noted radiographic studies.          See radiologist's dictation for official interpretation.        PROCEDURES    Procedures    ECG 12 Lead Other; HTN - per Dr. Gutiérrez   Preliminary Result   Test Reason : Other~   Blood Pressure :   */*   mmHG   Vent. Rate :  69 BPM     Atrial Rate :  69 BPM      P-R Int : 162 ms          QRS Dur :  66 ms       QT Int : 380 ms       P-R-T Axes :  34 -23  32 degrees      QTc Int : 407 ms      Sinus rhythm with occasional premature ventricular complexes   Moderate voltage criteria for LVH, may be normal variant   Borderline ECG   When compared with ECG of 22-AUG-2022 11:15,   premature ventricular complexes are now present      Referred By: CCREECH           Confirmed By:           MEDICATIONS GIVEN IN ER    Medications - No data to display      MEDICAL DECISION MAKING, PROGRESS, and CONSULTS    All labs have been independently reviewed by me.  All radiology studies have been reviewed by me and the radiologist dictating the report.  All EKG's have been independently viewed and interpreted by me.      Discussion below represents my analysis of pertinent findings related to patient's condition, differential diagnosis, treatment plan and final disposition.      Differential diagnosis:    MI, acute kidney injury, electrolyte abnormality, hypertensive urgency, hypertensive emergency      Additional sources:    - Discussed/ obtained information from independent historians: Wife    - External (non-ED) record review: Recent PCP note and cardiology note with Dr. Ugarte          Orders placed during this visit:  Orders Placed This Encounter   Procedures   • Darragh Draw   • Basic Metabolic Panel   • Ambulatory Referral to [facility] Hypertension [facility]   • ECG 12 Lead Other; HTN - per   Marla   • Green Top (Gel)   • Lavender Top   • Gold Top - SST   • Gray Top   • Light Blue Top         Additional orders considered but not ordered:      ED Course:    Consultants:      ED Course as of 01/10/23 1921   Tue Eugenio 10, 2023   0133 Basic Metabolic Panel(!)  No sonny, sodium 133 [MA]      ED Course User Index  [MA] Katharine Benjamin, MADELYN     He arrived awake and alert but significantly hypertensive systolic in the 180s.  He is asymptomatic at this time.  Without intervention blood pressure improved systolic now in the mid 150s and he is reassured by this.  BMP does not show any acute renal injury.  Currently asymptomatic.  ECG borderline LVH consistent with his known hypertension.  We discussed at length the risk of hypotension which she says he has a history of and why it is recommended not to treat asymptomatic hypertension in the emergency department.  Because of significant hypertension today though I am providing him as needed clonidine for multiple measurements with systolic more than 180.  He will follow-up with Dr. Ugarte.  He was also provided hypertension clinic follow-up.  Counseled strict return precautions verbally expressed understanding of these.             AS OF 19:21 EST VITALS:    BP - 162/88  HR - 63  TEMP - 97.6 °F (36.4 °C) (Oral)  O2 SATS - 99%                  DIAGNOSIS  Final diagnoses:   Labile blood pressure   Essential hypertension   Hyperlipidemia LDL goal <70         DISPOSITION  DISCHARGE    Patient discharged in stable condition.    Reviewed implications of results, diagnosis, meds, responsibility to follow up, warning signs and symptoms of possible worsening, potential complications and reasons to return to ER.    Patient/Family voiced understanding of above instructions.    Discussed plan for discharge, as there is no emergent indication for admission.  Pt/family is agreeable and understands need for follow up and possible repeat testing.  Pt/family is aware that discharge  does not mean that nothing is wrong but that it indicates no emergency is currently present that requires admission and they must continue care with follow-up as given below or with a physician of their choice.     FOLLOW-UP  Salome Knott MD  [address]  [address]  [place] [address]  [phone]    Call       [facility] CARDIOLOGY  [address]  [address]  [facility] [id number] [phone]        Stef Ugarte MD  [address]  [address]  [place] [id number]  [phone]    Call            Medication List      New Prescriptions    cloNIDine 0.1 MG tablet  Commonly known as: CATAPRES  Take 1 tablet by mouth 2 (Two) Times a Day As Needed for High Blood Pressure (Systolic blood pressure consistently above 180).           Where to Get Your Medications      These medications were sent to [address] - [phone]  - [phone] [place] [address] [facility] [address]    Phone: [phone]   · cloNIDine 0.1 MG tablet             Please note that portions of this document were completed with voice recognition software.        Dex Gutiérrez MD  01/10/23 1927

## 2023-01-29 ENCOUNTER — TELEPHONE (OUTPATIENT)
Dept: INTERNAL MEDICINE | Facility: CLINIC | Age: 78
End: 2023-01-29
Payer: MEDICARE

## 2023-02-02 NOTE — TELEPHONE ENCOUNTER
PASTOR to return call to office #[phone] regarding results.     Spoke with DANETTE Gastro and requested Colonoscopy records from [facility] to fax #[phone]    Spoke with DANETTE Starks who states PSA was not completed since 2022. Dr Francisco does not recommend this until next year due to previous score, 0.854, and age. Dr Francisco recommends having this completed every other year, contingent upon normal results

## 2023-04-18 DIAGNOSIS — G25.81 RESTLESS LEGS SYNDROME (RLS): Primary | ICD-10-CM

## 2023-04-18 RX ORDER — GABAPENTIN 300 MG/1
300 CAPSULE ORAL DAILY
Qty: 30 CAPSULE | Refills: 5 | Status: SHIPPED | OUTPATIENT
Start: 2023-04-18 | End: 2023-04-21 | Stop reason: SDUPTHER

## 2023-04-18 NOTE — TELEPHONE ENCOUNTER
Caller: ABEL MELENDEZ    Relationship: Emergency Contact    Best call back number: [phone]    Requested Prescriptions:   Requested Prescriptions     Pending Prescriptions Disp Refills   • gabapentin (NEURONTIN) 300 MG capsule       Sig: Take 1 capsule by mouth Daily.        Pharmacy where request should be sent: [place] [address] [address] [facility] [phone] [facility] [phone]      Last office visit with prescribing clinician: 1/4/2023   Last telemedicine visit with prescribing clinician: 7/5/2023   Next office visit with prescribing clinician: 7/5/2023     Additional details provided by patient: THE DOCTOR THAT PRESCRIBES THIS MEDICATION REQUIRES AN APPOINTMENT BUT COULDN'T GET HIM IN UNTIL November.    Does the patient have less than a 3 day supply:  [x] Yes  [] No    Would you like a call back once the refill request has been completed: [x] Yes [] No    If the office needs to give you a call back, can they leave a voicemail: [x] Yes [] No    Ann Duke Rep   04/18/23 12:15 EDT

## 2023-04-18 NOTE — TELEPHONE ENCOUNTER
Prescription sent to pharmacy.  However, the patient should keep his appointment with Dr. Jorgensen.

## 2023-04-18 NOTE — TELEPHONE ENCOUNTER
See message from patient in this message     Last office visit (LOV) for chronic condition 1/4/2023  Next office visit (NOV) 7/5/2023

## 2023-04-18 NOTE — TELEPHONE ENCOUNTER
Left message to return call  Office # given   HUBB please read message to patient   Prescription sent to pharmacy.  However, the patient should keep his appointment with Dr. Jorgensen.

## 2023-04-20 ENCOUNTER — TELEPHONE (OUTPATIENT)
Dept: INTERNAL MEDICINE | Facility: CLINIC | Age: 78
End: 2023-04-20
Payer: MEDICARE

## 2023-04-20 DIAGNOSIS — G25.81 RESTLESS LEGS SYNDROME (RLS): ICD-10-CM

## 2023-04-20 NOTE — TELEPHONE ENCOUNTER
Caller: Jasiel Ellis    Relationship: Self    Best call back number: [phone]    What is the best time to reach you: ANY TIME    Who are you requesting to speak with (clinical staff, provider,  specific staff member): OSN    Do you know the name of the person who called: SELF    What was the call regarding: PATIENT WOULD LIKE TO TALK TO SON. WOULD LIKE A CALL BACK WITHIN THE NEXT HALF HOUR.    Do you require a callback: YES

## 2023-04-20 NOTE — TELEPHONE ENCOUNTER
According to our medication list, he only takes it once per day.  How long has he been taking it more than that and how many times a day does he take it?

## 2023-04-20 NOTE — TELEPHONE ENCOUNTER
Called and spoke to patient. He stated that he spoke to the pharmacy and he is requesting a change on the dosage of Neuontin 300mg that was prescribed. It was written for 30pills every month and he normally is prescribed 90pills a month since he takes the medication several times a day. Please advise.

## 2023-04-21 RX ORDER — GABAPENTIN 300 MG/1
300 CAPSULE ORAL 3 TIMES DAILY PRN
Qty: 90 CAPSULE | Refills: 5 | Status: SHIPPED | OUTPATIENT
Start: 2023-04-21

## 2023-04-21 NOTE — TELEPHONE ENCOUNTER
PATIENT STATES THAT SOMETIMES HE ONLY TAKES ONE A DAY, OTHER DAYS HE NEED UP TO 3. ASKING FOR 90 PILLS A MONTH FOR 3 PILLS A DAY.   HUB

## 2023-05-09 DIAGNOSIS — I10 ESSENTIAL HYPERTENSION: ICD-10-CM

## 2023-05-09 DIAGNOSIS — I25.110 CORONARY ARTERY DISEASE INVOLVING NATIVE CORONARY ARTERY OF NATIVE HEART WITH UNSTABLE ANGINA PECTORIS: ICD-10-CM

## 2023-05-09 RX ORDER — CLOPIDOGREL BISULFATE 75 MG/1
TABLET ORAL
Qty: 90 TABLET | Refills: 0 | Status: SHIPPED | OUTPATIENT
Start: 2023-05-09

## 2023-05-09 RX ORDER — LOSARTAN POTASSIUM 50 MG/1
TABLET ORAL
Qty: 90 TABLET | Refills: 0 | Status: SHIPPED | OUTPATIENT
Start: 2023-05-09

## 2023-05-26 DIAGNOSIS — I25.110 CORONARY ARTERY DISEASE INVOLVING NATIVE CORONARY ARTERY OF NATIVE HEART WITH UNSTABLE ANGINA PECTORIS: ICD-10-CM

## 2023-08-02 DIAGNOSIS — I25.110 CORONARY ARTERY DISEASE INVOLVING NATIVE CORONARY ARTERY OF NATIVE HEART WITH UNSTABLE ANGINA PECTORIS: ICD-10-CM

## 2023-08-02 DIAGNOSIS — I10 ESSENTIAL HYPERTENSION: ICD-10-CM

## 2023-08-03 DIAGNOSIS — I10 ESSENTIAL HYPERTENSION: ICD-10-CM

## 2023-08-03 DIAGNOSIS — I25.110 CORONARY ARTERY DISEASE INVOLVING NATIVE CORONARY ARTERY OF NATIVE HEART WITH UNSTABLE ANGINA PECTORIS: ICD-10-CM

## 2023-08-03 RX ORDER — CLOPIDOGREL BISULFATE 75 MG/1
TABLET ORAL
Qty: 90 TABLET | Refills: 0 | Status: SHIPPED | OUTPATIENT
Start: 2023-08-03 | End: 2023-08-03 | Stop reason: SDUPTHER

## 2023-08-03 RX ORDER — CLOPIDOGREL BISULFATE 75 MG/1
75 TABLET ORAL DAILY
Qty: 90 TABLET | Refills: 3 | Status: SHIPPED | OUTPATIENT
Start: 2023-08-03

## 2023-08-03 RX ORDER — ATORVASTATIN CALCIUM 10 MG/1
TABLET, FILM COATED ORAL
Qty: 90 TABLET | Refills: 0 | Status: SHIPPED | OUTPATIENT
Start: 2023-08-03 | End: 2023-08-03 | Stop reason: SDUPTHER

## 2023-08-03 RX ORDER — LOSARTAN POTASSIUM 50 MG/1
50 TABLET ORAL DAILY
Qty: 90 TABLET | Refills: 3 | Status: SHIPPED | OUTPATIENT
Start: 2023-08-03

## 2023-08-03 RX ORDER — ATORVASTATIN CALCIUM 10 MG/1
10 TABLET, FILM COATED ORAL DAILY
Qty: 90 TABLET | Refills: 3 | Status: SHIPPED | OUTPATIENT
Start: 2023-08-03

## 2023-08-03 RX ORDER — LOSARTAN POTASSIUM 50 MG/1
TABLET ORAL
Qty: 90 TABLET | Refills: 0 | Status: SHIPPED | OUTPATIENT
Start: 2023-08-03 | End: 2023-08-03 | Stop reason: SDUPTHER

## 2023-08-15 ENCOUNTER — TELEPHONE (OUTPATIENT)
Dept: CARDIOLOGY | Facility: CLINIC | Age: 78
End: 2023-08-15

## 2023-08-15 NOTE — TELEPHONE ENCOUNTER
Caller:     Relationship:     Best call back number: [phone]    What is the best time to reach you: ANYTIME    Who are you requesting to speak with (clinical staff, provider,  specific staff member): CLINICAL      What was the call regarding: PT BLOOD PRESSURE IS REALLY HIGH. FLUCTUATING BETWEEN 180/78- 140/60. HE ALSO IS HAVING A PAIN IN HIS LEFT SHOULDER. THESE SYMPTOMS STARTED A FEW DAYS AGO. PT IS ON VACATION AND WOULD LIKE A NURSE TO GIVE HIM SOME ADVISE.    Is it okay if the provider responds through [employer]:   YOU CAN MESSAGE THROUGH [employer]

## 2023-11-03 NOTE — PROGRESS NOTES
[facility] Cardiology  Subjective:     Encounter Date: 11/06/2023      Patient ID: Jasiel Ellis is a 78 y.o. male.    Chief Complaint: Coronary artery disease involving native coronary artery of (1-yr F/U)      PROBLEM LIST:  Coronary artery disease involving native coronary artery of native heart:  AAA US, 07/25/2019: Patent nonaneurysmal abdominal aorta.   Echo, 05/20/2022: EF 60%.   Cardiac catheterization, 05/20/2022: 90% right posterolateral artery stenosis treated 2.5 x 12 Xience TABITHA. Chronically occluded first diagonal with 3-4+ collaterals. 40-50% mid RCA with normal IFR. 30% proximal LAD. LVEF 50%.  MCOT, 09/30/2022: No A. fib. PVC iban 8%. PAC burden 2%.  NSTEMI.  Essential hypertension.  Hyperlipidemia LDL goal <70.  Former smoker.       History of Present Illness  Jasiel Ellis returns today for a 1 year follow up with a history of CAD and cardiac risk factors. Since last visit, patient has been doing well overall from a cardiovascular standpoint. He stays busy and active by working. Patient notes occasional myalgia primarily in his upper arms that he attributes to statin therapy. He monitors his blood pressure regularly at home and notices that it is elevated in the morning prior to taking his medications. Patient states his heart rate is usually 55-60 bpm at rest. He denies chest pain, shortness of breath, orthopnea, palpitations, edema, dizziness, and syncope.     Allergies   Allergen Reactions    Crestor [Rosuvastatin] Myalgia         Current Outpatient Medications:     acetaminophen (TYLENOL) 500 MG tablet, Take 1 tablet by mouth Every 6 (Six) Hours As Needed for Mild Pain., Disp: , Rfl:     aspirin 81 MG EC tablet, Take 1 tablet by mouth Daily., Disp: 90 tablet, Rfl: 1    atorvastatin (LIPITOR) 10 MG tablet, Take 1 tablet by mouth Daily., Disp: 90 tablet, Rfl: 3    carbidopa-levodopa (SINEMET)  MG per tablet, Take 1 tablet by mouth Daily., Disp: , Rfl:      "cloNIDine (CATAPRES) 0.1 MG tablet, Take 1 tablet by mouth 2 (Two) Times a Day As Needed for High Blood Pressure (Systolic blood pressure consistently above 180)., Disp: 6 tablet, Rfl: 0    coenzyme Q10 100 MG capsule, Take 1 capsule by mouth Daily., Disp: , Rfl:     gabapentin (NEURONTIN) 300 MG capsule, Take 1 capsule by mouth 3 (Three) Times a Day As Needed (Leg pain)., Disp: 90 capsule, Rfl: 5    losartan (COZAAR) 50 MG tablet, Take 1 tablet by mouth Daily., Disp: 90 tablet, Rfl: 3    metoprolol tartrate (LOPRESSOR) 25 MG tablet, TAKE 1 TABLET BY MOUTH EVERY 12 HOURS, Disp: 180 tablet, Rfl: 1    The following portions of the patient's history were reviewed and updated as appropriate: allergies, current medications, past family history, past medical history, past social history, past surgical history and problem list.    Review of Systems   Constitutional: Negative.   Cardiovascular:  Negative for chest pain, dyspnea on exertion, leg swelling, palpitations and syncope.   Respiratory: Negative.  Negative for shortness of breath.    Hematologic/Lymphatic: Negative for bleeding problem. Does not bruise/bleed easily.   Skin:  Negative for rash.   Musculoskeletal:  Negative for muscle weakness and myalgias.   Gastrointestinal:  Negative for heartburn, nausea and vomiting.   Neurological:  Negative for dizziness, light-headedness, loss of balance and numbness.          Objective:     Vitals:    11/06/23 1103   BP: 126/64   BP Location: Left arm   Patient Position: Sitting   Cuff Size: Adult   Pulse: 55   SpO2: 99%   Weight: 82.9 kg (182 lb 12.8 oz)   Height: 167.6 cm (66\")         Vitals reviewed.   Constitutional:       Appearance: Well-developed and not in distress.   Neck:      Thyroid: No thyromegaly.      Vascular: No carotid bruit or JVD.   Pulmonary:      Breath sounds: Normal breath sounds.   Cardiovascular:      Regular rhythm.      No gallop. No S3 and S4 gallop.   Pulses:     Intact distal pulses.      " Carotid: 2+ bilaterally.     Radial: 2+ bilaterally.  Edema:     Peripheral edema absent.   Abdominal:      General: Bowel sounds are normal.      Palpations: Abdomen is soft. There is no abdominal mass.      Tenderness: There is no abdominal tenderness.   Musculoskeletal:         General: No deformity.      Extremities: No clubbing present.Skin:     General: Skin is warm and dry.      Findings: No rash.   Neurological:      Mental Status: Alert and oriented to person, place, and time.         Lab Review:  Lab Results   Component Value Date    GLUCOSE 103 (H) 07/10/2023    BUN 14 07/10/2023    CREATININE 0.94 07/10/2023    BCR 14.9 07/10/2023    K 4.8 07/10/2023    CO2 25.6 07/10/2023    CALCIUM 10.0 07/10/2023    ALBUMIN 4.4 07/10/2023    ALKPHOS 68 07/10/2023    AST 19 07/10/2023    ALT 13 07/10/2023     Lab Results   Component Value Date    CHOL 147 07/10/2023    TRIG 132 07/10/2023    HDL 42 07/10/2023    LDL 82 07/10/2023      Lab Results   Component Value Date    WBC 6.83 07/10/2023    RBC 5.32 07/10/2023    HGB 14.9 07/10/2023    HCT 43.7 07/10/2023    MCV 82.1 07/10/2023     07/10/2023     Lab Results   Component Value Date    TSH 3.080 01/04/2023       Procedures      Advance Care Planning   ACP discussion was held with the patient during this visit. Patient has an advance directive (not in EMR), copy requested.           Assessment:   Diagnoses and all orders for this visit:    1. Coronary artery disease involving native coronary artery of native heart without angina pectoris (Primary)    2. Essential hypertension    3. Hyperlipidemia LDL goal <70        Impression:  1. Coronary artery disease. Stable without angina on current activity. Discontinue Plavix 75 mg daily. Continue on aspirin 81 mg for antiplatelet therapy.    2. Essential hypertension. Well controlled. Continue on losartan 50 mg daily for hypertension. Continue on metoprolol 25 mg BID for rate control and hypertension.     3.  Hyperlipidemia LDL goal <70. Elevated. Continue on atorvastatin 10 mg daily for hyperlipidemia.  Mild myalgias, but will continue low-dose statin.  Last LDL was less than 70.    Plan:  Stable cardiac status.   Discontinue Plavix 75 mg daily.   Continue current medications.  Revisit in 12 MO, or sooner as needed.          Scribed for Stef Ugarte MD by Gauri Edmondson. 11/6/2023 11:27 EST  Stef Ugarte MD    Please note that portions of this note may have been completed with a voice recognition program. Efforts were made to edit the dictations, but occasionally words are mistranscribed.

## 2023-11-06 ENCOUNTER — OFFICE VISIT (OUTPATIENT)
Dept: CARDIOLOGY | Facility: CLINIC | Age: 78
End: 2023-11-06
Payer: MEDICARE

## 2023-11-06 VITALS
WEIGHT: 182.8 LBS | BODY MASS INDEX: 29.38 KG/M2 | SYSTOLIC BLOOD PRESSURE: 126 MMHG | OXYGEN SATURATION: 99 % | HEIGHT: 66 IN | HEART RATE: 55 BPM | DIASTOLIC BLOOD PRESSURE: 64 MMHG

## 2023-11-06 DIAGNOSIS — I25.110 CORONARY ARTERY DISEASE INVOLVING NATIVE CORONARY ARTERY OF NATIVE HEART WITH UNSTABLE ANGINA PECTORIS: ICD-10-CM

## 2023-11-06 DIAGNOSIS — E78.5 HYPERLIPIDEMIA LDL GOAL <70: ICD-10-CM

## 2023-11-06 DIAGNOSIS — I10 ESSENTIAL HYPERTENSION: ICD-10-CM

## 2023-11-06 DIAGNOSIS — I25.10 CORONARY ARTERY DISEASE INVOLVING NATIVE CORONARY ARTERY OF NATIVE HEART WITHOUT ANGINA PECTORIS: Primary | ICD-10-CM

## 2023-11-06 PROCEDURE — 1160F RVW MEDS BY RX/DR IN RCRD: CPT | Performed by: INTERNAL MEDICINE

## 2023-11-06 PROCEDURE — 3074F SYST BP LT 130 MM HG: CPT | Performed by: INTERNAL MEDICINE

## 2023-11-06 PROCEDURE — 1159F MED LIST DOCD IN RCRD: CPT | Performed by: INTERNAL MEDICINE

## 2023-11-06 PROCEDURE — 99213 OFFICE O/P EST LOW 20 MIN: CPT | Performed by: INTERNAL MEDICINE

## 2023-11-06 PROCEDURE — 3078F DIAST BP <80 MM HG: CPT | Performed by: INTERNAL MEDICINE

## 2023-11-27 ENCOUNTER — TELEPHONE (OUTPATIENT)
Dept: INTERNAL MEDICINE | Facility: CLINIC | Age: 78
End: 2023-11-27

## 2023-11-27 ENCOUNTER — HOSPITAL ENCOUNTER (OUTPATIENT)
Dept: GENERAL RADIOLOGY | Facility: HOSPITAL | Age: 78
Discharge: HOME | End: 2023-11-27
Admitting: INTERNAL MEDICINE
Payer: MEDICARE

## 2023-11-27 ENCOUNTER — OFFICE VISIT (OUTPATIENT)
Dept: INTERNAL MEDICINE | Facility: CLINIC | Age: 78
End: 2023-11-27
Payer: MEDICARE

## 2023-11-27 VITALS
TEMPERATURE: 97.8 F | DIASTOLIC BLOOD PRESSURE: 76 MMHG | RESPIRATION RATE: 16 BRPM | HEART RATE: 64 BPM | WEIGHT: 180.13 LBS | SYSTOLIC BLOOD PRESSURE: 124 MMHG | BODY MASS INDEX: 29.07 KG/M2

## 2023-11-27 DIAGNOSIS — R07.81 RIB PAIN ON RIGHT SIDE: Primary | ICD-10-CM

## 2023-11-27 PROCEDURE — 71101 X-RAY EXAM UNILAT RIBS/CHEST: CPT

## 2023-11-27 PROCEDURE — 99213 OFFICE O/P EST LOW 20 MIN: CPT | Performed by: INTERNAL MEDICINE

## 2023-11-27 PROCEDURE — 3074F SYST BP LT 130 MM HG: CPT | Performed by: INTERNAL MEDICINE

## 2023-11-27 PROCEDURE — 1160F RVW MEDS BY RX/DR IN RCRD: CPT | Performed by: INTERNAL MEDICINE

## 2023-11-27 PROCEDURE — 3078F DIAST BP <80 MM HG: CPT | Performed by: INTERNAL MEDICINE

## 2023-11-27 PROCEDURE — 1159F MED LIST DOCD IN RCRD: CPT | Performed by: INTERNAL MEDICINE

## 2023-11-27 RX ORDER — TRAMADOL HYDROCHLORIDE 50 MG/1
TABLET ORAL
Qty: 30 TABLET | Refills: 0 | Status: SHIPPED | OUTPATIENT
Start: 2023-11-27

## 2023-11-27 RX ORDER — TRAMADOL HYDROCHLORIDE 50 MG/1
TABLET ORAL
Qty: 30 TABLET | Refills: 0 | Status: SHIPPED | OUTPATIENT
Start: 2023-11-27 | End: 2023-11-27 | Stop reason: SDUPTHER

## 2023-11-27 NOTE — PROGRESS NOTES
John Ellis is a 78 y.o. male.     Chief Complaint   Patient presents with    Rib Injury     Right    Shoulder Pain     Right       History obtained from the patient.      Rib Injury  This is a new problem. Episode onset: 8 days ago, fell off a chair from a height of about 3 feet, hitting both sides of the diaphragm on top of the chair. The problem occurs intermittently. The problem has been gradually improving. Associated symptoms include arthralgias (shoulders, better with CoQ10) and myalgias (upper arms, better with CoQ10). Pertinent negatives include no chest pain, chills, coughing, fever, neck pain or numbness (and no tingling). Associated symptoms comments: Has bilateral rib pain, R>L, no bruising or swelling    . Exacerbated by: movement or pressing on the area. He has tried heat and acetaminophen (and Aspercreme.  He is also on Gabapentin for RLS.) for the symptoms. The treatment provided moderate relief.        The following portions of the patient's history were reviewed and updated as appropriate: allergies, current medications, past family history, past medical history, past social history, past surgical history, and problem list.      Review of Systems   Constitutional:  Negative for chills and fever.   Respiratory:  Negative for cough and shortness of breath.    Cardiovascular:  Negative for chest pain.   Musculoskeletal:  Positive for arthralgias (shoulders, better with CoQ10) and myalgias (upper arms, better with CoQ10). Negative for back pain and neck pain.   Neurological:  Negative for numbness (and no tingling).           Objective     Blood pressure 124/76, pulse 64, temperature 97.8 °F (36.6 °C), temperature source Infrared, resp. rate 16, weight 81.7 kg (180 lb 2 oz).    Physical Exam  Vitals and nursing note reviewed.   Constitutional:       Comments: BMI greater than 25   Cardiovascular:      Rate and Rhythm: Normal rate and regular rhythm.      Heart sounds: Normal heart  sounds. No murmur heard.  Pulmonary:      Effort: Pulmonary effort is normal.      Breath sounds: Normal breath sounds.      Comments: There is tenderness to palpation over the right lower lateral rib area.  No bruising or edema.  Skin:     Findings: No rash.   Neurological:      Mental Status: He is alert.   Psychiatric:         Mood and Affect: Mood normal.           Assessment & Plan   Diagnoses and all orders for this visit:    1. Rib pain on right side (Primary)  -     XR Ribs Right With PA Chest  -     traMADol (ULTRAM) 50 MG tablet; 1/2 po every 6 hours prn pain  Dispense: 30 tablet; Refill: 0  Continue Tylenol, heat, and topical cream.   Continue Gabapentin.    Return if symptoms worsen or fail to improve.

## 2023-11-27 NOTE — TELEPHONE ENCOUNTER
Caller: Jasiel Ellis    Relationship: Self    Best call back number: [phone]     Caller requesting test results: YES    What test was performed: XRAY    When was the test performed: 11.27.23    Where was the test performed: OFFICE    Additional notes: STATED OK TO SEND MESSAGE REGARDING RESULTS VIA [employer].

## 2023-11-27 NOTE — TELEPHONE ENCOUNTER
Pharmacy Name: [facility]    Pharmacy phone number: [phone]    What medication are you calling in regards to: TRAMADOL    What question does the pharmacy have: NEEDS A DIFFERENT DOSAGE 50 MG IS TOO HIGH    Who is the provider that prescribed the medication: DR. LEONARD WYATT    Additional notes:

## 2023-11-27 NOTE — TELEPHONE ENCOUNTER
X-rays showed:Acute mildly displaced right anterior eighth, ninth, and 10th rib fractures. No evidence of segmental fracture.  Recommend medication as prescribed at his appointment today.  Please remind him it can be up to 12 weeks before the pain completely resolves.

## 2023-11-28 NOTE — TELEPHONE ENCOUNTER
Called patient and read providers message, good verb given.  He stated that he is not currently having any pain. I advised him that if his symptoms worsen or if he had any questions or concerns to call our office.

## 2024-01-10 ENCOUNTER — OFFICE VISIT (OUTPATIENT)
Dept: INTERNAL MEDICINE | Facility: CLINIC | Age: 79
End: 2024-01-10
Payer: MEDICARE

## 2024-01-10 VITALS
BODY MASS INDEX: 29.38 KG/M2 | DIASTOLIC BLOOD PRESSURE: 80 MMHG | SYSTOLIC BLOOD PRESSURE: 148 MMHG | HEART RATE: 68 BPM | WEIGHT: 182 LBS | TEMPERATURE: 97.8 F | RESPIRATION RATE: 18 BRPM

## 2024-01-10 DIAGNOSIS — N40.0 BENIGN PROSTATIC HYPERPLASIA, UNSPECIFIED WHETHER LOWER URINARY TRACT SYMPTOMS PRESENT: ICD-10-CM

## 2024-01-10 DIAGNOSIS — K63.5 BENIGN COLON POLYP: ICD-10-CM

## 2024-01-10 DIAGNOSIS — E78.5 HYPERLIPIDEMIA LDL GOAL <70: ICD-10-CM

## 2024-01-10 DIAGNOSIS — K21.00 GASTROESOPHAGEAL REFLUX DISEASE WITH ESOPHAGITIS, UNSPECIFIED WHETHER HEMORRHAGE: ICD-10-CM

## 2024-01-10 DIAGNOSIS — F51.01 PRIMARY INSOMNIA: ICD-10-CM

## 2024-01-10 DIAGNOSIS — E04.1 SOLITARY NODULE OF RIGHT LOBE OF THYROID: ICD-10-CM

## 2024-01-10 DIAGNOSIS — I25.10 CORONARY ARTERY DISEASE INVOLVING NATIVE CORONARY ARTERY OF NATIVE HEART WITHOUT ANGINA PECTORIS: ICD-10-CM

## 2024-01-10 DIAGNOSIS — Z12.5 SCREENING FOR PROSTATE CANCER: ICD-10-CM

## 2024-01-10 DIAGNOSIS — G25.81 RESTLESS LEGS SYNDROME (RLS): ICD-10-CM

## 2024-01-10 DIAGNOSIS — I10 PRIMARY HYPERTENSION: Primary | ICD-10-CM

## 2024-01-10 LAB
ALBUMIN SERPL-MCNC: 4.6 G/DL (ref 3.5–5.2)
ALBUMIN/GLOB SERPL: 1.6 G/DL
ALP SERPL-CCNC: 77 U/L (ref 39–117)
ALT SERPL W P-5'-P-CCNC: 21 U/L (ref 1–41)
ANION GAP SERPL CALCULATED.3IONS-SCNC: 12 MMOL/L (ref 5–15)
AST SERPL-CCNC: 17 U/L (ref 1–40)
BASOPHILS # BLD AUTO: 0.05 10*3/MM3 (ref 0–0.2)
BASOPHILS NFR BLD AUTO: 0.5 % (ref 0–1.5)
BILIRUB BLD-MCNC: NEGATIVE MG/DL
BILIRUB SERPL-MCNC: 0.4 MG/DL (ref 0–1.2)
BUN SERPL-MCNC: 19 MG/DL (ref 8–23)
BUN/CREAT SERPL: 19 (ref 7–25)
CALCIUM SPEC-SCNC: 10.1 MG/DL (ref 8.6–10.5)
CHLORIDE SERPL-SCNC: 99 MMOL/L (ref 98–107)
CHOLEST SERPL-MCNC: 188 MG/DL (ref 0–200)
CLARITY, POC: CLEAR
CO2 SERPL-SCNC: 26 MMOL/L (ref 22–29)
COLOR UR: YELLOW
CREAT SERPL-MCNC: 1 MG/DL (ref 0.76–1.27)
DEPRECATED RDW RBC AUTO: 38.4 FL (ref 37–54)
EGFRCR SERPLBLD CKD-EPI 2021: 78 ML/MIN/1.73
EOSINOPHIL # BLD AUTO: 0.26 10*3/MM3 (ref 0–0.4)
EOSINOPHIL NFR BLD AUTO: 2.8 % (ref 0.3–6.2)
ERYTHROCYTE [DISTWIDTH] IN BLOOD BY AUTOMATED COUNT: 12.9 % (ref 12.3–15.4)
EXPIRATION DATE: NORMAL
GLOBULIN UR ELPH-MCNC: 2.9 GM/DL
GLUCOSE SERPL-MCNC: 106 MG/DL (ref 65–99)
GLUCOSE UR STRIP-MCNC: NEGATIVE MG/DL
HCT VFR BLD AUTO: 43.1 % (ref 37.5–51)
HDLC SERPL-MCNC: 43 MG/DL (ref 40–60)
HGB BLD-MCNC: 14.5 G/DL (ref 13–17.7)
IMM GRANULOCYTES # BLD AUTO: 0.04 10*3/MM3 (ref 0–0.05)
IMM GRANULOCYTES NFR BLD AUTO: 0.4 % (ref 0–0.5)
KETONES UR QL: NEGATIVE
LDLC SERPL CALC-MCNC: 112 MG/DL (ref 0–100)
LDLC/HDLC SERPL: 2.5 {RATIO}
LEUKOCYTE EST, POC: NEGATIVE
LYMPHOCYTES # BLD AUTO: 2.26 10*3/MM3 (ref 0.7–3.1)
LYMPHOCYTES NFR BLD AUTO: 24.5 % (ref 19.6–45.3)
Lab: NORMAL
MCH RBC QN AUTO: 27.9 PG (ref 26.6–33)
MCHC RBC AUTO-ENTMCNC: 33.6 G/DL (ref 31.5–35.7)
MCV RBC AUTO: 83 FL (ref 79–97)
MONOCYTES # BLD AUTO: 0.99 10*3/MM3 (ref 0.1–0.9)
MONOCYTES NFR BLD AUTO: 10.7 % (ref 5–12)
NEUTROPHILS NFR BLD AUTO: 5.61 10*3/MM3 (ref 1.7–7)
NEUTROPHILS NFR BLD AUTO: 61.1 % (ref 42.7–76)
NITRITE UR-MCNC: NEGATIVE MG/ML
NRBC BLD AUTO-RTO: 0 /100 WBC (ref 0–0.2)
PH UR: 5 [PH] (ref 5–8)
PLATELET # BLD AUTO: 199 10*3/MM3 (ref 140–450)
PMV BLD AUTO: 10.8 FL (ref 6–12)
POTASSIUM SERPL-SCNC: 4.5 MMOL/L (ref 3.5–5.2)
PROT SERPL-MCNC: 7.5 G/DL (ref 6–8.5)
PROT UR STRIP-MCNC: NEGATIVE MG/DL
PSA SERPL-MCNC: 1.02 NG/ML (ref 0–4)
RBC # BLD AUTO: 5.19 10*6/MM3 (ref 4.14–5.8)
RBC # UR STRIP: NEGATIVE /UL
SODIUM SERPL-SCNC: 137 MMOL/L (ref 136–145)
SP GR UR: 1.02 (ref 1–1.03)
TRIGL SERPL-MCNC: 188 MG/DL (ref 0–150)
TSH SERPL DL<=0.05 MIU/L-ACNC: 6.63 UIU/ML (ref 0.27–4.2)
UROBILINOGEN UR QL: NORMAL
VLDLC SERPL-MCNC: 33 MG/DL (ref 5–40)
WBC NRBC COR # BLD AUTO: 9.21 10*3/MM3 (ref 3.4–10.8)

## 2024-01-10 PROCEDURE — G0103 PSA SCREENING: HCPCS | Performed by: INTERNAL MEDICINE

## 2024-01-10 PROCEDURE — 80053 COMPREHEN METABOLIC PANEL: CPT | Performed by: INTERNAL MEDICINE

## 2024-01-10 PROCEDURE — 3077F SYST BP >= 140 MM HG: CPT | Performed by: INTERNAL MEDICINE

## 2024-01-10 PROCEDURE — 85025 COMPLETE CBC W/AUTO DIFF WBC: CPT | Performed by: INTERNAL MEDICINE

## 2024-01-10 PROCEDURE — 80061 LIPID PANEL: CPT | Performed by: INTERNAL MEDICINE

## 2024-01-10 PROCEDURE — 1160F RVW MEDS BY RX/DR IN RCRD: CPT | Performed by: INTERNAL MEDICINE

## 2024-01-10 PROCEDURE — 81003 URINALYSIS AUTO W/O SCOPE: CPT | Performed by: INTERNAL MEDICINE

## 2024-01-10 PROCEDURE — 84443 ASSAY THYROID STIM HORMONE: CPT | Performed by: INTERNAL MEDICINE

## 2024-01-10 PROCEDURE — 1159F MED LIST DOCD IN RCRD: CPT | Performed by: INTERNAL MEDICINE

## 2024-01-10 PROCEDURE — 36415 COLL VENOUS BLD VENIPUNCTURE: CPT | Performed by: INTERNAL MEDICINE

## 2024-01-10 PROCEDURE — 99214 OFFICE O/P EST MOD 30 MIN: CPT | Performed by: INTERNAL MEDICINE

## 2024-01-10 PROCEDURE — 3079F DIAST BP 80-89 MM HG: CPT | Performed by: INTERNAL MEDICINE

## 2024-01-10 NOTE — PATIENT INSTRUCTIONS
I recommend RSV vaccination, COVID-19 bivalent vaccination, Shingrix (Shingles vaccination) and Hepatitis A vaccination at the pharmacy, as we discussed.    I recommended Melatonin nightly for your sleep issue.    MyPlate from USDA    MyPlate is an outline of a general healthy diet based on the Dietary Guidelines for Americans, 7769-8032, from the U.S. Department of Agriculture (USDA). It sets guidelines for how much food you should eat from each food group based on your age, sex, and level of physical activity.  What are tips for following MyPlate?  To follow MyPlate recommendations:  Eat a wide variety of fruits and vegetables, grains, and protein foods.  Serve smaller portions and eat less food throughout the day.  Limit portion sizes to avoid overeating.  Enjoy your food.  Get at least 150 minutes of exercise every week. This is about 30 minutes each day, 5 or more days per week.  It can be difficult to have every meal look like MyPlate. Think about MyPlate as eating guidelines for an entire day, rather than each individual meal.  Fruits and vegetables  Make one half of your plate fruits and vegetables.  Eat many different colors of fruits and vegetables each day.  For a 2,000-calorie daily food plan, eat:  2½ cups of vegetables every day.  2 cups of fruit every day.  1 cup is equal to:  1 cup raw or cooked vegetables.  1 cup raw fruit.  1 medium-sized orange, apple, or banana.  1 cup 100% fruit or vegetable juice.  2 cups raw leafy greens, such as lettuce, spinach, or kale.  ½ cup dried fruit.  Grains  One fourth of your plate should be grains.  Make at least half of the grains you eat each day whole grains.  For a 2,000-calorie daily food plan, eat 6 oz of grains every day.  1 oz is equal to:  1 slice bread.  1 cup cereal.  ½ cup cooked rice, cereal, or pasta.  Protein  One fourth of your plate should be protein.  Eat a wide variety of protein foods, including meat, poultry, fish, eggs, beans, nuts, and  tofu.  For a 2,000-calorie daily food plan, eat 5½ oz of protein every day.  1 oz is equal to:  1 oz meat, poultry, or fish.  ¼ cup cooked beans.  1 egg.  ½ oz nuts or seeds.  1 Tbsp peanut butter.  Dairy  Drink fat-free or low-fat (1%) milk.  Eat or drink dairy as a side to meals.  For a 2,000-calorie daily food plan, eat or drink 3 cups of dairy every day.  1 cup is equal to:  1 cup milk, yogurt, cottage cheese, or soy milk (soy beverage).  2 oz processed cheese.  1½ oz natural cheese.  Fats, oils, salt, and sugars  Only small amounts of oils are recommended.  Avoid foods that are high in calories and low in nutritional value (empty calories), like foods high in fat or added sugars.  Choose foods that are low in salt (sodium). Choose foods that have less than 140 milligrams (mg) of sodium per serving.  Drink water instead of sugary drinks. Drink enough fluid to keep your urine pale yellow.  Where to find support  Work with your health care provider or a dietitian to develop a customized eating plan that is right for you.  Download an stephany (mobile application) to help you track your daily food intake.  Where to find more information  USDA: ChooseMyPlate.gov  Summary  MyPlate is a general guideline for healthy eating from the USDA. It is based on the Dietary Guidelines for Americans, 6631-5928.  In general, fruits and vegetables should take up one half of your plate, grains should take up one fourth of your plate, and protein should take up one fourth of your plate.  This information is not intended to replace advice given to you by your health care provider. Make sure you discuss any questions you have with your health care provider.  Document Revised: 11/08/2021 Document Reviewed: 11/08/2021  Elsevier Patient Education © 2023 Elsevier Inc.      Exercising to Stay Healthy  To become healthy and stay healthy, it is recommended that you do moderate-intensity and vigorous-intensity exercise. You can tell that you are  exercising at a moderate intensity if your heart starts beating faster and you start breathing faster but can still hold a conversation. You can tell that you are exercising at a vigorous intensity if you are breathing much harder and faster and cannot hold a conversation while exercising.  How can exercise benefit me?  Exercising regularly is important. It has many health benefits, such as:  Improving overall fitness, flexibility, and endurance.  Increasing bone density.  Helping with weight control.  Decreasing body fat.  Increasing muscle strength and endurance.  Reducing stress and tension, anxiety, depression, or anger.  Improving overall health.  What guidelines should I follow while exercising?  Before you start a new exercise program, talk with your health care provider.  Do not exercise so much that you hurt yourself, feel dizzy, or get very short of breath.  Wear comfortable clothes and wear shoes with good support.  Drink plenty of water while you exercise to prevent dehydration or heat stroke.  Work out until your breathing and your heartbeat get faster (moderate intensity).  How often should I exercise?  Choose an activity that you enjoy, and set realistic goals. Your health care provider can help you make an activity plan that is individually designed and works best for you.  Exercise regularly as told by your health care provider. This may include:  Doing strength training two times a week, such as:  Lifting weights.  Using resistance bands.  Push-ups.  Sit-ups.  Yoga.  Doing a certain intensity of exercise for a given amount of time. Choose from these options:  A total of 150 minutes of moderate-intensity exercise every week.  A total of 75 minutes of vigorous-intensity exercise every week.  A mix of moderate-intensity and vigorous-intensity exercise every week.  Children, pregnant women, people who have not exercised regularly, people who are overweight, and older adults may need to talk with a  health care provider about what activities are safe to perform. If you have a medical condition, be sure to talk with your health care provider before you start a new exercise program.  What are some exercise ideas?  Moderate-intensity exercise ideas include:  Walking 1 mile (1.6 km) in about 15 minutes.  Biking.  Hiking.  Golfing.  Dancing.  Water aerobics.  Vigorous-intensity exercise ideas include:  Walking 4.5 miles (7.2 km) or more in about 1 hour.  Jogging or running 5 miles (8 km) in about 1 hour.  Biking 10 miles (16.1 km) or more in about 1 hour.  Lap swimming.  Roller-skating or in-line skating.  Cross-country skiing.  Vigorous competitive sports, such as football, basketball, and soccer.  Jumping rope.  Aerobic dancing.  What are some everyday activities that can help me get exercise?  Yard work, such as:  Pushing a .  Raking and bagging leaves.  Washing your car.  Pushing a stroller.  Shoveling snow.  Gardening.  Washing windows or floors.  How can I be more active in my day-to-day activities?  Use stairs instead of an elevator.  Take a walk during your lunch break.  If you drive, park your car farther away from your work or school.  If you take public transportation, get off one stop early and walk the rest of the way.  Stand up or walk around during all of your indoor phone calls.  Get up, stretch, and walk around every 30 minutes throughout the day.  Enjoy exercise with a friend. Support to continue exercising will help you keep a regular routine of activity.  Where to find more information  You can find more information about exercising to stay healthy from:  U.S. Department of Health and Human Services: www.hhs.gov  Centers for Disease Control and Prevention (CDC): www.cdc.gov  Summary  Exercising regularly is important. It will improve your overall fitness, flexibility, and endurance.  Regular exercise will also improve your overall health. It can help you control your weight, reduce  stress, and improve your bone density.  Do not exercise so much that you hurt yourself, feel dizzy, or get very short of breath.  Before you start a new exercise program, talk with your health care provider.  This information is not intended to replace advice given to you by your health care provider. Make sure you discuss any questions you have with your health care provider.  Document Revised: 04/15/2022 Document Reviewed: 04/15/2022  Elsevier Patient Education © 2023 [employer]        Advance Care Planning and Advance Directives     You make decisions on a daily basis - decisions about where you want to live, your career, your home, your life. Perhaps one of the most important decisions you face is your choice for future medical care. Take time to talk with your family and your healthcare team and start planning today.  Advance Care Planning is a process that can help you:  Understand possible future healthcare decisions in light of your own experiences  Reflect on those decision in light of your goals and values  Discuss your decisions with those closest to you and the healthcare professionals that care for you  Make a plan by creating a document that reflects your wishes    Surrogate Decision Maker  In the event of a medical emergency, which has left you unable to communicate or to make your own decisions, you would need someone to make decisions for you.  It is important to discuss your preferences for medical treatment with this person while you are in good health.     Qualities of a surrogate decision maker:  Willing to take on this role and responsibility  Knows what you want for future medical care  Willing to follow your wishes even if they don't agree with them  Able to make difficult medical decisions under stressful circumstances    Advance Directives  These are legal documents you can create that will guide your healthcare team and decision maker(s) when needed. These documents can be stored in the  electronic medical record.    Living Will - a legal document to guide your care if you have a terminal condition or a serious illness and are unable to communicate. States vary by statute in document names/types, but most forms may include one or more of the following:        -  Directions regarding life-prolonging treatments        -  Directions regarding artificially provided nutrition/hydration        -  Choosing a healthcare decision maker        -  Direction regarding organ/tissue donation    Durable Power of  for Healthcare - this document names an -in-fact to make medical decisions for you, but it may also allow this person to make personal and financial decisions for you. Please seek the advice of an  if you need this type of document.    **Advance Directives are not required and no one may discriminate against you if you do not sign one.    Medical Orders  Many states allow specific forms/orders signed by your physician to record your wishes for medical treatment in your current state of health. This form, signed in personal communication with your physician, addresses resuscitation and other medical interventions that you may or may not want.      For more information or to schedule a time with a [facility] Advance Care Planning Facilitator contact: [facility] or call [phone] and someone will contact you directly.    Sit-to-Stand Exercise    The sit-to-stand exercise (also known as the chair stand or chair rise exercise) strengthens your lower body and helps you maintain or improve your mobility and independence. The end goal is to do the sit-to-stand exercise without using your hands. This will be easier as you become stronger. You should always talk with your health care provider before starting any exercise program, especially if you have had recent surgery.  Do the exercise exactly as told by your health care provider and adjust it as directed. It is normal  to feel mild stretching, pulling, tightness, or discomfort as you do this exercise, but you should stop right away if you feel sudden pain or your pain gets worse. Do not begin doing this exercise until told by your health care provider.  What the sit-to-stand exercise does  The sit-to-stand exercise helps to strengthen the muscles in your thighs and the muscles in the center of your body that give you stability (core muscles). This exercise is especially helpful if:  You have had knee or hip surgery.  You have trouble getting up from a chair, out of a car, or off the toilet due to muscle weakness.  How to do the sit-to-stand exercise  Sit toward the front edge of a sturdy chair without armrests. Your knees should be bent and your feet should be flat on the floor and shoulder-width apart and underneath your hips.  Place your hands lightly on each side of the seat. Keep your back and neck as straight as possible, with your chest slightly forward.  Breathe in slowly. Lean forward and slightly shift your weight to the front of your feet.  Breathe out as you slowly stand up. Try not to support any weight with your hands.  Stand and pause for a full breath in and out.  Breathe in as you sit down slowly. Tighten your core and abdominal muscles to control your lowering as much as possible. You should lower yourself back to the chair slowly, not just drop back into the seat.  Breathe out slowly.  Do this exercise 10-15 times. If needed, do it fewer times until you build up strength.  Rest for 1 minute, then do another set of 10-15 repetitions.  To change the difficulty of the sit-to-stand exercise  If the exercise is too difficult, use a chair with sturdy armrests, and push off the armrests to help you come to the standing position. You can also use the armrests to help slowly lower yourself back to sitting. As this gets easier, try to use your arms less. You can also place a firm cushion or pillow on the chair to make the  surface higher.  If this exercise is too easy, do not use your arms to help raise or lower yourself. You can also wear a weighted vest, use hand weights, increase your repetitions, or try a lower chair.  General tips  You may feel tired when starting an exercise routine. This is normal.  You may have muscle soreness that lasts a few days. This is normal. As you get stronger, you may not feel muscle soreness.  Use smooth, steady movements.  Do not  hold your breath during strength exercises. This can cause unsafe changes in your blood pressure.  Breathe in slowly through your nose, and breathe out slowly through your mouth.  Summary  Strengthening your lower body is an important step to help you move safely and independently.  The sit-to-stand exercise helps strengthen the muscles in your thighs and core.  You should always talk with your health care provider before starting any exercise program, especially if you have had recent surgery.  This information is not intended to replace advice given to you by your health care provider. Make sure you discuss any questions you have with your health care provider.  Document Revised: 04/10/2022 Document Reviewed: 04/10/2022  Elsevier Patient Education © 2023 [employer]      Fall Prevention in the Home, Adult  Falls can cause injuries and affect people of all ages. There are many simple things that you can do to make your home safe and to help prevent falls. Ask for help when making these changes, if needed.  What actions can I take to prevent falls?  General instructions  Use good lighting in all rooms. Replace any light bulbs that burn out, turn on lights if it is dark, and use night-lights.  Place frequently used items in easy-to-reach places. Lower the shelves around your home if necessary.  Set up furniture so that there are clear paths around it. Avoid moving your furniture around.  Remove throw rugs and other tripping hazards from the floor.  Avoid walking on wet  floors.  Fix any uneven floor surfaces.  Add color or contrast paint or tape to grab bars and handrails in your home. Place contrasting color strips on the first and last steps of staircases.  When you use a stepladder, make sure that it is completely opened and that the sides and supports are firmly locked. Have someone hold the ladder while you are using it. Do not climb a closed stepladder.  Know where your pets are when moving through your home.  What can I do in the bathroom?         Keep the floor dry. Immediately clean up any water that is on the floor.  Remove soap buildup in the tub or shower regularly.  Use nonskid mats or decals on the floor of the tub or shower.  Attach bath mats securely with double-sided, nonslip rug tape.  If you need to sit down while you are in the shower, use a plastic, nonslip stool.  Install grab bars by the toilet and in the tub and shower. Do not use towel bars as grab bars.  What can I do in the bedroom?  Make sure that a bedside light is easy to reach.  Do not use oversized bedding that reaches the floor.  Have a firm chair that has side arms to use for getting dressed.  What can I do in the kitchen?  Clean up any spills right away.  If you need to reach for something above you, use a sturdy step stool that has a grab bar.  Keep electrical cables out of the way.  Do not use floor polish or wax that makes floors slippery. If you must use wax, make sure that it is non-skid floor wax.  What can I do with my stairs?  Do not leave any items on the stairs.  Make sure that you have a light switch at the top and the bottom of the stairs. Have them installed if you do not have them.  Make sure that there are handrails on both sides of the stairs. Fix handrails that are broken or loose. Make sure that handrails are as long as the staircases.  Install non-slip stair treads on all stairs in your home.  Avoid having throw rugs at the top or bottom of stairs, or secure the rugs with carpet  tape to prevent them from moving.  Choose a carpet design that does not hide the edge of steps on the stairs.  Check any carpeting to make sure that it is firmly attached to the stairs. Fix any carpet that is loose or worn.  What can I do on the outside of my home?  Use bright outdoor lighting.  Regularly repair the edges of walkways and driveways and fix any cracks.  Remove high doorway thresholds.  Trim any shrubbery on the main path into your home.  Regularly check that handrails are securely fastened and in good repair. Both sides of all steps should have handrails.  Install guardrails along the edges of any raised decks or porches.  Clear walkways of debris and clutter, including tools and rocks.  Have leaves, snow, and ice cleared regularly.  Use sand or salt on walkways during winter months.  In the garage, clean up any spills right away, including grease or oil spills.  What other actions can I take?  Wear closed-toe shoes that fit well and support your feet. Wear shoes that have rubber soles or low heels.  Use mobility aids as needed, such as canes, walkers, scooters, and crutches.  Review your medicines with your health care provider. Some medicines can cause dizziness or changes in blood pressure, which increase your risk of falling.  Talk with your health care provider about other ways that you can decrease your risk of falls. This may include working with a physical therapist or  to improve your strength, balance, and endurance.  Where to find more information  Centers for Disease Control and Prevention, STEADI: www.cdc.gov  [employer] Aging: www.fiordaliza.nih.gov  Contact a health care provider if:  You are afraid of falling at home.  You feel weak, drowsy, or dizzy at home.  You fall at home.  Summary  There are many simple things that you can do to make your home safe and to help prevent falls.  Ways to make your home safe include removing tripping hazards and installing grab bars in the  bathroom.  Ask for help when making these changes in your home.  This information is not intended to replace advice given to you by your health care provider. Make sure you discuss any questions you have with your health care provider.  Document Revised: 09/19/2022 Document Reviewed: 07/21/2021  [place] Patient Education © 2023 [employer] Inc.

## 2024-01-10 NOTE — PROGRESS NOTES
John Ellis is a 78 y.o. male.     Chief Complaint   Patient presents with    Hypertension     6 month follow up chronic medical problems    Hyperlipidemia       History obtained from the patient.      History of Present Illness     The patient was seen 11/27/2023 for rib pain.  X-rays showed mildly displaced right anterior eighth, ninth, and tenth ribs.  He is on Tramadol, and states the pain is almost completely resolved.    The patient has seen Dr. Blank for a right-sided Thyroid Nodule.  Ultrasound 2019 showed the nodule to be calcified, so biopsy was not recommended.  Thyroid ultrasound stable (mildly suspicious) 1/22/21.  Last ultrasound was on 2/9/22 : Benign colloid cyst within the lower pole the right lobe of the thyroid gland.  No suspicious solid thyroid nodule identified.. On 1/4/23, TSH was normal. He denies thyroid symptoms.      The patient sees Dr. Jorgensen as needed for Restless Leg Syndrome, stable on Gabapentin and Sinemet. Last visit was 11/8/23.     The patient sees Dr. Francisco, Urology, once per year, for BPH, last visit 1/26/2023.  PSA was not done.  His last PSA done here on 1/27/2022 was normal (0.854).  He is not on medication.  He denies urinary symptoms.  He is now on an every other year visit/PSA schedule.     Cardiac Follow-up: The patient is here for a follow-up visit.       His Hypertension has been stable.    Medications: Metoprolol and Losartan.  He is on Clonidine as needed.  His Hyperlipidemia has been stable.    LDL goal is < 70.  Last LDL 82, .  Medication: Atorvastatin and Co-Q10.   His Coronary Artery Disease (diagnosed in May 2022. NSTEMI 5/20/22) has been stable.  Medication: Metoprolol, Atorvastatin, Plavix, Aspirin, and Losartan.  Side Effects:  Myalgias.     Procedures: On 5/20/2022, 2D Echocardiogram was normal, EF 60%.  On 5/20/2022 cardiac catheterization showed LVEF 50%, 90% RCA right posterolateral artery stenosis treated 2.5 x 12 Xience  TABITHA, 40-50% mid RCA with normal IFR, 30% proximal LAD, Chronically occluded first diagonal with 3-4+ collaterals.     Interval Events:  He last saw Dr. Ugarte on 11/6/2023, and Plavix was discontinued.  No other medication changes.  He states his blood pressure at home has been 120's / 50's.       Symptoms: Has occasional lightheadedness and dizziness.  Blood pressure is low.  Denies chest pain, shortness of breath, AQUINO, orthopnea, PND, palpitations, syncope, lower extremity edema, and claudication.    Associated Symptoms: Weight up 3 pounds in the past 6 months.  Reports myalgias (muscles in the bilateral hip and shoulder areas), but no actual arthralgias.  Denies fatigue, headache, polyuria, polydipsia, visual impairment, memory loss, concentration problems.     Lifestyle: The patient follows a diverse and healthy diet overall but worse during the holidays. He walks twice daily.  He is active overall.  He states he stopped Atorvastatin a few days ago due to myalgias.  Tobacco Use:  Former Smoker.     Colon Polyps Follow-up: The patient is here for follow-up of Colon Polyps which has been stable.  Family History: Brother diagnosed with Stage IV Colon Cancer in his 40s.    Interval Events: Last Colonoscopy was done 1/17/2023, normal..  Symptoms: Denies abdominal pain, diarrhea, constipation, hematochezia, melena, or changes in the stool.    Medication: None.      Current Outpatient Medications on File Prior to Visit   Medication Sig Dispense Refill    acetaminophen (TYLENOL) 500 MG tablet Take 1 tablet by mouth Every 6 (Six) Hours As Needed for Mild Pain.      aspirin 81 MG EC tablet Take 1 tablet by mouth Daily. 90 tablet 1    carbidopa-levodopa (SINEMET)  MG per tablet Take 1 tablet by mouth Daily.      cloNIDine (CATAPRES) 0.1 MG tablet Take 1 tablet by mouth 2 (Two) Times a Day As Needed for High Blood Pressure (Systolic blood pressure consistently above 180). 6 tablet 0    coenzyme Q10 100 MG capsule Take  1 capsule by mouth Daily.      gabapentin (NEURONTIN) 300 MG capsule Take 1 capsule by mouth 3 (Three) Times a Day As Needed (Leg pain). 90 capsule 5    losartan (COZAAR) 50 MG tablet Take 1 tablet by mouth Daily. 90 tablet 3    metoprolol tartrate (LOPRESSOR) 25 MG tablet TAKE 1 TABLET BY MOUTH EVERY 12 HOURS 180 tablet 3    traMADol (ULTRAM) 50 MG tablet 1/2 po every 6 hours prn pain 30 tablet 0    [DISCONTINUED] atorvastatin (LIPITOR) 10 MG tablet Take 1 tablet by mouth Daily. 90 tablet 3     No current facility-administered medications on file prior to visit.       Current outpatient and discharge medications have been reconciled for the patient.  Reviewed by: Salome Knott MD        The following portions of the patient's history were reviewed and updated as appropriate: allergies, current medications, past family history, past medical history, past social history, past surgical history, and problem list.    Review of Systems   Constitutional:  Negative for fatigue and unexpected weight change.   Eyes:  Negative for visual disturbance.   Respiratory:  Negative for cough, shortness of breath and wheezing.    Cardiovascular:  Negative for chest pain, palpitations and leg swelling.        No AQUINO, orthopnea, or claudication.   Gastrointestinal:  Negative for abdominal pain, blood in stool, constipation, diarrhea, nausea and vomiting.        Denies melena.   Endocrine: Negative for polydipsia and polyuria.   Musculoskeletal:  Positive for myalgias. Negative for arthralgias.   Neurological:  Negative for dizziness, syncope, light-headedness and headaches.        No memory issues.   Psychiatric/Behavioral:  Positive for sleep disturbance. Negative for decreased concentration.          Objective       Blood pressure 148/80, pulse 68, temperature 97.8 °F (36.6 °C), temperature source Infrared, resp. rate 18, weight 82.6 kg (182 lb).  Body mass index is 29.38 kg/m².    Repeat blood pressure 140/85 (has not taken  medication today).    Physical Exam  Vitals and nursing note reviewed.   Constitutional:       Appearance: He is well-developed.      Comments: BMI greater than 25   Neck:      Thyroid: No thyroid mass or thyromegaly.      Vascular: No carotid bruit.   Cardiovascular:      Rate and Rhythm: Normal rate and regular rhythm.      Pulses: Normal pulses.      Heart sounds: Normal heart sounds. No murmur heard.     No friction rub. No gallop.   Pulmonary:      Effort: Pulmonary effort is normal.      Breath sounds: Normal breath sounds.   Abdominal:      General: Bowel sounds are normal. There is no distension or abdominal bruit.      Palpations: Abdomen is soft. There is no hepatomegaly, splenomegaly or mass.      Tenderness: There is no abdominal tenderness.   Musculoskeletal:      Cervical back: Normal range of motion and neck supple.      Right lower leg: No edema.      Left lower leg: No edema.   Neurological:      Mental Status: He is alert.   Psychiatric:         Mood and Affect: Mood normal.       Advance Care Planning   ACP discussion was held with the patient during this visit. Patient does not have an advance directive, information provided.  ACP information pamphlet given to the patient.  ACP information provided on the AVS.     Results for orders placed or performed in visit on 01/10/24   POC Urinalysis Dipstick, Automated    Specimen: Urine   Result Value Ref Range    Color Yellow Yellow, Straw, Dark Yellow, Jaycee    Clarity, UA Clear Clear    Specific Gravity  1.025 1.005 - 1.030    pH, Urine 5.0 5.0 - 8.0    Leukocytes Negative Negative    Nitrite, UA Negative Negative    Protein, POC Negative Negative mg/dL    Glucose, UA Negative Negative mg/dL    Ketones, UA Negative Negative    Urobilinogen, UA Normal Normal, 0.2 E.U./dL    Bilirubin Negative Negative    Blood, UA Negative Negative    Lot Number [id number]     Expiration Date 7/31/24          Assessment / Plan:  Diagnoses and all orders for this  visit:    1. Primary hypertension (Primary)  -     POC Urinalysis Dipstick, Automated  -     Lipid Panel  -     Comprehensive Metabolic Panel  -     TSH  -     CBC & Differential   Continue current medication(s) as noted in the history of present illness.    2. Hyperlipidemia LDL goal <70  -     Lipid Panel  -     Comprehensive Metabolic Panel  -     TSH  -     CBC & Differential  -     Evolocumab (REPATHA) solution prefilled syringe injection; Inject 1 mL under the skin into the appropriate area as directed Every 14 (Fourteen) Days.  Dispense: 2 mL; Refill: 11- NEW   Atorvastatin discontinued.    3. Coronary artery disease involving native coronary artery of native heart without angina pectoris  -     Lipid Panel  -     Comprehensive Metabolic Panel  -     TSH  -     CBC & Differential   Continue current medication(s) as noted in the history of present illness.    4. Gastroesophageal reflux disease with esophagitis, unspecified whether hemorrhage   Continue current medication(s) as noted in the history of present illness.    5. Benign colon polyp   Colonoscopy up-to-date.    6. Solitary nodule of right lobe of thyroid  -     US Thyroid; Future    7. Benign prostatic hyperplasia, unspecified whether lower urinary tract symptoms present   Stable, no medication.    8. Restless legs syndrome (RLS)   Continue current medication(s) as noted in the history of present illness.    9. Primary insomnia   I recommended Melatonin nightly.    10. Screening for prostate cancer  -     PSA Screen      The patient declined scheduling a CT Lung Cancer Screening today.    Recommended Influenza vaccine, at the pharmacy or in our office. The patient declined.  The patient was informed he could be hospitalized and die from Influenza infection.    I recommended RSV vaccination, COVID-19 bivalent vaccination, Shingrix (Shingles vaccination) and Hepatitis A vaccination at the pharmacy, as we discussed.    The patient declined scheduling a  Medicare Wellness Exam today.      BMI is >= 25 and <30. (Overweight) The following options were offered after discussion;: exercise counseling/recommendations and nutrition counseling/recommendations          Return in about 6 months (around 7/10/2024) for Recheck HTN, fasting.

## 2024-01-11 ENCOUNTER — TELEPHONE (OUTPATIENT)
Dept: INTERNAL MEDICINE | Facility: CLINIC | Age: 79
End: 2024-01-11
Payer: MEDICARE

## 2024-01-11 DIAGNOSIS — R79.89 ELEVATED TSH: Primary | ICD-10-CM

## 2024-01-11 DIAGNOSIS — E78.5 HYPERLIPIDEMIA LDL GOAL <70: ICD-10-CM

## 2024-01-11 NOTE — TELEPHONE ENCOUNTER
Received a [employer] email message for the patient:    DR. Nikos Bolaños:  The new cholesterol medication you prescribed (Repatha) is excessively costly, at more than $500.00. The Pharmacist specified that the prescribing Physician -- e.g. you --needs to request approval from my RX insurance. So sorry for the inconvenience, but if approved, that may lower the cost to a manageable amount.  My new RX insurance is [employer], and the info is:  BIN [id number]  N [id number]  GRP [id number]  The phone # [phone]    Please PA Jose.  He has had significant myalgias with rosuvastatin and atorvastatin in the past.

## 2024-01-12 NOTE — TELEPHONE ENCOUNTER
Filled out [facility] form with the information given below. Waiting for providers signature to fax with last office note.

## 2024-01-12 NOTE — TELEPHONE ENCOUNTER
[facility] sent a fax back for PA of the Repatha and stated that patient has inside rx only.    What is Inside Rx and the Inside Rx Brand Drug Program?    [employer] is a prescription savings program powered by [employer]. [employer] Rx and [employer]Rx have teamed up to deliver greater savings on brand-name drugs! Eligible patients can save an average of 34% off of the typical retail price for select brand-name medications which could translate into savings of over $1,600 per year.    See letter in media

## 2024-01-12 NOTE — TELEPHONE ENCOUNTER
Patient Reported  [facility]   Member ID: [id number]  Contract:   PBP: [id number]  Hope this helps...

## 2024-01-14 ENCOUNTER — HOSPITAL ENCOUNTER (EMERGENCY)
Facility: HOSPITAL | Age: 79
Discharge: HOME | End: 2024-01-14
Attending: STUDENT IN AN ORGANIZED HEALTH CARE EDUCATION/TRAINING PROGRAM | Admitting: STUDENT IN AN ORGANIZED HEALTH CARE EDUCATION/TRAINING PROGRAM
Payer: MEDICARE

## 2024-01-14 VITALS
SYSTOLIC BLOOD PRESSURE: 142 MMHG | DIASTOLIC BLOOD PRESSURE: 85 MMHG | BODY MASS INDEX: 28.56 KG/M2 | HEIGHT: 67 IN | OXYGEN SATURATION: 98 % | WEIGHT: 182 LBS | RESPIRATION RATE: 18 BRPM | TEMPERATURE: 97.5 F | HEART RATE: 63 BPM

## 2024-01-14 DIAGNOSIS — I10 PRIMARY HYPERTENSION: ICD-10-CM

## 2024-01-14 DIAGNOSIS — E87.1 HYPONATREMIA: ICD-10-CM

## 2024-01-14 DIAGNOSIS — I10 ASYMPTOMATIC HYPERTENSION: Primary | ICD-10-CM

## 2024-01-14 LAB
ALBUMIN SERPL-MCNC: 4.1 G/DL (ref 3.5–5.2)
ALBUMIN/GLOB SERPL: 1.1 G/DL
ALP SERPL-CCNC: 78 U/L (ref 39–117)
ALT SERPL W P-5'-P-CCNC: 16 U/L (ref 1–41)
ANION GAP SERPL CALCULATED.3IONS-SCNC: 10 MMOL/L (ref 5–15)
AST SERPL-CCNC: 19 U/L (ref 1–40)
BASOPHILS # BLD AUTO: 0.07 10*3/MM3 (ref 0–0.2)
BASOPHILS NFR BLD AUTO: 0.9 % (ref 0–1.5)
BILIRUB SERPL-MCNC: 0.3 MG/DL (ref 0–1.2)
BILIRUB UR QL STRIP: NEGATIVE
BUN SERPL-MCNC: 15 MG/DL (ref 8–23)
BUN/CREAT SERPL: 18.5 (ref 7–25)
CALCIUM SPEC-SCNC: 9.3 MG/DL (ref 8.6–10.5)
CHLORIDE SERPL-SCNC: 98 MMOL/L (ref 98–107)
CLARITY UR: CLEAR
CO2 SERPL-SCNC: 24 MMOL/L (ref 22–29)
COLOR UR: YELLOW
CREAT SERPL-MCNC: 0.81 MG/DL (ref 0.76–1.27)
DEPRECATED RDW RBC AUTO: 39.8 FL (ref 37–54)
EGFRCR SERPLBLD CKD-EPI 2021: 91.4 ML/MIN/1.73
EOSINOPHIL # BLD AUTO: 0.26 10*3/MM3 (ref 0–0.4)
EOSINOPHIL NFR BLD AUTO: 3.5 % (ref 0.3–6.2)
ERYTHROCYTE [DISTWIDTH] IN BLOOD BY AUTOMATED COUNT: 12.9 % (ref 12.3–15.4)
GLOBULIN UR ELPH-MCNC: 3.6 GM/DL
GLUCOSE SERPL-MCNC: 111 MG/DL (ref 65–99)
GLUCOSE UR STRIP-MCNC: NEGATIVE MG/DL
HCT VFR BLD AUTO: 44.9 % (ref 37.5–51)
HGB BLD-MCNC: 14.6 G/DL (ref 13–17.7)
HGB UR QL STRIP.AUTO: NEGATIVE
IMM GRANULOCYTES # BLD AUTO: 0.02 10*3/MM3 (ref 0–0.05)
IMM GRANULOCYTES NFR BLD AUTO: 0.3 % (ref 0–0.5)
KETONES UR QL STRIP: NEGATIVE
LEUKOCYTE ESTERASE UR QL STRIP.AUTO: NEGATIVE
LYMPHOCYTES # BLD AUTO: 1.84 10*3/MM3 (ref 0.7–3.1)
LYMPHOCYTES NFR BLD AUTO: 24.5 % (ref 19.6–45.3)
MAGNESIUM SERPL-MCNC: 2.1 MG/DL (ref 1.6–2.4)
MCH RBC QN AUTO: 27.7 PG (ref 26.6–33)
MCHC RBC AUTO-ENTMCNC: 32.5 G/DL (ref 31.5–35.7)
MCV RBC AUTO: 85.2 FL (ref 79–97)
MONOCYTES # BLD AUTO: 0.69 10*3/MM3 (ref 0.1–0.9)
MONOCYTES NFR BLD AUTO: 9.2 % (ref 5–12)
NEUTROPHILS NFR BLD AUTO: 4.63 10*3/MM3 (ref 1.7–7)
NEUTROPHILS NFR BLD AUTO: 61.6 % (ref 42.7–76)
NITRITE UR QL STRIP: NEGATIVE
NRBC BLD AUTO-RTO: 0 /100 WBC (ref 0–0.2)
NT-PROBNP SERPL-MCNC: 182.9 PG/ML (ref 0–1800)
PH UR STRIP.AUTO: 6 [PH] (ref 5–8)
PLATELET # BLD AUTO: 223 10*3/MM3 (ref 140–450)
PMV BLD AUTO: 9.9 FL (ref 6–12)
POTASSIUM SERPL-SCNC: 4.2 MMOL/L (ref 3.5–5.2)
PROT SERPL-MCNC: 7.7 G/DL (ref 6–8.5)
PROT UR QL STRIP: NEGATIVE
RBC # BLD AUTO: 5.27 10*6/MM3 (ref 4.14–5.8)
SODIUM SERPL-SCNC: 132 MMOL/L (ref 136–145)
SP GR UR STRIP: 1.01 (ref 1–1.03)
T4 FREE SERPL-MCNC: 1.09 NG/DL (ref 0.93–1.7)
TROPONIN T SERPL HS-MCNC: 10 NG/L
TSH SERPL DL<=0.05 MIU/L-ACNC: 4.12 UIU/ML (ref 0.27–4.2)
UROBILINOGEN UR QL STRIP: NORMAL
WBC NRBC COR # BLD AUTO: 7.51 10*3/MM3 (ref 3.4–10.8)

## 2024-01-14 PROCEDURE — 25010000002 HYDRALAZINE PER 20 MG: Performed by: STUDENT IN AN ORGANIZED HEALTH CARE EDUCATION/TRAINING PROGRAM

## 2024-01-14 PROCEDURE — 84443 ASSAY THYROID STIM HORMONE: CPT | Performed by: STUDENT IN AN ORGANIZED HEALTH CARE EDUCATION/TRAINING PROGRAM

## 2024-01-14 PROCEDURE — 83735 ASSAY OF MAGNESIUM: CPT | Performed by: STUDENT IN AN ORGANIZED HEALTH CARE EDUCATION/TRAINING PROGRAM

## 2024-01-14 PROCEDURE — 84484 ASSAY OF TROPONIN QUANT: CPT | Performed by: STUDENT IN AN ORGANIZED HEALTH CARE EDUCATION/TRAINING PROGRAM

## 2024-01-14 PROCEDURE — 80053 COMPREHEN METABOLIC PANEL: CPT | Performed by: STUDENT IN AN ORGANIZED HEALTH CARE EDUCATION/TRAINING PROGRAM

## 2024-01-14 PROCEDURE — 85025 COMPLETE CBC W/AUTO DIFF WBC: CPT | Performed by: STUDENT IN AN ORGANIZED HEALTH CARE EDUCATION/TRAINING PROGRAM

## 2024-01-14 PROCEDURE — 81003 URINALYSIS AUTO W/O SCOPE: CPT | Performed by: STUDENT IN AN ORGANIZED HEALTH CARE EDUCATION/TRAINING PROGRAM

## 2024-01-14 PROCEDURE — 83880 ASSAY OF NATRIURETIC PEPTIDE: CPT | Performed by: STUDENT IN AN ORGANIZED HEALTH CARE EDUCATION/TRAINING PROGRAM

## 2024-01-14 PROCEDURE — 99283 EMERGENCY DEPT VISIT LOW MDM: CPT

## 2024-01-14 PROCEDURE — 84439 ASSAY OF FREE THYROXINE: CPT | Performed by: STUDENT IN AN ORGANIZED HEALTH CARE EDUCATION/TRAINING PROGRAM

## 2024-01-14 PROCEDURE — 96374 THER/PROPH/DIAG INJ IV PUSH: CPT

## 2024-01-14 PROCEDURE — 93005 ELECTROCARDIOGRAM TRACING: CPT | Performed by: STUDENT IN AN ORGANIZED HEALTH CARE EDUCATION/TRAINING PROGRAM

## 2024-01-14 RX ORDER — CLONIDINE HYDROCHLORIDE 0.1 MG/1
0.1 TABLET ORAL 2 TIMES DAILY PRN
Qty: 6 TABLET | Refills: 0 | Status: SHIPPED | OUTPATIENT
Start: 2024-01-14

## 2024-01-14 RX ORDER — HYDRALAZINE HYDROCHLORIDE 25 MG/1
25 TABLET, FILM COATED ORAL 2 TIMES DAILY
Qty: 30 TABLET | Refills: 0 | Status: SHIPPED | OUTPATIENT
Start: 2024-01-14 | End: 2024-01-18 | Stop reason: SDUPTHER

## 2024-01-14 RX ORDER — HYDRALAZINE HYDROCHLORIDE 20 MG/ML
10 INJECTION INTRAMUSCULAR; INTRAVENOUS ONCE
Status: COMPLETED | OUTPATIENT
Start: 2024-01-14 | End: 2024-01-14

## 2024-01-14 RX ADMIN — HYDRALAZINE HYDROCHLORIDE 10 MG: 20 INJECTION INTRAMUSCULAR; INTRAVENOUS at 18:39

## 2024-01-15 NOTE — TELEPHONE ENCOUNTER
Noted.  Please call the patient with this information.  We had previously discussed trying pravastatin, and that is what I would recommend if the Repatha is too much money.

## 2024-01-15 NOTE — ED PROVIDER NOTES
EMERGENCY DEPARTMENT ENCOUNTER    Pt Name: Jasiel Ellis  MRN: [id number]  Pt :   1945  Room Number:    Date of encounter:  2024  PCP: Salome Knott MD  ED Provider: Dex Gutiérrez MD    Historian: Patient, spouse      HPI:  Chief Complaint: Hypertension        Context: Jasiel Ellis is a 78-year-old man who presents emergency department for evaluation of asymptomatic hypertension.  He has history of CAD and hypertension follows with Dr. Ugarte he did recently have his metoprolol stopped about a week ago.  Today he checked his blood pressure and it was elevated he is checked it several over the times and has been as high as systolic of 199 so he presents here for evaluation.  He denies any chest pain dizziness blurred vision or any other symptoms and says he feels well but because of the significant blood pressure presents here for evaluation.  No other complaints at this time.       PAST MEDICAL HISTORY  Past Medical History:   Diagnosis Date    Benign colon polyp     Dx  (rectal polyp ? path).    Bilateral hearing loss     Since approx  (L>R).    BPH (benign prostatic hyperplasia)     Sees Dr. Francisco    Chronic pain of both knees     R>L.  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    Coronary artery disease involving native coronary artery of native heart     dx 2022- Cardiac catheterization for NSTEMI (2022): Severe 1-vessel CAD (RPL) status post PCI with TABITHA. Echo (2020): LVEF 60%.  Normal functioning valves    Depression     I did not have depression    Essential hypertension     Dx approx .    History of cardiac catheterization 2022    LVEF 50%, 90% RCA right posterolateral artery stenosis treated 2.5 x 12 Xience TABITHA, 40-50% mid RCA with normal IFR, 30% proximal LAD, Chronically occluded first diagonal with 3-4+ collaterals    History of echocardiogram 2018    EF 60-65%, trace MR/TR, mildly  increased LV wall thickness.    History of echocardiogram 2022    Normal.  EF 60 %    History of varicella     Hyperlipidemia     Dx  ().    Infectious viral hepatitis     I dit not have hepatitis    NSTEMI (non-ST elevated myocardial infarction) 2022    Cardiac catheterization for NSTEMI (): Severe 1-vessel CAD (RPL) status post PCI with TABITHA. Echo (2020): LVEF 60%.  Normal functioning valves    Restless legs syndrome (RLS)     Saw Jameel (DANETTE).    Solitary nodule of right lobe of thyroid     Dx 2018 (approx) per Dr. Blank.  Patient states ultrasound showed a Calcium deposit.         PAST SURGICAL HISTORY  Past Surgical History:   Procedure Laterality Date    BLADDER STONE REMOVAL OPEN      [facility]    CARDIAC CATHETERIZATION N/A 2022    Procedure: Left Heart Cath;  Surgeon: Stef Ugarte MD;  Location: [facility] CATH INVASIVE LOCATION;  Service: Cardiovascular;  Laterality: N/A;    INGUINAL HERNIA REPAIR Right 1998    mesh placed (approx date)    LASER OF PROSTATE W/ GREEN LIGHT PVP      [facility]         FAMILY HISTORY  Family History   Problem Relation Age of Onset    No Known Problems Mother     No Known Problems Father     Diabetes Brother     Diabetes Brother     Colon cancer Brother          SOCIAL HISTORY  Social History     Socioeconomic History    Marital status:    Tobacco Use    Smoking status: Former     Packs/day: 1.00     Years: 50.00     Additional pack years: 0.00     Total pack years: 50.00     Types: Cigarettes     Start date: 1965     Quit date: 2015     Years since quittin.7     Passive exposure: Past    Smokeless tobacco: Never   Vaping Use    Vaping Use: Never used   Substance and Sexual Activity    Alcohol use: Yes     Comment: 2 beers per week    Drug use: No    Sexual activity: Yes     Partners: Female         ALLERGIES  Crestor [rosuvastatin] and Atorvastatin        REVIEW OF SYSTEMS  Review of Systems       All systems  reviewed and negative except for those discussed in HPI.       PHYSICAL EXAM    I have reviewed the triage vital signs and nursing notes.    ED Triage Vitals [01/14/24 1717]   Temp Heart Rate Resp BP SpO2   97.5 °F (36.4 °C) 60 18 (!) 203/108 99 %      Temp src Heart Rate Source Patient Position BP Location FiO2 (%)   Oral Monitor Sitting Left arm --       Physical Exam  GENERAL:   Appears in no acute distress.   HENT: Nares patent.  EYES: No scleral icterus.  CV: Regular rhythm, regular rate.  RESPIRATORY: Normal effort.  No audible wheezes, rales or rhonchi.  ABDOMEN: Soft, nontender  MUSCULOSKELETAL: No deformities.   NEURO: Alert, moves all extremities, follows commands.  SKIN: Warm, dry, no rash visualized.      LAB RESULTS  Recent Results (from the past 24 hour(s))   ECG 12 Lead Electrolyte Imbalance    Collection Time: 01/14/24  5:50 PM   Result Value Ref Range    QT Interval 408 ms    QTC Interval 397 ms   Comprehensive Metabolic Panel    Collection Time: 01/14/24  6:39 PM    Specimen: Blood   Result Value Ref Range    Glucose 111 (H) 65 - 99 mg/dL    BUN 15 8 - 23 mg/dL    Creatinine 0.81 0.76 - 1.27 mg/dL    Sodium 132 (L) 136 - 145 mmol/L    Potassium 4.2 3.5 - 5.2 mmol/L    Chloride 98 98 - 107 mmol/L    CO2 24.0 22.0 - 29.0 mmol/L    Calcium 9.3 8.6 - 10.5 mg/dL    Total Protein 7.7 6.0 - 8.5 g/dL    Albumin 4.1 3.5 - 5.2 g/dL    ALT (SGPT) 16 1 - 41 U/L    AST (SGOT) 19 1 - 40 U/L    Alkaline Phosphatase 78 39 - 117 U/L    Total Bilirubin 0.3 0.0 - 1.2 mg/dL    Globulin 3.6 gm/dL    A/G Ratio 1.1 g/dL    BUN/Creatinine Ratio 18.5 7.0 - 25.0    Anion Gap 10.0 5.0 - 15.0 mmol/L    eGFR 91.4 >60.0 mL/min/1.73   Urinalysis With Microscopic If Indicated (No Culture) - Urine, Clean Catch    Collection Time: 01/14/24  6:39 PM    Specimen: Urine, Clean Catch   Result Value Ref Range    Color, UA Yellow Yellow, Straw    Appearance, UA Clear Clear    pH, UA 6.0 5.0 - 8.0    Specific Gravity, UA 1.009 1.001 -  1.030    Glucose, UA Negative Negative    Ketones, UA Negative Negative    Bilirubin, UA Negative Negative    Blood, UA Negative Negative    Protein, UA Negative Negative    Leuk Esterase, UA Negative Negative    Nitrite, UA Negative Negative    Urobilinogen, UA 0.2 E.U./dL 0.2 - 1.0 E.U./dL   Single High Sensitivity Troponin T    Collection Time: 01/14/24  6:39 PM    Specimen: Blood   Result Value Ref Range    HS Troponin T 10 <22 ng/L   BNP    Collection Time: 01/14/24  6:39 PM    Specimen: Blood   Result Value Ref Range    proBNP 182.9 0.0 - 1,800.0 pg/mL   Magnesium    Collection Time: 01/14/24  6:39 PM    Specimen: Blood   Result Value Ref Range    Magnesium 2.1 1.6 - 2.4 mg/dL   TSH    Collection Time: 01/14/24  6:39 PM    Specimen: Blood   Result Value Ref Range    TSH 4.120 0.270 - 4.200 uIU/mL   T4, Free    Collection Time: 01/14/24  6:39 PM    Specimen: Blood   Result Value Ref Range    Free T4 1.09 0.93 - 1.70 ng/dL   CBC Auto Differential    Collection Time: 01/14/24  6:39 PM    Specimen: Blood   Result Value Ref Range    WBC 7.51 3.40 - 10.80 10*3/mm3    RBC 5.27 4.14 - 5.80 10*6/mm3    Hemoglobin 14.6 13.0 - 17.7 g/dL    Hematocrit 44.9 37.5 - 51.0 %    MCV 85.2 79.0 - 97.0 fL    MCH 27.7 26.6 - 33.0 pg    MCHC 32.5 31.5 - 35.7 g/dL    RDW 12.9 12.3 - 15.4 %    RDW-SD 39.8 37.0 - 54.0 fl    MPV 9.9 6.0 - 12.0 fL    Platelets 223 140 - 450 10*3/mm3    Neutrophil % 61.6 42.7 - 76.0 %    Lymphocyte % 24.5 19.6 - 45.3 %    Monocyte % 9.2 5.0 - 12.0 %    Eosinophil % 3.5 0.3 - 6.2 %    Basophil % 0.9 0.0 - 1.5 %    Immature Grans % 0.3 0.0 - 0.5 %    Neutrophils, Absolute 4.63 1.70 - 7.00 10*3/mm3    Lymphocytes, Absolute 1.84 0.70 - 3.10 10*3/mm3    Monocytes, Absolute 0.69 0.10 - 0.90 10*3/mm3    Eosinophils, Absolute 0.26 0.00 - 0.40 10*3/mm3    Basophils, Absolute 0.07 0.00 - 0.20 10*3/mm3    Immature Grans, Absolute 0.02 0.00 - 0.05 10*3/mm3    nRBC 0.0 0.0 - 0.2 /100 WBC       If labs were ordered, I  independently reviewed the results and considered them in treating the patient.        RADIOLOGY  No Radiology Exams Resulted Within Past 24 Hours    I ordered and independently reviewed the above noted radiographic studies.        See radiologist's dictation for official interpretation.        PROCEDURES    Procedures    ECG 12 Lead Electrolyte Imbalance   Preliminary Result   Test Reason : Electrolyte Imbalance   Blood Pressure :   */*   mmHG   Vent. Rate :  57 BPM     Atrial Rate :  57 BPM      P-R Int : 166 ms          QRS Dur :  66 ms       QT Int : 408 ms       P-R-T Axes :  34 -24  18 degrees      QTc Int : 397 ms      Sinus bradycardia   Moderate voltage criteria for LVH, may be normal variant   Inferior infarct , age undetermined   Lateral injury pattern   ** ** ACUTE MI / STEMI ** **   Abnormal ECG   When compared with ECG of 10-ANASTASIA-2023 00:11,   premature ventricular complexes are no longer present      Referred By: EDMD           Confirmed By:           MEDICATIONS GIVEN IN ER    Medications   hydrALAZINE (APRESOLINE) injection 10 mg (10 mg Intravenous Given 1/14/24 1839)         MEDICAL DECISION MAKING, PROGRESS, and CONSULTS    All labs, if obtained, have been independently reviewed by me.  All radiology studies, if obtained, have been reviewed by me and the radiologist dictating the report.  All EKG's, if obtained, have been independently viewed and interpreted by me/my attending physician.      Discussion below represents my analysis of pertinent findings related to patient's condition, differential diagnosis, treatment plan and final disposition.                         Differential diagnosis:    Hypertensive urgency, hypertensive emergency, hypothyroidism, heart failure, kidney injury, anemia, electrolyte abnormality      Additional sources:    - Discussed/ obtained information from independent historians: Spouse    - External (non-ED) record review: Cardiology notes with Dr. Ugarte for CAD,  hypertension, hyperlipidemia    - Chronic or social conditions impacting care: CAD, hypertension, hyperlipidemia    - Shared decision making: Agreeable to follow-up and discharge.      Orders placed during this visit:  Orders Placed This Encounter   Procedures    Comprehensive Metabolic Panel    Urinalysis With Microscopic If Indicated (No Culture) - Urine, Clean Catch    Single High Sensitivity Troponin T    BNP    Magnesium    TSH    T4, Free    CBC Auto Differential    Ambulatory Referral to [facility]    ECG 12 Lead Electrolyte Imbalance    CBC & Differential         Additional orders considered but not ordered:      ED Course:    Consultants:      ED Course as of 01/15/24 0226   Sun Jan 14, 2024   1732 In summary is a very nice 78-year-old man who presents emergency department for evaluation of asymptomatic hypertension.  He has history of CAD and hypertension follows with Dr. Ugarte he did recently have his metoprolol stopped about a week ago.  Today he checked his blood pressure and it was elevated he is checked it several over the times and has been as high as systolic of 199 so he presents here for evaluation.  He denies any chest pain dizziness blurred vision or any other symptoms and says he feels well but because of the significant blood pressure presents here for evaluation.  No other complaints at this time. [CC]   1733 He arrived awake and alert but is profoundly hypertensive initial blood pressure 203/108 he is borderline bradycardic with a heart rate of 60 so not sure the metoprolol that was stopped is the answer on giving him 1 dose of IV hydralazine.  Obtaining basic cardiac and laboratory workup to make sure this is not related to renal function and thyroid or some sort of cardiac event will reevaluate pending initial workup. [CC]   Mon Eugenio 15, 2024   0225 After treatment with hydralazine blood pressures come down currently 154/81 he remains well upon reevaluation.  ECG  reassuring.  CBC and CMP reassuring and nonactionable.  No elevation in high-sensitivity troponin.  Normal renal function.  Normal thyroid function.  At this point I think he can safely discharge and writing him a prescription for hydralazine and he already has as needed clonidine for hypertensive episodes though he said he had forgotten about that and did not take that prior to arrival.  I am giving him urgent referral to our hypertension clinic.  Discharged in stable condition with strict return precautions. [CC]      ED Course User Index  [CC] Dex Gutiérrez MD              Shared Decision Making:  After my consideration of clinical presentation and any laboratory/radiology studies obtained, I discussed the findings with the patient/patient representative who is in agreement with the treatment plan and the final disposition.   Risks and benefits of discharge and/or observation/admission were discussed.       AS OF 02:26 EST VITALS:    BP - 142/85  HR - 63  TEMP - 97.5 °F (36.4 °C) (Oral)  O2 SATS - 98%                  DIAGNOSIS  Final diagnoses:   Asymptomatic hypertension   Hyponatremia   Primary hypertension         DISPOSITION  DISCHARGE    Patient discharged in stable condition.    Reviewed implications of results, diagnosis, meds, responsibility to follow up, warning signs and symptoms of possible worsening, potential complications and reasons to return to ER.    Patient/Family voiced understanding of above instructions.    Discussed plan for discharge, as there is no emergent indication for admission.  Pt/family is agreeable and understands need for follow up and possible repeat testing.  Pt/family is aware that discharge does not mean that nothing is wrong but that it indicates no emergency is currently present that requires admission and they must continue care with follow-up as given below or with a physician of their choice.     FOLLOW-UP  [facility] CARDIOLOGY  [address] [facility] [address]  [facility] [address]  [phone]             Medication List        New Prescriptions      hydrALAZINE 25 MG tablet  Commonly known as: APRESOLINE  Take 1 tablet by mouth 2 (Two) Times a Day.               Where to Get Your Medications        These medications were sent to [address] - [phone]  - [phone]   [address] [facility] [address] [address]      Phone: [phone]   cloNIDine 0.1 MG tablet  hydrALAZINE 25 MG tablet             Please note that portions of this document were completed with voice recognition software.        Dex Gutiérrez MD  01/15/24 4312

## 2024-01-15 NOTE — DISCHARGE INSTRUCTIONS
You will be contacted with follow-up in the hypertension clinic for the time being take the provided blood pressure medication with seem to work well here in the emergency department.  You can also use the as needed clonidine for systolic blood pressure more than 180.

## 2024-01-15 NOTE — TELEPHONE ENCOUNTER
Spoke to patient he stated that he went to the ER last night for high blood pressure and he is suppose to see cardiology on 01/18/2024, Patient would like to wait on starting any new medication til he meets with cardiology

## 2024-01-15 NOTE — TELEPHONE ENCOUNTER
Spoke to pharmacy she stated that it is covered without a prior Auth With his insurance it brought it down to 539.00. Pharmacy stated that is probably why the patient called us because it was too high.

## 2024-01-18 ENCOUNTER — OFFICE VISIT (OUTPATIENT)
Dept: CARDIOLOGY | Facility: HOSPITAL | Age: 79
End: 2024-01-18
Payer: MEDICARE

## 2024-01-18 ENCOUNTER — HOSPITAL ENCOUNTER (OUTPATIENT)
Dept: CARDIOLOGY | Facility: HOSPITAL | Age: 79
Discharge: HOME | End: 2024-01-18
Payer: MEDICARE

## 2024-01-18 VITALS
TEMPERATURE: 97.8 F | SYSTOLIC BLOOD PRESSURE: 149 MMHG | WEIGHT: 184.06 LBS | OXYGEN SATURATION: 99 % | RESPIRATION RATE: 18 BRPM | HEIGHT: 67 IN | BODY MASS INDEX: 28.89 KG/M2 | DIASTOLIC BLOOD PRESSURE: 82 MMHG | HEART RATE: 71 BPM

## 2024-01-18 DIAGNOSIS — I10 PRIMARY HYPERTENSION: Primary | ICD-10-CM

## 2024-01-18 DIAGNOSIS — I25.10 CORONARY ARTERY DISEASE INVOLVING NATIVE CORONARY ARTERY OF NATIVE HEART WITHOUT ANGINA PECTORIS: ICD-10-CM

## 2024-01-18 DIAGNOSIS — E78.5 HYPERLIPIDEMIA LDL GOAL <70: Primary | ICD-10-CM

## 2024-01-18 DIAGNOSIS — I25.110 CORONARY ARTERY DISEASE INVOLVING NATIVE CORONARY ARTERY OF NATIVE HEART WITH UNSTABLE ANGINA PECTORIS: ICD-10-CM

## 2024-01-18 PROCEDURE — 93005 ELECTROCARDIOGRAM TRACING: CPT | Performed by: NURSE PRACTITIONER

## 2024-01-18 RX ORDER — PRAVASTATIN SODIUM 20 MG
20 TABLET ORAL DAILY
Qty: 30 TABLET | Refills: 5 | Status: SHIPPED | OUTPATIENT
Start: 2024-01-18

## 2024-01-18 RX ORDER — ATORVASTATIN CALCIUM 10 MG/1
10 TABLET, FILM COATED ORAL EVERY OTHER DAY
COMMUNITY
End: 2024-01-18 | Stop reason: SINTOL

## 2024-01-18 RX ORDER — HYDRALAZINE HYDROCHLORIDE 25 MG/1
25 TABLET, FILM COATED ORAL 2 TIMES DAILY
Qty: 60 TABLET | Refills: 5 | Status: SHIPPED | OUTPATIENT
Start: 2024-01-18

## 2024-01-18 NOTE — PROGRESS NOTES
Chief Complaint  Establish Care and Hypertension    Subjective      History of Present Illness {CC  Problem List  Visit  Diagnosis   Encounters  Notes  Medications  Labs  Result Review Imaging  Media :23}     Jasiel Ellis, 78 y.o. male with past medical history significant for BPH, CAD status post PCI, hypertension, hyperlipidemia, restless leg syndrome, and thyroid nodule, who presents to [facility] for Establish Care and Hypertension  Patient presented to the ED on 1/14/2024 with chief complaint of asymptomatic hypertension.  At that time, patient stated his blood pressure had been as high as 199 systolic.  Initial blood pressure upon arrival to the ED was 203/108.  He was also noted to be borderline bradycardic with heart rate in the 50s to 60s.  Patient was given hydralazine IV with improvement in blood pressure to 154/81.  CMP revealed mild hyponatremia.  Other lab work including CBC, high-sensitivity troponin, BNP, magnesium, TSH, and free T4, all unremarkable.  Patient was discharged with prescription to take hydralazine 25 mg twice daily as well as clonidine 0.1 mg as needed for systolic greater than 180.    Since time of discharge from the ED, patient states he has felt well overall.  He is compliant with checking his blood pressure at home and checks blood pressure multiple times throughout the day.  Blood pressure at home running on average 127/70.  He currently denies chest pain or pressure, shortness of air, palpitations, syncope, near syncope, or lower extremity edema.    Recently, patient has stopped his cholesterol medication due to myalgias.  Patient was recommended to initiate Repatha injections.  Patient would prefer to not initiate injectable medication.  He would like to discuss continuation of atorvastatin at a low dose.    Twelve-lead EKG today showed normal sinus rhythm, rate 71.      Caffeine intake of 1 cup of coffee approximately 3 times weekly  Water  "intake inadeaqute  Quit smoking 10 years prior      Echocardiogram 5/20/2022:  Estimated left ventricular EF = 60%  The cardiac valves are anatomically and functionally normal.      Objective     Vital Signs:   Vitals:    01/18/24 1023 01/18/24 1026 01/18/24 1027   BP: 157/77 137/83 149/82   BP Location: Right arm Left arm Left arm   Patient Position: Sitting Standing Sitting   Cuff Size: Adult Adult Adult   Pulse: 70 78 71   Resp:   18   Temp:   97.8 °F (36.6 °C)   TempSrc:   Temporal   SpO2: 97% 99% 99%   Weight:   83.5 kg (184 lb 1 oz)   Height:   170.2 cm (67\")     Body mass index is 28.83 kg/m².  Physical Exam  Vitals and nursing note reviewed.   Constitutional:       Appearance: Normal appearance.   HENT:      Head: Normocephalic.   Eyes:      Pupils: Pupils are equal, round, and reactive to light.   Cardiovascular:      Rate and Rhythm: Normal rate and regular rhythm.      Pulses: Normal pulses.      Heart sounds: Normal heart sounds. No murmur heard.  Pulmonary:      Effort: Pulmonary effort is normal.      Breath sounds: Normal breath sounds.   Abdominal:      General: Bowel sounds are normal.      Palpations: Abdomen is soft.   Musculoskeletal:         General: Normal range of motion.      Cervical back: Normal range of motion.      Right lower leg: No edema.      Left lower leg: No edema.   Skin:     General: Skin is warm and dry.      Capillary Refill: Capillary refill takes less than 2 seconds.   Neurological:      Mental Status: He is alert and oriented to person, place, and time.   Psychiatric:         Mood and Affect: Mood normal.         Thought Content: Thought content normal.                Data Reviewed:{ Labs  Result Review  Imaging  Med Tab  Media :23}   Lab Results   Component Value Date    WBC 7.51 01/14/2024    HGB 14.6 01/14/2024    HCT 44.9 01/14/2024    MCV 85.2 01/14/2024     01/14/2024      Lab Results   Component Value Date    GLUCOSE 111 (H) 01/14/2024    BUN 15 01/14/2024 "    CREATININE 0.81 01/14/2024    EGFR 91.4 01/14/2024    BCR 18.5 01/14/2024    K 4.2 01/14/2024    CO2 24.0 01/14/2024    CALCIUM 9.3 01/14/2024    ALBUMIN 4.1 01/14/2024    BILITOT 0.3 01/14/2024    AST 19 01/14/2024    ALT 16 01/14/2024      Lab Results   Component Value Date    TROPONINT 10 01/14/2024      Lab Results   Component Value Date    TSH 4.120 01/14/2024      ECG 12 Lead Electrolyte Imbalance (01/14/2024 17:50)       Assessment & Plan   Assessment and Plan {CC Problem List  Visit Diagnosis  ROS  Review (Popup)  Health Maintenance  Quality  BestPractice  Medications  SmartSets  SnapShot Encounters  Media :23}     1. Primary hypertension  -Blood pressure currently well-controlled since visit in ED  -Encourage patient to continue ambulatory blood pressure monitoring.  He will notify our office if blood pressures consistently higher than 140/90, or consistently less than 100  -For now, recommend patient to continue losartan 50 mg daily, and hydralazine at 25 mg twice daily  -Further medication adjustments to be made per Dr. Ugarte at next visit or per primary care    2. Coronary artery disease involving native coronary artery of native heart without unstable angina pectoris  -Patient to continue aspirin, atorvastatin, and metoprolol  -Patient would like to attempt to take atorvastatin at 10 mg every other day.  He would like to defer initiation of Repatha or injectable cholesterol medication at this time.  Patient will reach out to primary care if he is unable to tolerate taking atorvastatin every other day  - ECG 12 Lead; Future  -Patient to reach out to heart and valve if symptoms change or worsen prior to visit with cardiology          Follow Up {Instructions Charge Capture  Follow-up Communications :23}     Return if symptoms worsen or fail to improve.    Patient was given instructions and counseling regarding his condition or for health maintenance advice. Please see specific  information pulled into the AVS if appropriate.  Patient was instructed to call the [facility] with any questions, concerns, or worsening symptoms.    Dictated Utilizing Dragon Dictation   Please note that portions of this note were completed with a voice recognition program.   Part of this note may be an electronic transcription/translation of spoken language to printed text using the Dragon Dictation System.

## 2024-01-21 DIAGNOSIS — E78.5 HYPERLIPIDEMIA LDL GOAL <70: ICD-10-CM

## 2024-01-21 DIAGNOSIS — R79.89 ELEVATED TSH: Primary | ICD-10-CM

## 2024-01-21 LAB
QT INTERVAL: 372 MS
QTC INTERVAL: 404 MS

## 2024-01-22 ENCOUNTER — TELEPHONE (OUTPATIENT)
Dept: CARDIOLOGY | Facility: HOSPITAL | Age: 79
End: 2024-01-22
Payer: MEDICARE

## 2024-01-22 LAB
QT INTERVAL: 408 MS
QTC INTERVAL: 397 MS

## 2024-01-22 NOTE — TELEPHONE ENCOUNTER
Patient called with questions about his EKG and expressed concerns wanted to know if there was anything to be concerned of based off his last EKG with our office. I stated that per the office note of the provider he had seen with our office Susanne BRADY his EKG is normal sinus rhythm and with a heart rate of 71 which is normal and a good rate and there are no concerns based off his EKG at this time. Patient acknowledged and understood and will call back if he has any other questions or concerns.

## 2024-01-29 ENCOUNTER — PRIOR AUTHORIZATION (OUTPATIENT)
Dept: CARDIOLOGY | Facility: CLINIC | Age: 79
End: 2024-01-29
Payer: MEDICARE

## 2024-01-29 ENCOUNTER — OFFICE VISIT (OUTPATIENT)
Dept: CARDIOLOGY | Facility: CLINIC | Age: 79
End: 2024-01-29
Payer: MEDICARE

## 2024-01-29 VITALS
OXYGEN SATURATION: 97 % | BODY MASS INDEX: 28.66 KG/M2 | HEIGHT: 67 IN | SYSTOLIC BLOOD PRESSURE: 126 MMHG | DIASTOLIC BLOOD PRESSURE: 60 MMHG | HEART RATE: 73 BPM | WEIGHT: 182.6 LBS

## 2024-01-29 DIAGNOSIS — E78.5 HYPERLIPIDEMIA LDL GOAL <70: ICD-10-CM

## 2024-01-29 DIAGNOSIS — I25.10 CORONARY ARTERY DISEASE INVOLVING NATIVE CORONARY ARTERY OF NATIVE HEART WITHOUT ANGINA PECTORIS: Primary | ICD-10-CM

## 2024-01-29 DIAGNOSIS — I10 ESSENTIAL HYPERTENSION: ICD-10-CM

## 2024-01-29 PROCEDURE — 3078F DIAST BP <80 MM HG: CPT | Performed by: INTERNAL MEDICINE

## 2024-01-29 PROCEDURE — 99214 OFFICE O/P EST MOD 30 MIN: CPT | Performed by: INTERNAL MEDICINE

## 2024-01-29 PROCEDURE — 1159F MED LIST DOCD IN RCRD: CPT | Performed by: INTERNAL MEDICINE

## 2024-01-29 PROCEDURE — 3074F SYST BP LT 130 MM HG: CPT | Performed by: INTERNAL MEDICINE

## 2024-01-29 PROCEDURE — 1160F RVW MEDS BY RX/DR IN RCRD: CPT | Performed by: INTERNAL MEDICINE

## 2024-01-29 NOTE — TELEPHONE ENCOUNTER
[employer]   (Key: [id number])  Repatha SureClick 140MG/ML auto-injectors  Form  [employer] Electronic PA Form (2017 NCP[employer])      Sent to Plan      Determination    Wait for Questions  [employer] 2017 typically responds with questions in less than 15 minutes, but may take up to 24 hours.

## 2024-01-29 NOTE — PROGRESS NOTES
"[facility]iology  Subjective:     Encounter Date: 01/29/2024      Patient ID: Jasiel Ellis is a 78 y.o. male.    Chief Complaint: Coronary artery disease involving native coronary artery of      PROBLEM LIST:  Coronary artery disease involving native coronary artery of native heart:  AAA US, 07/25/2019: Patent nonaneurysmal abdominal aorta.   Echo, 05/20/2022: EF 60%.   Cardiac catheterization, 05/20/2022: 90% right posterolateral artery stenosis treated 2.5 x 12 Xience TABITHA. Chronically occluded first diagonal with 3-4+ collaterals. 40-50% mid RCA with normal IFR. 30% proximal LAD. LVEF 50%.  MCOT, 09/30/2022: No A. fib. PVC iban 8%. PAC burden 2%.  NSTEMI.  Essential hypertension.  Hyperlipidemia LDL goal <70.  Former smoker.       History of Present Illness  Jasiel Ellis returns today for a follow up with a history of CAD and cardiac risk factors. Since last visit, patient has been doing well overall from a cardiovascular standpoint. However, he reports that his blood pressure occasionally fluctuates as high as 200 mmHg and as low as 98 mmHg systolic. Patient states he experiences no symptoms of his elevated blood pressure and only notices due to monitoring it with his at home cuff. He notes that he is often \"paranoid\" and anxious about his health. Patient reports severe myalgias while taking his statins but was recently given pravastatin by his PCP that he will start taking today. He is hesitant to try injectable cholesterol medication due to complications his brother experienced. Patient denies chest pain, shortness of breath, orthopnea, palpitations, edema, dizziness, and syncope.     Allergies   Allergen Reactions    Atorvastatin Myalgia    Crestor [Rosuvastatin] Myalgia         Current Outpatient Medications:     acetaminophen (TYLENOL) 500 MG tablet, Take 1 tablet by mouth Every 6 (Six) Hours As Needed for Mild Pain., Disp: , Rfl:     aspirin 81 MG EC tablet, Take 1 " "tablet by mouth Daily., Disp: 90 tablet, Rfl: 1    carbidopa-levodopa (SINEMET)  MG per tablet, Take 1 tablet by mouth Daily., Disp: , Rfl:     cloNIDine (CATAPRES) 0.1 MG tablet, Take 1 tablet by mouth 2 (Two) Times a Day As Needed for High Blood Pressure (Systolic blood pressure consistently above 180)., Disp: 6 tablet, Rfl: 0    coenzyme Q10 100 MG capsule, Take 1 capsule by mouth Daily., Disp: , Rfl:     gabapentin (NEURONTIN) 300 MG capsule, Take 1 capsule by mouth 3 (Three) Times a Day As Needed (Leg pain)., Disp: 90 capsule, Rfl: 5    hydrALAZINE (APRESOLINE) 25 MG tablet, Take 1 tablet by mouth 2 (Two) Times a Day., Disp: 60 tablet, Rfl: 5    losartan (COZAAR) 50 MG tablet, Take 1 tablet by mouth Daily., Disp: 90 tablet, Rfl: 3    metoprolol tartrate (LOPRESSOR) 25 MG tablet, TAKE 1 TABLET BY MOUTH EVERY 12 HOURS, Disp: 180 tablet, Rfl: 3    pravastatin (PRAVACHOL) 20 MG tablet, Take 1 tablet by mouth Daily., Disp: 30 tablet, Rfl: 5    The following portions of the patient's history were reviewed and updated as appropriate: allergies, current medications, past family history, past medical history, past social history, past surgical history and problem list.    Review of Systems   Constitutional: Negative.   Cardiovascular:  Negative for chest pain, dyspnea on exertion, leg swelling, palpitations and syncope.   Respiratory: Negative.  Negative for shortness of breath.    Hematologic/Lymphatic: Negative for bleeding problem. Does not bruise/bleed easily.   Skin:  Negative for rash.   Musculoskeletal:  Negative for muscle weakness and myalgias.   Gastrointestinal:  Negative for heartburn, nausea and vomiting.   Neurological:  Negative for dizziness, light-headedness, loss of balance and numbness.          Objective:     Vitals:    01/29/24 0952   BP: 126/60   BP Location: Left arm   Patient Position: Sitting   Pulse: 73   SpO2: 97%   Weight: 82.8 kg (182 lb 9.6 oz)   Height: 168.9 cm (66.5\")         " Vitals reviewed.   Constitutional:       Appearance: Well-developed and not in distress.   Neck:      Thyroid: No thyromegaly.      Vascular: No carotid bruit or JVD.   Pulmonary:      Breath sounds: Normal breath sounds.   Cardiovascular:      Regular rhythm.      No gallop. No S3 and S4 gallop.   Pulses:     Intact distal pulses.      Carotid: 2+ bilaterally.     Radial: 2+ bilaterally.  Edema:     Peripheral edema absent.   Abdominal:      General: Bowel sounds are normal.      Palpations: Abdomen is soft. There is no abdominal mass.      Tenderness: There is no abdominal tenderness.   Musculoskeletal:         General: No deformity.      Extremities: No clubbing present.Skin:     General: Skin is warm and dry.      Findings: No rash.   Neurological:      Mental Status: Alert and oriented to person, place, and time.         Lab Review:  Lab Results   Component Value Date    GLUCOSE 111 (H) 01/14/2024    BUN 15 01/14/2024    CREATININE 0.81 01/14/2024    BCR 18.5 01/14/2024    K 4.2 01/14/2024    CO2 24.0 01/14/2024    CALCIUM 9.3 01/14/2024    ALBUMIN 4.1 01/14/2024    ALKPHOS 78 01/14/2024    AST 19 01/14/2024    ALT 16 01/14/2024     Lab Results   Component Value Date    CHOL 188 01/10/2024    TRIG 188 (H) 01/10/2024    HDL 43 01/10/2024     (H) 01/10/2024      Lab Results   Component Value Date    WBC 7.51 01/14/2024    RBC 5.27 01/14/2024    HGB 14.6 01/14/2024    HCT 44.9 01/14/2024    MCV 85.2 01/14/2024     01/14/2024     Lab Results   Component Value Date    TSH 4.120 01/14/2024       Procedures      Advance Care Planning   ACP discussion was held with the patient during this visit. Patient has an advance directive (not in EMR), copy requested.           Assessment:   Diagnoses and all orders for this visit:    1. Coronary artery disease involving native coronary artery of native heart without angina pectoris (Primary)    2. Essential hypertension    3. Hyperlipidemia LDL goal  <70        Impression:  1. Coronary artery disease. Stable without angina on current activity. Continue on aspirin 81 mg for antiplatelet therapy.     2. Essential hypertension. Well controlled. Continue on losartan 50 mg daily for hypertension. Continue on metoprolol 25 mg BID for rate control and hypertension.  Rare labile spikes in blood pressure occurring approximately 6 months.  These are asymptomatic    3. Hyperlipidemia LDL goal <70. Elevated LDL. Continue on pravastatin 20 mg daily for hyperlipidemia.  Intolerant to statin    Plan:  Stable cardiac status.   Wean hydralazine 25 mg BID, to off.  Ending blood pressure.  Intolerant to statins and LDL still greater than 112.  Will attempt to get PCSK9 inhibitor or Leqvio covered.  (The past has been issue with cost for the patient).  Continue current medications.  Revisit on 11/11/2024.      Scribed for Stef Ugarte MD by Gauri Edmondson. 1/29/2024 10:01 EST    Stef Ugarte MD    Please note that portions of this note may have been completed with a voice recognition program. Efforts were made to edit the dictations, but occasionally words are mistranscribed.

## 2024-02-02 ENCOUNTER — HOSPITAL ENCOUNTER (OUTPATIENT)
Dept: ULTRASOUND IMAGING | Facility: HOSPITAL | Age: 79
Discharge: HOME | End: 2024-02-02
Payer: MEDICARE

## 2024-02-02 DIAGNOSIS — E04.1 SOLITARY NODULE OF RIGHT LOBE OF THYROID: ICD-10-CM

## 2024-02-02 PROCEDURE — 76536 US EXAM OF HEAD AND NECK: CPT

## 2024-02-27 ENCOUNTER — TELEPHONE (OUTPATIENT)
Dept: INTERNAL MEDICINE | Facility: CLINIC | Age: 79
End: 2024-02-27
Payer: MEDICARE

## 2024-02-27 NOTE — TELEPHONE ENCOUNTER
Called patient and read providers message, good verb given. He is scheduled tomorrow with Dr. Knott.

## 2024-02-27 NOTE — TELEPHONE ENCOUNTER
He should be seen.  I can work him in tomorrow.  In the interim, I recommend adding Ibuprofen or Aleve to the Tylenol, and continuing ice.

## 2024-02-27 NOTE — TELEPHONE ENCOUNTER
Caller: Jasiel Ellis    Relationship: Self    Best call back number: [phone]     What is the best time to reach you: ANY    Who are you requesting to speak with (clinical staff, provider,  specific staff member): DR. WYATT AND HER NURSE    What was the call regarding: THE PATIENT SAID THAT IN HIS LEFT KNEE THERE IS EXCRUCIATING PAIN. HE SAID THAT WHEN HE WAKES UP IT IS HORRIBLE AND LASTS THROUGHOUT THE DAY. HE SAID THE PAIN DOES NOT STAY IN THE SAME SPOT. IT WILL ROTATE AND MOVE AROUND THE KNEE. ICE AND TYLENOL DOES NOT HELP. PATIENT IS ASKING TO BE ADVISED ON WHAT TO DO OR WHAT COULD BE HAPPENING. PLEASE CALL HIM BACK.    Is it okay if the provider responds through MyChart: NO

## 2024-02-28 ENCOUNTER — HOSPITAL ENCOUNTER (OUTPATIENT)
Dept: GENERAL RADIOLOGY | Facility: HOSPITAL | Age: 79
Discharge: HOME | End: 2024-02-28
Admitting: INTERNAL MEDICINE
Payer: MEDICARE

## 2024-02-28 ENCOUNTER — TELEPHONE (OUTPATIENT)
Dept: INTERNAL MEDICINE | Facility: CLINIC | Age: 79
End: 2024-02-28

## 2024-02-28 ENCOUNTER — OFFICE VISIT (OUTPATIENT)
Dept: INTERNAL MEDICINE | Facility: CLINIC | Age: 79
End: 2024-02-28
Payer: MEDICARE

## 2024-02-28 VITALS
SYSTOLIC BLOOD PRESSURE: 136 MMHG | HEART RATE: 76 BPM | DIASTOLIC BLOOD PRESSURE: 66 MMHG | RESPIRATION RATE: 16 BRPM | WEIGHT: 181.38 LBS | TEMPERATURE: 97.8 F | BODY MASS INDEX: 28.84 KG/M2

## 2024-02-28 DIAGNOSIS — M79.642 BILATERAL HAND PAIN: ICD-10-CM

## 2024-02-28 DIAGNOSIS — M25.562 ACUTE PAIN OF BOTH KNEES: ICD-10-CM

## 2024-02-28 DIAGNOSIS — R79.89 ELEVATED TSH: ICD-10-CM

## 2024-02-28 DIAGNOSIS — E78.5 HYPERLIPIDEMIA LDL GOAL <70: ICD-10-CM

## 2024-02-28 DIAGNOSIS — M25.50 PAIN IN JOINT, MULTIPLE SITES: Primary | ICD-10-CM

## 2024-02-28 DIAGNOSIS — M25.561 ACUTE PAIN OF BOTH KNEES: ICD-10-CM

## 2024-02-28 DIAGNOSIS — M79.641 BILATERAL HAND PAIN: ICD-10-CM

## 2024-02-28 DIAGNOSIS — G89.29 CHRONIC PAIN OF BOTH SHOULDERS: ICD-10-CM

## 2024-02-28 DIAGNOSIS — M25.512 CHRONIC PAIN OF BOTH SHOULDERS: ICD-10-CM

## 2024-02-28 DIAGNOSIS — M25.511 CHRONIC PAIN OF BOTH SHOULDERS: ICD-10-CM

## 2024-02-28 DIAGNOSIS — M05.80 POLYARTHRITIS WITH POSITIVE RHEUMATOID FACTOR: ICD-10-CM

## 2024-02-28 LAB
ALBUMIN SERPL-MCNC: 4.5 G/DL (ref 3.5–5.2)
ALBUMIN/GLOB SERPL: 1.3 G/DL
ALP SERPL-CCNC: 79 U/L (ref 39–117)
ALT SERPL W P-5'-P-CCNC: 16 U/L (ref 1–41)
ANION GAP SERPL CALCULATED.3IONS-SCNC: 11 MMOL/L (ref 5–15)
ASO AB SERPL-ACNC: NEGATIVE [IU]/ML
AST SERPL-CCNC: 16 U/L (ref 1–40)
BASOPHILS # BLD AUTO: 0.07 10*3/MM3 (ref 0–0.2)
BASOPHILS NFR BLD AUTO: 0.7 % (ref 0–1.5)
BILIRUB SERPL-MCNC: 0.3 MG/DL (ref 0–1.2)
BUN SERPL-MCNC: 22 MG/DL (ref 8–23)
BUN/CREAT SERPL: 21.4 (ref 7–25)
CALCIUM SPEC-SCNC: 10.5 MG/DL (ref 8.6–10.5)
CHLORIDE SERPL-SCNC: 100 MMOL/L (ref 98–107)
CHROMATIN AB SERPL-ACNC: 73 IU/ML (ref 0–14)
CO2 SERPL-SCNC: 27 MMOL/L (ref 22–29)
CREAT SERPL-MCNC: 1.03 MG/DL (ref 0.76–1.27)
CRP SERPL-MCNC: 1.75 MG/DL (ref 0–0.5)
DEPRECATED RDW RBC AUTO: 37.3 FL (ref 37–54)
EGFRCR SERPLBLD CKD-EPI 2021: 75.3 ML/MIN/1.73
EOSINOPHIL # BLD AUTO: 0.22 10*3/MM3 (ref 0–0.4)
EOSINOPHIL NFR BLD AUTO: 2.3 % (ref 0.3–6.2)
ERYTHROCYTE [DISTWIDTH] IN BLOOD BY AUTOMATED COUNT: 12.4 % (ref 12.3–15.4)
GLOBULIN UR ELPH-MCNC: 3.6 GM/DL
GLUCOSE SERPL-MCNC: 110 MG/DL (ref 65–99)
HCT VFR BLD AUTO: 43.3 % (ref 37.5–51)
HGB BLD-MCNC: 14.4 G/DL (ref 13–17.7)
IMM GRANULOCYTES # BLD AUTO: 0.04 10*3/MM3 (ref 0–0.05)
IMM GRANULOCYTES NFR BLD AUTO: 0.4 % (ref 0–0.5)
LYMPHOCYTES # BLD AUTO: 2.43 10*3/MM3 (ref 0.7–3.1)
LYMPHOCYTES NFR BLD AUTO: 25.7 % (ref 19.6–45.3)
MCH RBC QN AUTO: 27.5 PG (ref 26.6–33)
MCHC RBC AUTO-ENTMCNC: 33.3 G/DL (ref 31.5–35.7)
MCV RBC AUTO: 82.6 FL (ref 79–97)
MONOCYTES # BLD AUTO: 0.88 10*3/MM3 (ref 0.1–0.9)
MONOCYTES NFR BLD AUTO: 9.3 % (ref 5–12)
NEUTROPHILS NFR BLD AUTO: 5.81 10*3/MM3 (ref 1.7–7)
NEUTROPHILS NFR BLD AUTO: 61.6 % (ref 42.7–76)
NRBC BLD AUTO-RTO: 0 /100 WBC (ref 0–0.2)
PLATELET # BLD AUTO: 291 10*3/MM3 (ref 140–450)
PMV BLD AUTO: 10.3 FL (ref 6–12)
POTASSIUM SERPL-SCNC: 5 MMOL/L (ref 3.5–5.2)
PROT SERPL-MCNC: 8.1 G/DL (ref 6–8.5)
RBC # BLD AUTO: 5.24 10*6/MM3 (ref 4.14–5.8)
SODIUM SERPL-SCNC: 138 MMOL/L (ref 136–145)
T4 FREE SERPL-MCNC: 1.07 NG/DL (ref 0.93–1.7)
TSH SERPL DL<=0.05 MIU/L-ACNC: 3.65 UIU/ML (ref 0.27–4.2)
URATE SERPL-MCNC: 5.7 MG/DL (ref 3.4–7)
WBC NRBC COR # BLD AUTO: 9.45 10*3/MM3 (ref 3.4–10.8)

## 2024-02-28 PROCEDURE — 80053 COMPREHEN METABOLIC PANEL: CPT | Performed by: INTERNAL MEDICINE

## 2024-02-28 PROCEDURE — 86431 RHEUMATOID FACTOR QUANT: CPT | Performed by: INTERNAL MEDICINE

## 2024-02-28 PROCEDURE — 73130 X-RAY EXAM OF HAND: CPT

## 2024-02-28 PROCEDURE — 73560 X-RAY EXAM OF KNEE 1 OR 2: CPT

## 2024-02-28 PROCEDURE — 86747 PARVOVIRUS ANTIBODY: CPT | Performed by: INTERNAL MEDICINE

## 2024-02-28 PROCEDURE — 86038 ANTINUCLEAR ANTIBODIES: CPT | Performed by: INTERNAL MEDICINE

## 2024-02-28 PROCEDURE — 84550 ASSAY OF BLOOD/URIC ACID: CPT | Performed by: INTERNAL MEDICINE

## 2024-02-28 PROCEDURE — 84443 ASSAY THYROID STIM HORMONE: CPT | Performed by: INTERNAL MEDICINE

## 2024-02-28 PROCEDURE — 73030 X-RAY EXAM OF SHOULDER: CPT

## 2024-02-28 PROCEDURE — 85025 COMPLETE CBC W/AUTO DIFF WBC: CPT | Performed by: INTERNAL MEDICINE

## 2024-02-28 PROCEDURE — 84439 ASSAY OF FREE THYROXINE: CPT | Performed by: INTERNAL MEDICINE

## 2024-02-28 PROCEDURE — 86140 C-REACTIVE PROTEIN: CPT | Performed by: INTERNAL MEDICINE

## 2024-02-28 PROCEDURE — 86063 ANTISTREPTOLYSIN O SCREEN: CPT | Performed by: INTERNAL MEDICINE

## 2024-02-28 RX ORDER — MELOXICAM 15 MG/1
15 TABLET ORAL DAILY PRN
Qty: 30 TABLET | Refills: 2 | Status: SHIPPED | OUTPATIENT
Start: 2024-02-28

## 2024-02-28 NOTE — PATIENT INSTRUCTIONS
Continue NSAIDS (Meloxicam) for 2 full weeks, then as needed.  Continue heat and ice, 2-3 times daily.  Please call if no better in 2 weeks.

## 2024-02-28 NOTE — PROGRESS NOTES
John Ellis is a 78 y.o. male.     Chief Complaint   Patient presents with    Knee Pain     Left       History obtained from the patient.      History of Present Illness     The patient reports a 2 to 3-month history of daily joint pain, located in different sites of the body at different times.  Overall, episodes have not really changed.  These pain episodes can last one to several days and can become quite intense at times.  He reports the joints affected are both shoulders ( R>L) radiating to upper arms, both knees (L>R), and both hands/wrists/fingers.  He also has occasional right rib pain from his recent rib fractures.  He denies neck and back pain.  He reports occasional swelling and stiffness of the joints.  He denies numbness, tingling, and weakness.  He takes Tylenol, as well as using heat and ice, which help.  There is a family history of RA in a maternal grandmother.    The following portions of the patient's history were reviewed and updated as appropriate: allergies, current medications, past family history, past medical history, past social history, past surgical history, and problem list.      Review of Systems   Constitutional:  Negative for chills and fever.   Respiratory:  Negative for shortness of breath.    Cardiovascular:  Negative for chest pain.   Gastrointestinal:  Negative for abdominal pain.   Musculoskeletal:  Positive for arthralgias and joint swelling. Negative for back pain, myalgias, neck pain and neck stiffness.   Skin:  Negative for rash.        He denies itching   Neurological:  Negative for numbness.           Objective     Blood pressure 136/66, pulse 76, temperature 97.8 °F (36.6 °C), temperature source Infrared, resp. rate 16, weight 82.3 kg (181 lb 6 oz).    Physical Exam  Vitals and nursing note reviewed.   Constitutional:       Appearance: He is well-developed.      Comments: BMI greater than 25   Neck:      Comments: There is no tenderness to palpation  over the cervical spine or paraspinal muscles.  Cardiovascular:      Rate and Rhythm: Normal rate and regular rhythm.      Heart sounds: Normal heart sounds. No murmur heard.  Pulmonary:      Effort: Pulmonary effort is normal.      Breath sounds: Normal breath sounds.   Musculoskeletal:         General: Normal range of motion.      Cervical back: Normal range of motion and neck supple.      Comments: There is tenderness to palpation of both shoulders anteriorly.  There is pain with external rotation of both shoulders.  There is no pain with abduction, adduction, or internal rotation.  The patient is able to put both hands behind the head, but this causes pain.  He is able to put both hands behind the back and do the crossover maneuver without pain bilaterally.  Good  strength bilaterally.  There is no pain with bilateral straight leg raise.  There is no pain with bilateral hip flexion, extension, abduction, and adduction.  There is pain to palpation of the left medial knee.  There is no erythema, edema, or warmth of either knee.  There is no pain with flexion of the knees, but there is pain with extension of the knees.  There does not appear to be any knee instability.  There is bilateral knee crepitus.   Skin:     Findings: No rash.   Neurological:      Mental Status: He is alert.      Motor: Motor function is intact.      Deep Tendon Reflexes: Reflexes are normal and symmetric.      Comments: Upper extremity only examined.   Psychiatric:         Mood and Affect: Mood normal.           Assessment & Plan   Diagnoses and all orders for this visit:    1. Pain in joint, multiple sites (Primary)  -     CBC & Differential  -     TITO  -     Antistreptolysin O Screen  -     C-reactive Protein  -     Rheumatoid Factor  -     Uric Acid  -     Comprehensive Metabolic Panel  -     Parvovirus B19 Antibody, IgG & IgM  -     XR Knee 1 or 2 View Bilateral  -     XR Shoulder 2+ View Right  -     XR Hand 3+ View Bilateral  -      meloxicam (MOBIC) 15 MG tablet; Take 1 tablet by mouth Daily As Needed for Moderate Pain.  Dispense: 30 tablet; Refill: 2- NEW   The patient was instructed to continue NSAIDS (Meloxicam) for 2 full weeks, then as needed.    Continue heat and ice, 2-3 times daily.   He agrees to call if no better in 2 weeks.    2. Acute pain of both knees  -     XR Knee 1 or 2 View Bilateral    3. Bilateral hand pain  -     XR Hand 3+ View Bilateral    4. Chronic pain of both shoulders  -     XR Shoulder 2+ View Right    5. Elevated TSH- previously ordered  -     T4, Free  -     TSH    6. Hyperlipidemia LDL goal <70- previously ordered  -     T4, Free  -     TSH      Return if symptoms worsen or fail to improve.

## 2024-02-29 LAB — ANA SER QL: NEGATIVE

## 2024-02-29 NOTE — TELEPHONE ENCOUNTER
Call patient please.    All of his labs are not back, but his rheumatoid factor is positive which can be consistent with rheumatoid arthritis.  I would recommend a referral to rheumatology.  If he is agreeable, I will enter an order.

## 2024-03-04 LAB
B19V IGG SER IA-ACNC: 4.6 INDEX (ref 0–0.8)
B19V IGM SER IA-ACNC: 0.1 INDEX (ref 0–0.8)

## 2024-03-08 ENCOUNTER — NURSE TRIAGE (OUTPATIENT)
Dept: CALL CENTER | Facility: HOSPITAL | Age: 79
End: 2024-03-08
Payer: MEDICARE

## 2024-03-09 NOTE — TELEPHONE ENCOUNTER
"Reason for Disposition   Systolic BP  >= 180 OR Diastolic >= 110    Additional Information   Negative: Difficult to awaken or acting confused (e.g., disoriented, slurred speech)   Negative: SEVERE difficulty breathing (e.g., struggling for each breath, speaks in single words)   Negative: [1] Weakness of the face, arm or leg on one side of the body AND [2] new-onset   Negative: [1] Numbness (i.e., loss of sensation) of the face, arm or leg on one side of the body AND [2] new-onset   Negative: [1] Chest pain lasts > 5 minutes AND [2] history of heart disease (i.e., heart attack, bypass surgery, angina, angioplasty, CHF)   Negative: [1] Chest pain AND [2] took nitrogylcerin AND [3] pain was not relieved   Negative: Sounds like a life-threatening emergency to the triager   Negative: Symptom is main concern (e.g., headache, chest pain)   Negative: Low blood pressure is main concern   Negative: [1] Systolic BP  >= 160 OR Diastolic >= 100 AND [2] cardiac (e.g., breathing difficulty, chest pain) or neurologic symptoms (e.g., new-onset blurred or double vision, unsteady gait)   Negative: [1] Pregnant 20 or more weeks (or postpartum < 6 weeks) AND [2] new hand or face swelling   Negative: [1] Pregnant 20 or more weeks (or postpartum < 6 weeks) AND [2] Systolic BP >= 160 OR Diastolic >= 110   Negative: [1] Systolic BP  >= 200 OR Diastolic >= 120 AND [2] having NO cardiac or neurologic symptoms   Negative: [1] Pregnant 20 or more weeks (or postpartum < 6 weeks) AND [2] Systolic BP  >= 140 OR Diastolic >= 90   Negative: [1] Systolic BP  >= 180 OR Diastolic >= 110 AND [2] missed most recent dose of blood pressure medication    Answer Assessment - Initial Assessment Questions  1. BLOOD PRESSURE: \"What is the blood pressure?\" \"Did you take at least two measurements 5 minutes apart?\"      202/99 and 198/90  2. ONSET: \"When did you take your blood pressure?\"      Just prior to call and while on call  3. HOW: \"How did you take your " "blood pressure?\" (e.g., automatic home BP monitor, visiting nurse)      automatic  4. HISTORY: \"Do you have a history of high blood pressure?\"      yes  5. MEDICINES: \"Are you taking any medicines for blood pressure?\" \"Have you missed any doses recently?\"      Yes and PRN Clonidine  6. OTHER SYMPTOMS: \"Do you have any symptoms?\" (e.g., blurred vision, chest pain, difficulty breathing, headache, weakness)      Denies any symptoms  7. PREGNANCY: \"Is there any chance you are pregnant?\" \"When was your last menstrual period?\"      NA    Protocols used: Blood Pressure - High-ADULT-AH    "

## 2024-03-09 NOTE — TELEPHONE ENCOUNTER
Wife called to report elevated BP and patient had taken PRN Clonidine 15 min prior to call.  Instruction provided on giving medication time to work.  Instructed to seek medical attention if medication is not effective within 45 min - 1hr of administration or callback for further instruction.  Denies any symptoms at present.  BP - 202/99 and 198/90 with automatic cuff.  Instruction provided per protocol.

## 2024-05-14 ENCOUNTER — TELEPHONE (OUTPATIENT)
Age: 79
End: 2024-05-14
Payer: MEDICARE

## 2024-06-05 ENCOUNTER — TELEPHONE (OUTPATIENT)
Age: 79
End: 2024-06-05
Payer: MEDICARE

## 2024-06-05 NOTE — TELEPHONE ENCOUNTER
Patient called regarding lab results from 4/23/24. Advised these results were normal other than very mildly elevated glucose at 115. Patient asked about Anti CCP antibody, and I advised this was not something that is typically ordered every time. He wanted to know if he does have RA. Advised that per ADB's note he does have RA. Patient expressed understanding. Will scan 4/23/24 lab results into chart.

## 2024-06-10 ENCOUNTER — TELEPHONE (OUTPATIENT)
Age: 79
End: 2024-06-10
Payer: MEDICARE

## 2024-06-10 NOTE — TELEPHONE ENCOUNTER
Incoming Refill Request      Medication requested (name and dose): Hydroxychloroquine 200 mg tab    Pharmacy where request should be sent: [facility] on [address], [place]    Additional details provided by patient: Patient switched pharmacies.    Best call back number: Please notify patient through My Chart    Does the patient have less than a 3 day supply:  [] Yes  [x] No    Ann Gaming Rep  06/10/24, 09:20 EDT

## 2024-06-11 RX ORDER — HYDROXYCHLOROQUINE SULFATE 200 MG/1
200 TABLET, FILM COATED ORAL 2 TIMES DAILY
Qty: 60 TABLET | Refills: 3 | Status: SHIPPED | OUTPATIENT
Start: 2024-06-11

## 2024-06-11 RX ORDER — HYDROXYCHLOROQUINE SULFATE 200 MG/1
TABLET, FILM COATED ORAL
COMMUNITY
Start: 2024-05-15 | End: 2024-06-11 | Stop reason: SDUPTHER

## 2024-06-11 NOTE — TELEPHONE ENCOUNTER
Rx was sent to [facility] on [address] as requested at appointment on 4/26/24 with 5 refills. Will send to [facility] if that is what patient prefers.

## 2024-06-23 DIAGNOSIS — E78.5 HYPERLIPIDEMIA LDL GOAL <70: ICD-10-CM

## 2024-06-24 RX ORDER — PRAVASTATIN SODIUM 20 MG
20 TABLET ORAL DAILY
Qty: 30 TABLET | Refills: 0 | Status: SHIPPED | OUTPATIENT
Start: 2024-06-24

## 2024-07-08 RX ORDER — HYDRALAZINE HYDROCHLORIDE 25 MG/1
25 TABLET, FILM COATED ORAL 2 TIMES DAILY
Qty: 180 TABLET | Refills: 3 | Status: SHIPPED | OUTPATIENT
Start: 2024-07-08

## 2024-07-10 ENCOUNTER — OFFICE VISIT (OUTPATIENT)
Dept: INTERNAL MEDICINE | Facility: CLINIC | Age: 79
End: 2024-07-10
Payer: MEDICARE

## 2024-07-10 VITALS
WEIGHT: 180 LBS | RESPIRATION RATE: 16 BRPM | TEMPERATURE: 98 F | SYSTOLIC BLOOD PRESSURE: 132 MMHG | HEART RATE: 74 BPM | DIASTOLIC BLOOD PRESSURE: 70 MMHG | BODY MASS INDEX: 28.62 KG/M2

## 2024-07-10 DIAGNOSIS — I25.10 CORONARY ARTERY DISEASE INVOLVING NATIVE CORONARY ARTERY OF NATIVE HEART WITHOUT ANGINA PECTORIS: ICD-10-CM

## 2024-07-10 DIAGNOSIS — G25.81 RESTLESS LEGS SYNDROME (RLS): ICD-10-CM

## 2024-07-10 DIAGNOSIS — E04.1 SOLITARY NODULE OF RIGHT LOBE OF THYROID: ICD-10-CM

## 2024-07-10 DIAGNOSIS — N40.0 BENIGN PROSTATIC HYPERPLASIA, UNSPECIFIED WHETHER LOWER URINARY TRACT SYMPTOMS PRESENT: ICD-10-CM

## 2024-07-10 DIAGNOSIS — K63.5 BENIGN COLON POLYP: ICD-10-CM

## 2024-07-10 DIAGNOSIS — E78.5 DYSLIPIDEMIA: ICD-10-CM

## 2024-07-10 DIAGNOSIS — I10 PRIMARY HYPERTENSION: Primary | ICD-10-CM

## 2024-07-10 LAB
ALBUMIN SERPL-MCNC: 4.2 G/DL (ref 3.5–5.2)
ALBUMIN/GLOB SERPL: 1.2 G/DL
ALP SERPL-CCNC: 59 U/L (ref 39–117)
ALT SERPL W P-5'-P-CCNC: 14 U/L (ref 1–41)
ANION GAP SERPL CALCULATED.3IONS-SCNC: 14.7 MMOL/L (ref 5–15)
AST SERPL-CCNC: 18 U/L (ref 1–40)
BASOPHILS # BLD AUTO: 0.05 10*3/MM3 (ref 0–0.2)
BASOPHILS NFR BLD AUTO: 0.6 % (ref 0–1.5)
BILIRUB SERPL-MCNC: 0.3 MG/DL (ref 0–1.2)
BUN SERPL-MCNC: 16 MG/DL (ref 8–23)
BUN/CREAT SERPL: 18.6 (ref 7–25)
CALCIUM SPEC-SCNC: 9.6 MG/DL (ref 8.6–10.5)
CHLORIDE SERPL-SCNC: 100 MMOL/L (ref 98–107)
CHOLEST SERPL-MCNC: 184 MG/DL (ref 0–200)
CO2 SERPL-SCNC: 23.3 MMOL/L (ref 22–29)
CREAT SERPL-MCNC: 0.86 MG/DL (ref 0.76–1.27)
DEPRECATED RDW RBC AUTO: 38.6 FL (ref 37–54)
EGFRCR SERPLBLD CKD-EPI 2021: 89.7 ML/MIN/1.73
EOSINOPHIL # BLD AUTO: 0.32 10*3/MM3 (ref 0–0.4)
EOSINOPHIL NFR BLD AUTO: 3.8 % (ref 0.3–6.2)
ERYTHROCYTE [DISTWIDTH] IN BLOOD BY AUTOMATED COUNT: 13 % (ref 12.3–15.4)
GLOBULIN UR ELPH-MCNC: 3.4 GM/DL
GLUCOSE SERPL-MCNC: 101 MG/DL (ref 65–99)
HCT VFR BLD AUTO: 42.5 % (ref 37.5–51)
HDLC SERPL-MCNC: 42 MG/DL (ref 40–60)
HGB BLD-MCNC: 14 G/DL (ref 13–17.7)
IMM GRANULOCYTES # BLD AUTO: 0.03 10*3/MM3 (ref 0–0.05)
IMM GRANULOCYTES NFR BLD AUTO: 0.4 % (ref 0–0.5)
LDLC SERPL CALC-MCNC: 118 MG/DL (ref 0–100)
LDLC/HDLC SERPL: 2.75 {RATIO}
LYMPHOCYTES # BLD AUTO: 1.89 10*3/MM3 (ref 0.7–3.1)
LYMPHOCYTES NFR BLD AUTO: 22.2 % (ref 19.6–45.3)
MCH RBC QN AUTO: 27.4 PG (ref 26.6–33)
MCHC RBC AUTO-ENTMCNC: 32.9 G/DL (ref 31.5–35.7)
MCV RBC AUTO: 83.2 FL (ref 79–97)
MONOCYTES # BLD AUTO: 0.9 10*3/MM3 (ref 0.1–0.9)
MONOCYTES NFR BLD AUTO: 10.6 % (ref 5–12)
NEUTROPHILS NFR BLD AUTO: 5.33 10*3/MM3 (ref 1.7–7)
NEUTROPHILS NFR BLD AUTO: 62.4 % (ref 42.7–76)
NRBC BLD AUTO-RTO: 0 /100 WBC (ref 0–0.2)
PLATELET # BLD AUTO: 225 10*3/MM3 (ref 140–450)
PMV BLD AUTO: 10.7 FL (ref 6–12)
POTASSIUM SERPL-SCNC: 4.6 MMOL/L (ref 3.5–5.2)
PROT SERPL-MCNC: 7.6 G/DL (ref 6–8.5)
RBC # BLD AUTO: 5.11 10*6/MM3 (ref 4.14–5.8)
SODIUM SERPL-SCNC: 138 MMOL/L (ref 136–145)
TRIGL SERPL-MCNC: 132 MG/DL (ref 0–150)
TSH SERPL DL<=0.05 MIU/L-ACNC: 3.83 UIU/ML (ref 0.27–4.2)
VLDLC SERPL-MCNC: 24 MG/DL (ref 5–40)
WBC NRBC COR # BLD AUTO: 8.52 10*3/MM3 (ref 3.4–10.8)

## 2024-07-10 PROCEDURE — 3078F DIAST BP <80 MM HG: CPT | Performed by: INTERNAL MEDICINE

## 2024-07-10 PROCEDURE — 99214 OFFICE O/P EST MOD 30 MIN: CPT | Performed by: INTERNAL MEDICINE

## 2024-07-10 PROCEDURE — 1111F DSCHRG MED/CURRENT MED MERGE: CPT | Performed by: INTERNAL MEDICINE

## 2024-07-10 PROCEDURE — 36415 COLL VENOUS BLD VENIPUNCTURE: CPT | Performed by: INTERNAL MEDICINE

## 2024-07-10 PROCEDURE — 1159F MED LIST DOCD IN RCRD: CPT | Performed by: INTERNAL MEDICINE

## 2024-07-10 PROCEDURE — 84443 ASSAY THYROID STIM HORMONE: CPT | Performed by: INTERNAL MEDICINE

## 2024-07-10 PROCEDURE — 1160F RVW MEDS BY RX/DR IN RCRD: CPT | Performed by: INTERNAL MEDICINE

## 2024-07-10 PROCEDURE — 80061 LIPID PANEL: CPT | Performed by: INTERNAL MEDICINE

## 2024-07-10 PROCEDURE — 1126F AMNT PAIN NOTED NONE PRSNT: CPT | Performed by: INTERNAL MEDICINE

## 2024-07-10 PROCEDURE — 80053 COMPREHEN METABOLIC PANEL: CPT | Performed by: INTERNAL MEDICINE

## 2024-07-10 PROCEDURE — 3075F SYST BP GE 130 - 139MM HG: CPT | Performed by: INTERNAL MEDICINE

## 2024-07-10 PROCEDURE — G2211 COMPLEX E/M VISIT ADD ON: HCPCS | Performed by: INTERNAL MEDICINE

## 2024-07-10 PROCEDURE — 85025 COMPLETE CBC W/AUTO DIFF WBC: CPT | Performed by: INTERNAL MEDICINE

## 2024-07-10 NOTE — PROGRESS NOTES
John Ellis is a 78 y.o. male.     Chief Complaint   Patient presents with    Hypertension     6 month follow up    Hyperlipidemia       History obtained from the patient.      History of Present Illness     The patient was seen 11/27/2023 for rib pain.  X-rays showed mildly displaced right anterior eighth, ninth, and tenth ribs.  He is off Tramadol.  He reports mild intermittent pain every 2 to 3 days.     The patient has seen Dr. Blank for a right-sided Thyroid Nodule.  Ultrasound 2019 showed the nodule to be calcified, so biopsy was not recommended.  Thyroid ultrasound was stable (mildly suspicious) 1/22/21.  Last ultrasound was on 2/9/22: Benign colloid cyst within the lower pole the right lobe of the thyroid gland.  No suspicious solid thyroid nodule identified.. On 2/28/2024, TSH and T4 were normal.  He reports dry skin and occasional constipation.  He denies other thyroid symptoms.      The patient sees Dr. Jorgensen as needed for Restless Leg Syndrome, stable on Gabapentin and Sinemet, which she feels is ineffective. Last visit was 11/8/23.     The patient sees Dr. Francisco, Urology, once per year, for BPH, last visit 2/19/2024.  PSA on 1/10/2024 was normal (1.02).  He is not on medication.  He denies urinary symptoms.      The patient was diagnosed with Rheumatoid Arthritis on 2/20/2024 (multiple joint pain presentation and positive rheumatoid factor).  He has seen Dr. Cabrera on 3/14/2024 and 4/23/2024.  He is on Plaquenil.  He currently denies arthralgias.     Cardiac Follow-up: The patient is here for a follow-up visit.       His Hypertension has been stable.    Medications: Metoprolol, Losartan, and Hydralazine.  He is on Clonidine as needed.  His Hyperlipidemia has been stable.    LDL goal is < 70.  Last , .  Medication: Pravastatin and Co-Q10.  He has been prescribed Repatha, but has not started.  His Coronary Artery Disease (diagnosed in May 2022. NSTEMI 5/20/22) has  been stable.  Medication: Metoprolol, Pravastatin, Aspirin, and Losartan.  Plavix was discontinued on 11/6/2023.  Side Effects:  Myalgias (hips and shoulders), on Atorvastatin.  Reports myalgias of the shoulders with Pravastatin.     Procedures: On 5/20/2022, 2D Echocardiogram was normal, EF 60%.  On 5/20/2022 cardiac catheterization showed LVEF 50%, 90% RCA right posterolateral artery stenosis treated 2.5 x 12 Xience TABITHA, 40-50% mid RCA with normal IFR, 30% proximal LAD, Chronically occluded first diagonal with 3-4+ collaterals.     Interval Events: Pravastatin was started after last visit due to myalgias on Atorvastatin.  He states he stopped the Pravastatin for 1 week and his myalgias resolved.  He last saw Dr. Ugarte on 1/29/2024.  Per review of note, he was to be weaned off Hydralazine, but the patient states it was re-started.  He states his blood pressure at home has been  / 70's.       Symptoms: Has occasional lightheadedness and dizziness when his blood pressure is low.  Denies chest pain, shortness of breath, AQUINO, orthopnea, PND, palpitations, syncope, lower extremity edema, and claudication.    Associated Symptoms: No significant weight change in the past 6 months.  Reports myalgias (muscles in the bilateral shoulder areas with statins), but no arthralgias.  Denies fatigue, headache, polyuria, polydipsia, visual impairment, memory loss, concentration problems.     Lifestyle: The patient follows a diverse and healthy diet overall.  He walks twice daily.  He is active overall.   Tobacco Use:  Former Smoker.     Colon Polyps Follow-up: The patient is here for follow-up of Colon Polyps which has been stable.  Family History: Brother diagnosed with Stage IV Colon Cancer in his 40s.    Interval Events: Last Colonoscopy was done 1/17/2023, normal..  Symptoms: Reports occasional constipation.  Denies abdominal pain, diarrhea,  hematochezia, melena, or changes in the stool.    Medication: None.         Current Outpatient Medications on File Prior to Visit   Medication Sig Dispense Refill    acetaminophen (TYLENOL) 500 MG tablet Take 1 tablet by mouth Every 6 (Six) Hours As Needed for Mild Pain.      aspirin 81 MG EC tablet Take 1 tablet by mouth Daily. 90 tablet 1    carbidopa-levodopa (SINEMET)  MG per tablet Take 1 tablet by mouth Daily.      cloNIDine (CATAPRES) 0.1 MG tablet Take 1 tablet by mouth 2 (Two) Times a Day As Needed for High Blood Pressure (Systolic blood pressure consistently above 180). 6 tablet 0    coenzyme Q10 100 MG capsule Take 1 capsule by mouth Daily.      Evolocumab (REPATHA) solution auto-injector SureClick injection Inject 1 mL under the skin into the appropriate area as directed Every 14 (Fourteen) Days. 2 mL 11    gabapentin (NEURONTIN) 300 MG capsule Take 1 capsule by mouth 3 (Three) Times a Day As Needed (Leg pain). 90 capsule 5    hydrALAZINE (APRESOLINE) 25 MG tablet Take 1 tablet by mouth twice daily 180 tablet 3    hydroxychloroquine (PLAQUENIL) 200 MG tablet Take 1 tablet by mouth 2 (Two) Times a Day. 60 tablet 3    losartan (COZAAR) 50 MG tablet Take 1 tablet by mouth Daily. 90 tablet 3    meloxicam (MOBIC) 15 MG tablet Take 1 tablet by mouth Daily As Needed for Moderate Pain. 30 tablet 2    metoprolol tartrate (LOPRESSOR) 25 MG tablet TAKE 1 TABLET BY MOUTH EVERY 12 HOURS 180 tablet 3    pravastatin (PRAVACHOL) 20 MG tablet Take 1 tablet by mouth once daily 30 tablet 0     No current facility-administered medications on file prior to visit.       Current outpatient and discharge medications have been reconciled for the patient.  Reviewed by: Salome Knott MD        The following portions of the patient's history were reviewed and updated as appropriate: allergies, current medications, past family history, past medical history, past social history, past surgical history, and problem list.    Review of Systems   Constitutional:  Negative for fatigue and unexpected  weight change.   Eyes:  Negative for visual disturbance.   Respiratory:  Negative for cough, shortness of breath and wheezing.    Cardiovascular:  Negative for chest pain, palpitations and leg swelling.        No AQUINO, orthopnea, or claudication.   Gastrointestinal:  Positive for constipation. Negative for abdominal pain, blood in stool, diarrhea, nausea and vomiting.        Denies melena.   Endocrine: Negative for polydipsia, polyphagia and polyuria.   Genitourinary:  Negative for difficulty urinating, dysuria, frequency, hematuria and urgency.   Musculoskeletal:  Negative for arthralgias and myalgias.   Neurological:  Negative for dizziness, syncope, light-headedness and headaches.        No memory issues.   Psychiatric/Behavioral:  Negative for decreased concentration.          Objective       Blood pressure 132/70, pulse 74, temperature 98 °F (36.7 °C), temperature source Infrared, resp. rate 16, weight 81.6 kg (180 lb).  Body mass index is 28.62 kg/m².      Physical Exam  Vitals and nursing note reviewed.   Constitutional:       Appearance: He is well-developed.      Comments: BMI greater than 25.   Neck:      Thyroid: No thyroid mass or thyromegaly.      Vascular: No carotid bruit.   Cardiovascular:      Rate and Rhythm: Normal rate and regular rhythm.      Pulses: Normal pulses.      Heart sounds: Normal heart sounds. No murmur heard.     No friction rub. No gallop.   Pulmonary:      Effort: Pulmonary effort is normal.      Breath sounds: Normal breath sounds.   Musculoskeletal:      Right lower leg: No edema.      Left lower leg: No edema.   Neurological:      Mental Status: He is alert.   Psychiatric:         Mood and Affect: Mood normal.         Assessment / Plan:  Diagnoses and all orders for this visit:    1. Primary hypertension (Primary)  -     Lipid Panel  -     Comprehensive Metabolic Panel  -     TSH  -     CBC & Differential   Continue current medication(s) as noted in the history of present  illness.   I recommended he increase fluids to help with the low blood pressure issue.    2. Dyslipidemia  -     Lipid Panel  -     Comprehensive Metabolic Panel  -     TSH  -     CBC & Differential   Continue current medication(s) as noted in the history of present illness.    3. Coronary artery disease involving native coronary artery of native heart without angina pectoris  -     Lipid Panel  -     Comprehensive Metabolic Panel  -     TSH  -     CBC & Differential   Continue current medication(s) as noted in the history of present illness.   Follow up per Cardiology.    4. Benign colon polyp   Colonoscopy up-to-date.    5. Benign prostatic hyperplasia, unspecified whether lower urinary tract symptoms present   Stable, no medication.   Follow up per Urology.    6. Solitary nodule of right lobe of thyroid  -     TSH    8. Restless legs syndrome (RLS)   Continue current medication(s) as noted in the history of present illness.   Follow up per Neurology.   I recommended he discuss medication options at his next visit, since he does not feel the current ones are helping       I recommended the COVID-19 bivalent vaccine, RSV vaccine, Shingrix, and Hepatitis A Vaccine at the pharmacy.      I also recommended yearly Influenza vaccine at the pharmacy or in office.        Return in about 6 months (around 1/10/2025) for Recheck HTN, fasting.

## 2024-07-23 DIAGNOSIS — E78.5 HYPERLIPIDEMIA LDL GOAL <70: ICD-10-CM

## 2024-07-23 RX ORDER — PRAVASTATIN SODIUM 20 MG
20 TABLET ORAL DAILY
Qty: 30 TABLET | Refills: 0 | Status: SHIPPED | OUTPATIENT
Start: 2024-07-23

## 2024-07-30 ENCOUNTER — OFFICE VISIT (OUTPATIENT)
Age: 79
End: 2024-07-30
Payer: MEDICARE

## 2024-07-30 VITALS
HEART RATE: 84 BPM | TEMPERATURE: 97.6 F | WEIGHT: 180.4 LBS | DIASTOLIC BLOOD PRESSURE: 76 MMHG | BODY MASS INDEX: 28.31 KG/M2 | SYSTOLIC BLOOD PRESSURE: 130 MMHG | HEIGHT: 67 IN

## 2024-07-30 DIAGNOSIS — M05.79 RHEUMATOID ARTHRITIS INVOLVING MULTIPLE SITES WITH POSITIVE RHEUMATOID FACTOR: Primary | ICD-10-CM

## 2024-07-30 DIAGNOSIS — Z79.899 HIGH RISK MEDICATION USE: ICD-10-CM

## 2024-07-30 DIAGNOSIS — M15.9 GENERALIZED OSTEOARTHROSIS, INVOLVING MULTIPLE SITES: ICD-10-CM

## 2024-07-30 DIAGNOSIS — I25.10 CORONARY ARTERY DISEASE INVOLVING NATIVE CORONARY ARTERY OF NATIVE HEART WITHOUT ANGINA PECTORIS: ICD-10-CM

## 2024-07-30 PROBLEM — D84.821 IMMUNOSUPPRESSION DUE TO DRUG THERAPY: Status: ACTIVE | Noted: 2024-07-30

## 2024-07-30 PROCEDURE — 3075F SYST BP GE 130 - 139MM HG: CPT | Performed by: INTERNAL MEDICINE

## 2024-07-30 PROCEDURE — 3078F DIAST BP <80 MM HG: CPT | Performed by: INTERNAL MEDICINE

## 2024-07-30 PROCEDURE — 1160F RVW MEDS BY RX/DR IN RCRD: CPT | Performed by: INTERNAL MEDICINE

## 2024-07-30 PROCEDURE — 1159F MED LIST DOCD IN RCRD: CPT | Performed by: INTERNAL MEDICINE

## 2024-07-30 PROCEDURE — G2211 COMPLEX E/M VISIT ADD ON: HCPCS | Performed by: INTERNAL MEDICINE

## 2024-07-30 PROCEDURE — 99214 OFFICE O/P EST MOD 30 MIN: CPT | Performed by: INTERNAL MEDICINE

## 2024-07-30 RX ORDER — HYDROXYCHLOROQUINE SULFATE 200 MG/1
200 TABLET, FILM COATED ORAL 2 TIMES DAILY
Qty: 180 TABLET | Refills: 1 | Status: SHIPPED | OUTPATIENT
Start: 2024-07-30

## 2024-07-30 NOTE — ASSESSMENT & PLAN NOTE
Retired .  Grandmother with rheumatoid arthritis  + rheumatoid factor>100, +++ ACPA 239 (3/14/24)  Bilateral shoulder pain onset spring of 2023, late February 2024 sudden pain bilateral wrists that lasted 24 hours and then migrated to left knee for 24 hours  **Current:  Plaquenil 3/24, Meloxicam(outside).      Low disease activity from rheumatoid arthritis.  Swollen joint count 0.  Tender joint count 2.  Good prognosis  Mild shoulder pain today that he attributes to his statin medication.  Significant improvement since addition of Plaquenil 3/24.  No side effects to the Plaquenil.  Strongly encouraged Plaquenil eye exam.  Handout provided on rheumatoid arthritis and Plaquenil.    Recent labs July 2024 reviewed and stable.  Continue labs every 6 months for monitoring  Continue Plaquenil 200 mg twice daily  Return to clinic 6 months

## 2024-07-30 NOTE — PATIENT INSTRUCTIONS
Hydroxychloroquine Tablets    What is this medication?  HYDROXYCHLOROQUINE (cam drox ee KLOR oh kwin) treats autoimmune conditions, such as rheumatoid arthritis and lupus. It works by slowing down an overactive immune system. It may also be used to prevent and treat malaria. It works by killing the parasite that causes malaria. It belongs to a group of medications called DMARDs.  This medicine may be used for other purposes; ask your health care provider or pharmacist if you have questions.  COMMON BRAND NAME(S): Plaquenil, Quineprox    What should I tell my care team before I take this medication?  They need to know if you have any of these conditions:  Diabetes  Eye disease, vision problems  Frequently drink alcohol  G6PD deficiency  Heart disease  Irregular heartbeat or rhythm  Kidney disease  Liver disease  Porphyria  Psoriasis  An unusual or allergic reaction to hydroxychloroquine, other medications, foods, dyes, or preservatives  Pregnant or trying to get pregnant  Breastfeeding    How should I use this medication?  Take this medication by mouth with water. Take it as directed on the prescription label. Do not cut, crush, or chew this medication. Swallow the tablets whole. Take it with food. Do not take it more than directed. Take all of this medication unless your care team tells you to stop it early. Keep taking it even if you think you are better.  Take products with antacids in them at a different time of day than this medication. Take this medication 4 hours before or 4 hours after antacids. Talk to your care team if you have questions.  Talk to your care team about the use of this medication in children. While this medication may be prescribed for selected conditions, precautions do apply.  Overdosage: If you think you have taken too much of this medicine contact a poison control center or emergency room at once.  NOTE: This medicine is only for you. Do not share this medicine with others.    What if I  miss a dose?  If you miss a dose, take it as soon as you can. If it is almost time for your next dose, take only that dose. Do not take double or extra doses.    What may interact with this medication?  Do not take this medication with any of the following:  Cisapride  Dronedarone  Pimozide  Thioridazine  This medication may also interact with the following:  Ampicillin  Antacids  Cimetidine  Cyclosporine  Digoxin  Kaolin  Medications for diabetes, such as insulin, glipizide, glyburide  Medications for seizures, such as carbamazepine, phenobarbital, phenytoin  Mefloquine  Methotrexate  Other medications that cause heart rhythm changes  Praziquantel  This list may not describe all possible interactions. Give your health care provider a list of all the medicines, herbs, non-prescription drugs, or dietary supplements you use. Also tell them if you smoke, drink alcohol, or use illegal drugs. Some items may interact with your medicine.    What should I watch for while using this medication?  Visit your care team for regular checks on your progress. Tell your care team if your symptoms do not start to get better or if they get worse.  You may need blood work done while you are taking this medication. If you take other medications that can affect heart rhythm, you may need more testing. Talk to your care team if you have questions.  Your vision may be tested before and during use of this medication. Tell your care team right away if you have any change in your eyesight.  This medication may cause serious skin reactions. They can happen weeks to months after starting the medication. Contact your care team right away if you notice fevers or flu-like symptoms with a rash. The rash may be red or purple and then turn into blisters or peeling of the skin. Or, you might notice a red rash with swelling of the face, lips or lymph nodes in your neck or under your arms.  If you or your family notice any changes in your behavior, such  as new or worsening depression, thoughts of harming yourself, anxiety, or other unusual or disturbing thoughts, or memory loss, call your care team right away.    What side effects may I notice from receiving this medication?  Side effects that you should report to your care team as soon as possible:  Allergic reactions--skin rash, itching, hives, swelling of the face, lips, tongue, or throat  Aplastic anemia--unusual weakness or fatigue, dizziness, headache, trouble breathing, increased bleeding or bruising  Change in vision  Heart rhythm changes--fast or irregular heartbeat, dizziness, feeling faint or lightheaded, chest pain, trouble breathing  Infection--fever, chills, cough, or sore throat  Low blood sugar (hypoglycemia)--tremors or shaking, anxiety, sweating, cold or clammy skin, confusion, dizziness, rapid heartbeat  Muscle injury--unusual weakness or fatigue, muscle pain, dark yellow or brown urine, decrease in amount of urine  Pain, tingling, or numbness in the hands or feet  Rash, fever, and swollen lymph nodes  Redness, blistering, peeling, or loosening of the skin, including inside the mouth  Thoughts of suicide or self-harm, worsening mood, or feelings of depression  Unusual bruising or bleeding  Side effects that usually do not require medical attention (report to your care team if they continue or are bothersome):  Diarrhea  Headache  Nausea  Stomach pain  Vomiting  This list may not describe all possible side effects. Call your doctor for medical advice about side effects. You may report side effects to FDA at 2-457-FDA-8363.    Where should I keep my medication?  Keep out of the reach of children and pets.  Store at room temperature up to 30 degrees C (86 degrees F). Protect from light. Get rid of any unused medication after the expiration date.  To get rid of medications that are no longer needed or have :  Take the medication to a medication take-back program. Check with your pharmacy or  law enforcement to find a location.  If you cannot return the medication, check the label or package insert to see if the medication should be thrown out in the garbage or flushed down the toilet. If you are not sure, ask your care team. If it is safe to put it in the trash, empty the medication out of the container. Mix the medication with cat litter, dirt, coffee grounds, or other unwanted substance. Seal the mixture in a bag or container. Put it in the trash.  NOTE: This sheet is a summary. It may not cover all possible information. If you have questions about this medicine, talk to your doctor, pharmacist, or health care provider.  © 2024 [employer] (2023-06-26 00:00:00)

## 2024-07-30 NOTE — PROGRESS NOTES
Office Follow Up      Date: 07/30/2024   Patient Name: Jasiel Ellis  MRN: [id number]  YOB: 1945    Referring Physician: Salome Knott MD     Chief Complaint:   Chief Complaint   Patient presents with    Rheumatoid Arthritis       History of Present Illness: Jasiel Ellis is a 78 y.o. male who is here today for follow up on seropositive rheumatoid arthritis.      He reports that starting in the spring of 2023 he developed bilateral shoulder pain and stiffness this started in the right shoulder and spread to the left shoulder.  In late February 2024 he developed wrist pain this started in the right wrist and spreads at the left wrist.  This only lasted for 24 hours and then resolved on its own.  The joint pain then migrated to involve his left knee with pain and swelling behind the left knee that lasted 24 hours and then seemed to resolve on its own.      Today he reports significant improvement in joint pain on Plaquenil.  No side effects to Plaquenil.  He also has meloxicam from his PCP but rarely takes it.  He is trying to minimize his use of NSAIDs with his history of coronary disease      Subjective       Review of Systems: Review of Systems   Constitutional:  Negative for chills, fatigue, fever and unexpected weight loss.   HENT:  Negative for mouth sores, sinus pressure and sore throat.         Dry mouth  Nose sores   Eyes:  Negative for pain and redness.        Dry eyes   Respiratory:  Negative for cough and shortness of breath.    Cardiovascular:  Negative for chest pain.   Gastrointestinal:  Negative for abdominal pain, blood in stool, diarrhea, nausea, vomiting and GERD.   Endocrine: Negative for polydipsia and polyuria.   Genitourinary:  Negative for dysuria, genital sores and hematuria.   Musculoskeletal:  Positive for myalgias. Negative for arthralgias, back pain, joint swelling, neck pain and neck stiffness.   Skin:  Negative for rash and bruise.         Psoriasis  Photosensitvity  Malar rash   Allergic/Immunologic: Negative for immunocompromised state.   Neurological:  Negative for seizures, weakness, numbness and memory problem.   Hematological:  Negative for adenopathy. Does not bruise/bleed easily.   Psychiatric/Behavioral:  Negative for depressed mood. The patient is not nervous/anxious.         Medications:   Current Outpatient Medications:     acetaminophen (TYLENOL) 500 MG tablet, Take 1 tablet by mouth Every 6 (Six) Hours As Needed for Mild Pain., Disp: , Rfl:     aspirin 81 MG EC tablet, Take 1 tablet by mouth Daily., Disp: 90 tablet, Rfl: 1    carbidopa-levodopa (SINEMET)  MG per tablet, Take 1 tablet by mouth Daily., Disp: , Rfl:     cloNIDine (CATAPRES) 0.1 MG tablet, Take 1 tablet by mouth 2 (Two) Times a Day As Needed for High Blood Pressure (Systolic blood pressure consistently above 180)., Disp: 6 tablet, Rfl: 0    clopidogrel (PLAVIX) 75 MG tablet, Take 1 tablet by mouth Daily., Disp: , Rfl:     coenzyme Q10 100 MG capsule, Take 1 capsule by mouth Daily., Disp: , Rfl:     gabapentin (NEURONTIN) 300 MG capsule, Take 1 capsule by mouth 3 (Three) Times a Day As Needed (Leg pain)., Disp: 90 capsule, Rfl: 5    hydrALAZINE (APRESOLINE) 25 MG tablet, Take 1 tablet by mouth twice daily, Disp: 180 tablet, Rfl: 3    hydroxychloroquine (PLAQUENIL) 200 MG tablet, Take 1 tablet by mouth 2 (Two) Times a Day., Disp: 180 tablet, Rfl: 1    losartan (COZAAR) 50 MG tablet, Take 1 tablet by mouth Daily., Disp: 90 tablet, Rfl: 3    meloxicam (MOBIC) 15 MG tablet, Take 1 tablet by mouth Daily As Needed for Moderate Pain., Disp: 30 tablet, Rfl: 2    metoprolol tartrate (LOPRESSOR) 25 MG tablet, TAKE 1 TABLET BY MOUTH EVERY 12 HOURS, Disp: 180 tablet, Rfl: 3    pravastatin (PRAVACHOL) 20 MG tablet, Take 1 tablet by mouth once daily, Disp: 30 tablet, Rfl: 0    traMADol (ULTRAM) 50 MG tablet, Take 0.5 tablets by mouth Every 6 (Six) Hours As Needed for Moderate Pain.,  "Disp: , Rfl:     Allergies:   Allergies   Allergen Reactions    Atorvastatin Myalgia    Crestor [Rosuvastatin] Myalgia       I have reviewed and updated the patient's chief complaint, history of present illness, review of systems, past medical history, surgical history, family history, social history, medications and allergy list as appropriate.     Objective        Vital Signs:   Vitals:    07/30/24 0933   BP: 130/76   BP Location: Left arm   Patient Position: Sitting   Cuff Size: Adult   Pulse: 84   Temp: 97.6 °F (36.4 °C)   Weight: 81.8 kg (180 lb 6.4 oz)   Height: 168.9 cm (66.5\")   PainSc:   2     Body mass index is 28.68 kg/m².      Physical Exam:  Physical Exam   MUSCULOSKELETAL:   No peripheral synovitis  No dactylitis.  No pitting of the nails.  No tenderness hands or wrists.  No rheumatoid nodules or tophi.  Bilateral shoulders with mild tenderness with full range of motion.  No warmth or effusions.  No tenderness cervical lumbar spine.  No tenderness hips.  Knees with slight crepitus.  No warmth or effusion to the knees.  No synovitis ankles or feet    Complete joint exam was performed including the MCPs, PIPs, DIPs of the hands, wrists, elbows, shoulders, hips, knees and ankles.  No soft tissue swelling or tenderness is present except as above.    General: The patient is well-developed and well nourished. Cooperative, alert and oriented. Affect is normal. Hydration appears normal.   HEENT: Normocephalic and atraumatic. No notable alopecia. Lids and conjunctiva are normal. Pupils are equal and sclera are clear. Oropharynx is clear   NECK neck is supple without adenopathy, masses or thyromegaly.   CARDIOVASCULAR: Regular rate and rhythm. No murmurs, rubs or gallops   LUNGS: Effort is normal. Lungs are clear bilateral   ABDOMEN: Not examined  EXTREMITIES: Peripheral pulses are intact. No clubbing.   SKIN: No rashes. No subcutaneous nodules. No digital ulcers. No sclerodactyly.   NEUROLOGIC: Gait is normal. " "Strength testing is normal.  No focal neurologic deficits    Results Review:   Labs:   Lab Results   Component Value Date    GLUCOSE 101 (H) 07/10/2024    BUN 16 07/10/2024    CREATININE 0.86 07/10/2024    EGFR 89.7 07/10/2024    BCR 18.6 07/10/2024    K 4.6 07/10/2024    CO2 23.3 07/10/2024    CALCIUM 9.6 07/10/2024    ALBUMIN 4.2 07/10/2024    BILITOT 0.3 07/10/2024    AST 18 07/10/2024    ALT 14 07/10/2024     Lab Results   Component Value Date    WBC 8.52 07/10/2024    HGB 14.0 07/10/2024    HCT 42.5 07/10/2024    MCV 83.2 07/10/2024     07/10/2024     No results found for: \"SEDRATE\"  Lab Results   Component Value Date    CRP 1.75 (H) 02/28/2024     No results found for: \"QUANTIFERO\", \"QUANTITB1\", \"QUANTITB2\", \"QUANTIFERN\", \"QUANTIFERM\", \"QUANTITBGLDP\"  No results found for: \"RF\"  Lab Results   Component Value Date    HEPCVIRUSABY Non-Reactive 06/29/2018         Procedures    Assessment / Plan        Assessment & Plan  Rheumatoid arthritis involving multiple sites with positive rheumatoid factor  Retired .  Grandmother with rheumatoid arthritis  + rheumatoid factor>100, +++ ACPA 239 (3/14/24)  Bilateral shoulder pain onset spring of 2023, late February 2024 sudden pain bilateral wrists that lasted 24 hours and then migrated to left knee for 24 hours  **Current:  Plaquenil 3/24, Meloxicam(outside).      Low disease activity from rheumatoid arthritis.  Swollen joint count 0.  Tender joint count 2.  Good prognosis  Mild shoulder pain today that he attributes to his statin medication.  Significant improvement since addition of Plaquenil 3/24.  No side effects to the Plaquenil.  Strongly encouraged Plaquenil eye exam.  Handout provided on rheumatoid arthritis and Plaquenil.    Recent labs July 2024 reviewed and stable.  Continue labs every 6 months for monitoring  Continue Plaquenil 200 mg twice daily  Return to clinic 6 months    High risk medication use  Hydroxychloroquine  Hepatitis panel - " 3/14/2024    Well-tolerated and effective  I discussed the side effects of hydroxychloroquine including but not limited to GI upset, rash, photosensitivity, Hematologic and liver abnormalities and ocular abnormalities and need for frequent eye exam for toxicity monitoring.  Generalized osteoarthrosis, involving multiple sites  Minimizes NSAID use with underlying coronary disease  Coronary artery disease involving native coronary artery of native heart without angina pectoris  Status post stenting.  Cardiology Dr. Ugarte     New Medications Ordered This Visit   Medications    hydroxychloroquine (PLAQUENIL) 200 MG tablet     Sig: Take 1 tablet by mouth 2 (Two) Times a Day.     Dispense:  180 tablet     Refill:  1           Follow Up:   Return in about 6 months (around 1/30/2025).        Arias Cabrera MD  [facility]

## 2024-07-30 NOTE — ASSESSMENT & PLAN NOTE
Hydroxychloroquine  Hepatitis panel - 3/14/2024    Well-tolerated and effective  I discussed the side effects of hydroxychloroquine including but not limited to GI upset, rash, photosensitivity, Hematologic and liver abnormalities and ocular abnormalities and need for frequent eye exam for toxicity monitoring.

## 2024-08-21 DIAGNOSIS — E78.5 HYPERLIPIDEMIA LDL GOAL <70: ICD-10-CM

## 2024-08-21 RX ORDER — PRAVASTATIN SODIUM 20 MG
20 TABLET ORAL DAILY
Qty: 30 TABLET | Refills: 0 | Status: SHIPPED | OUTPATIENT
Start: 2024-08-21

## 2024-09-18 DIAGNOSIS — E78.5 HYPERLIPIDEMIA LDL GOAL <70: ICD-10-CM

## 2024-09-18 RX ORDER — PRAVASTATIN SODIUM 20 MG
20 TABLET ORAL DAILY
Qty: 30 TABLET | Refills: 0 | Status: SHIPPED | OUTPATIENT
Start: 2024-09-18

## 2024-10-22 DIAGNOSIS — I10 ESSENTIAL HYPERTENSION: ICD-10-CM

## 2024-10-22 RX ORDER — LOSARTAN POTASSIUM 50 MG/1
50 TABLET ORAL DAILY
Qty: 90 TABLET | Refills: 0 | Status: SHIPPED | OUTPATIENT
Start: 2024-10-22

## 2024-11-08 NOTE — PROGRESS NOTES
Subjective:     Encounter Date:11/11/2024    Primary Care Physician: Salome Knott MD      Patient ID: Jasiel Ellis is a 79 y.o. male.    Chief Complaint:Coronary artery disease involving native coronary artery of    PROBLEM LIST:  Coronary artery disease involving native coronary artery of native heart:  AAA US, 07/25/2019: Patent nonaneurysmal abdominal aorta.   Echo, 05/20/2022: EF 60%.   Cardiac catheterization, 05/20/2022: 90% right posterolateral artery stenosis treated 2.5 x 12 Xience TABITHA. Chronically occluded first diagonal with 3-4+ collaterals. 40-50% mid RCA with normal IFR. 30% proximal LAD. LVEF 50%.  MCOT, 09/30/2022: No A. fib. PVC iban 8%. PAC burden 2%.  Essential hypertension.  Hyperlipidemia LDL goal <70.  Former smoker.       Past Surgical History:   Procedure Laterality Date    BLADDER STONE REMOVAL OPEN  2016    [facility]    CARDIAC CATHETERIZATION N/A 05/20/2022    Procedure: Left Heart Cath;  Surgeon: Stef Ugarte MD;  Location: [facility] CATH INVASIVE LOCATION;  Service: Cardiovascular;  Laterality: N/A;    CORONARY STENT PLACEMENT  May 2022    INGUINAL HERNIA REPAIR Right 1998    mesh placed (approx date)    LASER OF PROSTATE W/ GREEN LIGHT PVP  2016    [facility]       Allergies   Allergen Reactions    Atorvastatin Myalgia    Crestor [Rosuvastatin] Myalgia         Current Outpatient Medications:     acetaminophen (TYLENOL) 500 MG tablet, Take 1 tablet by mouth Every 6 (Six) Hours As Needed for Mild Pain., Disp: , Rfl:     aspirin 81 MG EC tablet, Take 1 tablet by mouth Daily., Disp: 90 tablet, Rfl: 1    carbidopa-levodopa (SINEMET)  MG per tablet, Take 1 tablet by mouth Daily., Disp: , Rfl:     cloNIDine (CATAPRES) 0.1 MG tablet, Take 1 tablet by mouth 2 (Two) Times a Day As Needed for High Blood Pressure (Systolic blood pressure consistently above 180)., Disp: 6 tablet, Rfl: 0    clopidogrel (PLAVIX) 75 MG tablet, Take 1 tablet by mouth Daily., Disp: , Rfl:     coenzyme Q10 100 MG  capsule, Take 1 capsule by mouth Daily., Disp: , Rfl:     gabapentin (NEURONTIN) 300 MG capsule, Take 1 capsule by mouth 3 (Three) Times a Day As Needed (Leg pain)., Disp: 90 capsule, Rfl: 5    hydrALAZINE (APRESOLINE) 25 MG tablet, Take 1 tablet by mouth twice daily (Patient taking differently: Take 1 tablet by mouth Daily.), Disp: 180 tablet, Rfl: 3    hydroxychloroquine (PLAQUENIL) 200 MG tablet, Take 1 tablet by mouth 2 (Two) Times a Day., Disp: 180 tablet, Rfl: 1    losartan (COZAAR) 50 MG tablet, Take 1 tablet by mouth once daily, Disp: 90 tablet, Rfl: 0    meloxicam (MOBIC) 15 MG tablet, Take 1 tablet by mouth Daily As Needed for Moderate Pain., Disp: 30 tablet, Rfl: 2    metoprolol tartrate (LOPRESSOR) 25 MG tablet, TAKE 1 TABLET BY MOUTH EVERY 12 HOURS, Disp: 180 tablet, Rfl: 3    pravastatin (PRAVACHOL) 20 MG tablet, Take 1 tablet by mouth once daily, Disp: 30 tablet, Rfl: 0    traMADol (ULTRAM) 50 MG tablet, Take 0.5 tablets by mouth Every 6 (Six) Hours As Needed for Moderate Pain., Disp: , Rfl:         History of Present Illness    Patient is a 79-year-old  male who presents today for annual follow-up of coronary artery disease.  Since last being seen he notes overall doing well from cardiac standpoint.  He denies any chest pain, pressure, tightness.  Denies any increasing shortness of breath.  No reported syncope, presyncope, or edema.  Has concerns with his heart rate being predominantly in the 50s and would like to decrease his medications if possible.    The following portions of the patient's history were reviewed and updated as appropriate: allergies, current medications, past family history, past medical history, past social history, past surgical history and problem list.      Social History     Tobacco Use    Smoking status: Former     Current packs/day: 0.00     Average packs/day: 1 pack/day for 50.3 years (50.3 ttl pk-yrs)     Types: Cigarettes     Start date: 1/1/1965     Quit date:  "2015     Years since quittin.5     Passive exposure: Past    Smokeless tobacco: Never   Vaping Use    Vaping status: Never Used   Substance Use Topics    Alcohol use: Yes     Alcohol/week: 3.0 standard drinks of alcohol     Types: 3 Cans of beer per week     Comment: 2 beers per week    Drug use: Never         ROS       Objective:   /76 (BP Location: Left arm, Patient Position: Sitting, Cuff Size: Adult)   Pulse 57   Ht 167.6 cm (66\")   Wt 83.9 kg (185 lb)   SpO2 98%   BMI 29.86 kg/m²         Vitals reviewed.   Constitutional:       Appearance: Healthy appearance. Well-developed and not in distress.   Neck:      Vascular: No JVD.      Trachea: No tracheal deviation.   Pulmonary:      Effort: Pulmonary effort is normal.      Breath sounds: Normal breath sounds.   Cardiovascular:      Normal rate. Regular rhythm.      Murmurs: There is no murmur.      Comments: Bilateral carotid bruit  Edema:     Peripheral edema absent.   Musculoskeletal:         General: No deformity. Skin:     General: Skin is warm and dry.   Neurological:      Mental Status: Alert and oriented to person, place, and time.         Procedures          Assessment:   Assessment & Plan      Diagnoses and all orders for this visit:    1. Coronary artery disease involving native coronary artery of native heart without angina pectoris (Primary), stable.  No angina.  On aspirin.    2. Bilateral carotid bruits, no previous evaluation.  -     Duplex Carotid Ultrasound CAR; Future    3. Essential hypertension, controlled.  On beta-blocker and losartan.    4. Hyperlipidemia LDL goal <70, on statin.  Most recent LDL above goal.  Noted statin intolerances.  However, currently tolerating low-dose pravastatin.      Plan:  Patient is overall stable from cardiac standpoint.  Instructed patient he may decrease his Lopressor to 12.5 mg twice daily.  Patient notes he is due for repeat lipid panel in January.  If his LDL at that time is still above " goal would recommend initiating Zetia 10 mg daily.  Will check carotid duplex in the near term to evaluate bruits.  Continue other current cardiac regimen.  Follow-up in 1 years time or sooner if needed.       Lacey BRADY             Dictated utilizing Dragon dictation

## 2024-11-11 ENCOUNTER — OFFICE VISIT (OUTPATIENT)
Dept: CARDIOLOGY | Facility: CLINIC | Age: 79
End: 2024-11-11
Payer: MEDICARE

## 2024-11-11 VITALS
DIASTOLIC BLOOD PRESSURE: 76 MMHG | OXYGEN SATURATION: 98 % | HEART RATE: 57 BPM | BODY MASS INDEX: 29.73 KG/M2 | SYSTOLIC BLOOD PRESSURE: 130 MMHG | HEIGHT: 66 IN | WEIGHT: 185 LBS

## 2024-11-11 DIAGNOSIS — R09.89 BILATERAL CAROTID BRUITS: ICD-10-CM

## 2024-11-11 DIAGNOSIS — I25.10 CORONARY ARTERY DISEASE INVOLVING NATIVE CORONARY ARTERY OF NATIVE HEART WITHOUT ANGINA PECTORIS: Primary | ICD-10-CM

## 2024-11-11 DIAGNOSIS — I25.110 CORONARY ARTERY DISEASE INVOLVING NATIVE CORONARY ARTERY OF NATIVE HEART WITH UNSTABLE ANGINA PECTORIS: ICD-10-CM

## 2024-11-11 DIAGNOSIS — E78.5 HYPERLIPIDEMIA LDL GOAL <70: ICD-10-CM

## 2024-11-11 DIAGNOSIS — I10 ESSENTIAL HYPERTENSION: ICD-10-CM

## 2024-11-11 PROCEDURE — 99214 OFFICE O/P EST MOD 30 MIN: CPT | Performed by: NURSE PRACTITIONER

## 2024-11-11 PROCEDURE — 3075F SYST BP GE 130 - 139MM HG: CPT | Performed by: NURSE PRACTITIONER

## 2024-11-11 PROCEDURE — 1159F MED LIST DOCD IN RCRD: CPT | Performed by: NURSE PRACTITIONER

## 2024-11-11 PROCEDURE — G2211 COMPLEX E/M VISIT ADD ON: HCPCS | Performed by: NURSE PRACTITIONER

## 2024-11-11 PROCEDURE — 1160F RVW MEDS BY RX/DR IN RCRD: CPT | Performed by: NURSE PRACTITIONER

## 2024-11-11 PROCEDURE — 3078F DIAST BP <80 MM HG: CPT | Performed by: NURSE PRACTITIONER

## 2024-11-11 NOTE — LETTER
November 11, 2024     Salome Knott MD  [address]  [facility] [address]    Patient: Jasiel Ellis   YOB: 1945   Date of Visit: 11/11/2024     Dear Salome Knott MD:       Thank you for referring Jasiel Ellis to me for evaluation. Below are the relevant portions of my assessment and plan of care.    If you have questions, please do not hesitate to call me. I look forward to following Jasiel along with you.         Sincerely,        JOSE ANTONIO Weller        CC: No Recipients    Lacey Juarez APRN  11/11/24 1206  Sign when Signing Visit  Subjective:     Encounter Date:11/11/2024    Primary Care Physician: Salome Knott MD      Patient ID: Jasiel Ellis is a 79 y.o. male.    Chief Complaint:Coronary artery disease involving native coronary artery of    PROBLEM LIST:  Coronary artery disease involving native coronary artery of native heart:  AAA US, 07/25/2019: Patent nonaneurysmal abdominal aorta.   Echo, 05/20/2022: EF 60%.   Cardiac catheterization, 05/20/2022: 90% right posterolateral artery stenosis treated 2.5 x 12 Xience TABITHA. Chronically occluded first diagonal with 3-4+ collaterals. 40-50% mid RCA with normal IFR. 30% proximal LAD. LVEF 50%.  MCOT, 09/30/2022: No A. fib. PVC iban 8%. PAC burden 2%.  Essential hypertension.  Hyperlipidemia LDL goal <70.  Former smoker.       Past Surgical History:   Procedure Laterality Date   • BLADDER STONE REMOVAL OPEN  2016    [facility]   • CARDIAC CATHETERIZATION N/A 05/20/2022    Procedure: Left Heart Cath;  Surgeon: Stef Ugarte MD;  Location: [facility] CATH INVASIVE LOCATION;  Service: Cardiovascular;  Laterality: N/A;   • CORONARY STENT PLACEMENT  May 2022   • INGUINAL HERNIA REPAIR Right 1998    mesh placed (approx date)   • LASER OF PROSTATE W/ GREEN LIGHT PVP  2016    [facility]       Allergies   Allergen Reactions   • Atorvastatin Myalgia   • Crestor [Rosuvastatin] Myalgia         Current Outpatient Medications:   •   acetaminophen (TYLENOL) 500 MG tablet, Take 1 tablet by mouth Every 6 (Six) Hours As Needed for Mild Pain., Disp: , Rfl:   •  aspirin 81 MG EC tablet, Take 1 tablet by mouth Daily., Disp: 90 tablet, Rfl: 1  •  carbidopa-levodopa (SINEMET)  MG per tablet, Take 1 tablet by mouth Daily., Disp: , Rfl:   •  cloNIDine (CATAPRES) 0.1 MG tablet, Take 1 tablet by mouth 2 (Two) Times a Day As Needed for High Blood Pressure (Systolic blood pressure consistently above 180)., Disp: 6 tablet, Rfl: 0  •  clopidogrel (PLAVIX) 75 MG tablet, Take 1 tablet by mouth Daily., Disp: , Rfl:   •  coenzyme Q10 100 MG capsule, Take 1 capsule by mouth Daily., Disp: , Rfl:   •  gabapentin (NEURONTIN) 300 MG capsule, Take 1 capsule by mouth 3 (Three) Times a Day As Needed (Leg pain)., Disp: 90 capsule, Rfl: 5  •  hydrALAZINE (APRESOLINE) 25 MG tablet, Take 1 tablet by mouth twice daily (Patient taking differently: Take 1 tablet by mouth Daily.), Disp: 180 tablet, Rfl: 3  •  hydroxychloroquine (PLAQUENIL) 200 MG tablet, Take 1 tablet by mouth 2 (Two) Times a Day., Disp: 180 tablet, Rfl: 1  •  losartan (COZAAR) 50 MG tablet, Take 1 tablet by mouth once daily, Disp: 90 tablet, Rfl: 0  •  meloxicam (MOBIC) 15 MG tablet, Take 1 tablet by mouth Daily As Needed for Moderate Pain., Disp: 30 tablet, Rfl: 2  •  metoprolol tartrate (LOPRESSOR) 25 MG tablet, TAKE 1 TABLET BY MOUTH EVERY 12 HOURS, Disp: 180 tablet, Rfl: 3  •  pravastatin (PRAVACHOL) 20 MG tablet, Take 1 tablet by mouth once daily, Disp: 30 tablet, Rfl: 0  •  traMADol (ULTRAM) 50 MG tablet, Take 0.5 tablets by mouth Every 6 (Six) Hours As Needed for Moderate Pain., Disp: , Rfl:         History of Present Illness    Patient is a 79-year-old  male who presents today for annual follow-up of coronary artery disease.  Since last being seen he notes overall doing well from cardiac standpoint.  He denies any chest pain, pressure, tightness.  Denies any increasing shortness of breath.   "No reported syncope, presyncope, or edema.  Has concerns with his heart rate being predominantly in the 50s and would like to decrease his medications if possible.    The following portions of the patient's history were reviewed and updated as appropriate: allergies, current medications, past family history, past medical history, past social history, past surgical history and problem list.      Social History     Tobacco Use   • Smoking status: Former     Current packs/day: 0.00     Average packs/day: 1 pack/day for 50.3 years (50.3 ttl pk-yrs)     Types: Cigarettes     Start date: 1965     Quit date: 2015     Years since quittin.5     Passive exposure: Past   • Smokeless tobacco: Never   Vaping Use   • Vaping status: Never Used   Substance Use Topics   • Alcohol use: Yes     Alcohol/week: 3.0 standard drinks of alcohol     Types: 3 Cans of beer per week     Comment: 2 beers per week   • Drug use: Never         ROS       Objective:   /76 (BP Location: Left arm, Patient Position: Sitting, Cuff Size: Adult)   Pulse 57   Ht 167.6 cm (66\")   Wt 83.9 kg (185 lb)   SpO2 98%   BMI 29.86 kg/m²         Vitals reviewed.   Constitutional:       Appearance: Healthy appearance. Well-developed and not in distress.   Neck:      Vascular: No JVD.      Trachea: No tracheal deviation.   Pulmonary:      Effort: Pulmonary effort is normal.      Breath sounds: Normal breath sounds.   Cardiovascular:      Normal rate. Regular rhythm.      Murmurs: There is no murmur.      Comments: Bilateral carotid bruit  Edema:     Peripheral edema absent.   Musculoskeletal:         General: No deformity. Skin:     General: Skin is warm and dry.   Neurological:      Mental Status: Alert and oriented to person, place, and time.         Procedures          Assessment:   Assessment & Plan     Diagnoses and all orders for this visit:    1. Coronary artery disease involving native coronary artery of native heart without angina " pectoris (Primary), stable.  No angina.  On aspirin.    2. Bilateral carotid bruits, no previous evaluation.  -     Duplex Carotid Ultrasound CAR; Future    3. Essential hypertension, controlled.  On beta-blocker and losartan.    4. Hyperlipidemia LDL goal <70, on statin.  Most recent LDL above goal.  Noted statin intolerances.  However, currently tolerating low-dose pravastatin.      Plan:  Patient is overall stable from cardiac standpoint.  Instructed patient he may decrease his Lopressor to 12.5 mg twice daily.  Patient notes he is due for repeat lipid panel in January.  If his LDL at that time is still above goal would recommend initiating Zetia 10 mg daily.  Will check carotid duplex in the near term to evaluate bruits.  Continue other current cardiac regimen.  Follow-up in 1 years time or sooner if needed.       Lacey BRADY             Dictated utilizing Dragon dictation

## 2024-11-12 RX ORDER — METOPROLOL TARTRATE 25 MG/1
25 TABLET, FILM COATED ORAL EVERY 12 HOURS SCHEDULED
Qty: 180 TABLET | Refills: 0 | Status: SHIPPED | OUTPATIENT
Start: 2024-11-12

## 2024-11-17 DIAGNOSIS — E78.5 HYPERLIPIDEMIA LDL GOAL <70: ICD-10-CM

## 2024-11-18 RX ORDER — PRAVASTATIN SODIUM 20 MG
20 TABLET ORAL DAILY
Qty: 30 TABLET | Refills: 0 | Status: SHIPPED | OUTPATIENT
Start: 2024-11-18

## 2024-12-12 ENCOUNTER — HOSPITAL ENCOUNTER (OUTPATIENT)
Dept: CARDIOLOGY | Facility: HOSPITAL | Age: 79
Discharge: HOME | End: 2024-12-12
Admitting: NURSE PRACTITIONER
Payer: MEDICARE

## 2024-12-12 DIAGNOSIS — R09.89 BILATERAL CAROTID BRUITS: ICD-10-CM

## 2024-12-12 LAB
BH CV XLRA MEAS LEFT DIST CCA EDV: 14.7 CM/SEC
BH CV XLRA MEAS LEFT DIST CCA PSV: 54.5 CM/SEC
BH CV XLRA MEAS LEFT DIST ICA EDV: 22.4 CM/SEC
BH CV XLRA MEAS LEFT DIST ICA PSV: 76.5 CM/SEC
BH CV XLRA MEAS LEFT ICA/CCA RATIO: 1.57
BH CV XLRA MEAS LEFT MID CCA EDV: 13.9 CM/SEC
BH CV XLRA MEAS LEFT MID CCA PSV: 59.7 CM/SEC
BH CV XLRA MEAS LEFT MID ICA EDV: 25.4 CM/SEC
BH CV XLRA MEAS LEFT MID ICA PSV: 93.6 CM/SEC
BH CV XLRA MEAS LEFT PROX CCA EDV: 16.5 CM/SEC
BH CV XLRA MEAS LEFT PROX CCA PSV: 70.5 CM/SEC
BH CV XLRA MEAS LEFT PROX ECA EDV: 15.3 CM/SEC
BH CV XLRA MEAS LEFT PROX ECA PSV: 98 CM/SEC
BH CV XLRA MEAS LEFT PROX ICA EDV: 22.5 CM/SEC
BH CV XLRA MEAS LEFT PROX ICA PSV: 83.1 CM/SEC
BH CV XLRA MEAS LEFT PROX SCLA PSV: 125.3 CM/SEC
BH CV XLRA MEAS LEFT VERTEBRAL A EDV: 11.7 CM/SEC
BH CV XLRA MEAS LEFT VERTEBRAL A PSV: 34.3 CM/SEC
BH CV XLRA MEAS RIGHT DIST CCA EDV: 10 CM/SEC
BH CV XLRA MEAS RIGHT DIST CCA PSV: 36.7 CM/SEC
BH CV XLRA MEAS RIGHT DIST ICA EDV: 17.3 CM/SEC
BH CV XLRA MEAS RIGHT DIST ICA PSV: 77 CM/SEC
BH CV XLRA MEAS RIGHT ICA/CCA RATIO: 2.28
BH CV XLRA MEAS RIGHT MID CCA EDV: 10.1 CM/SEC
BH CV XLRA MEAS RIGHT MID CCA PSV: 39.4 CM/SEC
BH CV XLRA MEAS RIGHT MID ICA EDV: 27.3 CM/SEC
BH CV XLRA MEAS RIGHT MID ICA PSV: 90 CM/SEC
BH CV XLRA MEAS RIGHT PROX CCA EDV: 7.7 CM/SEC
BH CV XLRA MEAS RIGHT PROX CCA PSV: 61.5 CM/SEC
BH CV XLRA MEAS RIGHT PROX ECA EDV: 10.4 CM/SEC
BH CV XLRA MEAS RIGHT PROX ECA PSV: 71.8 CM/SEC
BH CV XLRA MEAS RIGHT PROX ICA EDV: 19.9 CM/SEC
BH CV XLRA MEAS RIGHT PROX ICA PSV: 64.1 CM/SEC
BH CV XLRA MEAS RIGHT PROX SCLA PSV: 151.8 CM/SEC
BH CV XLRA MEAS RIGHT VERTEBRAL A EDV: 13.3 CM/SEC
BH CV XLRA MEAS RIGHT VERTEBRAL A PSV: 40.6 CM/SEC
LEFT ARM BP: NORMAL MMHG
RIGHT ARM BP: NORMAL MMHG

## 2024-12-12 PROCEDURE — 93880 EXTRACRANIAL BILAT STUDY: CPT

## 2024-12-14 DIAGNOSIS — E78.5 HYPERLIPIDEMIA LDL GOAL <70: ICD-10-CM

## 2024-12-16 RX ORDER — PRAVASTATIN SODIUM 20 MG
20 TABLET ORAL DAILY
Qty: 30 TABLET | Refills: 0 | Status: SHIPPED | OUTPATIENT
Start: 2024-12-16

## 2025-01-13 ENCOUNTER — OFFICE VISIT (OUTPATIENT)
Dept: INTERNAL MEDICINE | Facility: CLINIC | Age: 80
End: 2025-01-13
Payer: MEDICARE

## 2025-01-13 VITALS
HEART RATE: 78 BPM | TEMPERATURE: 98.7 F | WEIGHT: 183 LBS | SYSTOLIC BLOOD PRESSURE: 130 MMHG | OXYGEN SATURATION: 98 % | RESPIRATION RATE: 20 BRPM | DIASTOLIC BLOOD PRESSURE: 84 MMHG | BODY MASS INDEX: 29.41 KG/M2 | HEIGHT: 66 IN

## 2025-01-13 DIAGNOSIS — N40.0 BENIGN PROSTATIC HYPERPLASIA, UNSPECIFIED WHETHER LOWER URINARY TRACT SYMPTOMS PRESENT: ICD-10-CM

## 2025-01-13 DIAGNOSIS — Z12.5 SCREENING FOR PROSTATE CANCER: ICD-10-CM

## 2025-01-13 DIAGNOSIS — K63.5 BENIGN COLON POLYP: ICD-10-CM

## 2025-01-13 DIAGNOSIS — I10 PRIMARY HYPERTENSION: ICD-10-CM

## 2025-01-13 DIAGNOSIS — I25.10 CORONARY ARTERY DISEASE INVOLVING NATIVE CORONARY ARTERY OF NATIVE HEART WITHOUT ANGINA PECTORIS: ICD-10-CM

## 2025-01-13 DIAGNOSIS — M05.79 RHEUMATOID ARTHRITIS INVOLVING MULTIPLE SITES WITH POSITIVE RHEUMATOID FACTOR: ICD-10-CM

## 2025-01-13 DIAGNOSIS — M15.0 PRIMARY OSTEOARTHRITIS INVOLVING MULTIPLE JOINTS: ICD-10-CM

## 2025-01-13 DIAGNOSIS — E04.1 SOLITARY NODULE OF RIGHT LOBE OF THYROID: ICD-10-CM

## 2025-01-13 DIAGNOSIS — E78.5 DYSLIPIDEMIA: Primary | ICD-10-CM

## 2025-01-13 PROBLEM — M19.90 OSTEOARTHRITIS: Status: ACTIVE | Noted: 2024-07-30

## 2025-01-13 PROBLEM — C44.311 BASAL CELL CARCINOMA (BCC) OF LEFT SIDE OF NOSE: Status: ACTIVE | Noted: 2025-01-13

## 2025-01-13 LAB
ALBUMIN SERPL-MCNC: 4.3 G/DL (ref 3.5–5.2)
ALBUMIN/GLOB SERPL: 1.3 G/DL
ALP SERPL-CCNC: 60 U/L (ref 39–117)
ALT SERPL W P-5'-P-CCNC: 23 U/L (ref 1–41)
ANION GAP SERPL CALCULATED.3IONS-SCNC: 10 MMOL/L (ref 5–15)
AST SERPL-CCNC: 22 U/L (ref 1–40)
BASOPHILS # BLD AUTO: 0.05 10*3/MM3 (ref 0–0.2)
BASOPHILS NFR BLD AUTO: 0.6 % (ref 0–1.5)
BILIRUB SERPL-MCNC: 0.4 MG/DL (ref 0–1.2)
BUN SERPL-MCNC: 17 MG/DL (ref 8–23)
BUN/CREAT SERPL: 19.1 (ref 7–25)
CALCIUM SPEC-SCNC: 9.3 MG/DL (ref 8.6–10.5)
CHLORIDE SERPL-SCNC: 102 MMOL/L (ref 98–107)
CHOLEST SERPL-MCNC: 138 MG/DL (ref 0–200)
CO2 SERPL-SCNC: 25 MMOL/L (ref 22–29)
CREAT SERPL-MCNC: 0.89 MG/DL (ref 0.76–1.27)
DEPRECATED RDW RBC AUTO: 36.6 FL (ref 37–54)
EGFRCR SERPLBLD CKD-EPI 2021: 88.3 ML/MIN/1.73
EOSINOPHIL # BLD AUTO: 0.19 10*3/MM3 (ref 0–0.4)
EOSINOPHIL NFR BLD AUTO: 2.3 % (ref 0.3–6.2)
ERYTHROCYTE [DISTWIDTH] IN BLOOD BY AUTOMATED COUNT: 12.4 % (ref 12.3–15.4)
GLOBULIN UR ELPH-MCNC: 3.4 GM/DL
GLUCOSE SERPL-MCNC: 92 MG/DL (ref 65–99)
HCT VFR BLD AUTO: 43.7 % (ref 37.5–51)
HDLC SERPL-MCNC: 42 MG/DL (ref 40–60)
HGB BLD-MCNC: 14.9 G/DL (ref 13–17.7)
IMM GRANULOCYTES # BLD AUTO: 0.02 10*3/MM3 (ref 0–0.05)
IMM GRANULOCYTES NFR BLD AUTO: 0.2 % (ref 0–0.5)
LDLC SERPL CALC-MCNC: 72 MG/DL (ref 0–100)
LDLC/HDLC SERPL: 1.62 {RATIO}
LYMPHOCYTES # BLD AUTO: 2.16 10*3/MM3 (ref 0.7–3.1)
LYMPHOCYTES NFR BLD AUTO: 26 % (ref 19.6–45.3)
MCH RBC QN AUTO: 28 PG (ref 26.6–33)
MCHC RBC AUTO-ENTMCNC: 34.1 G/DL (ref 31.5–35.7)
MCV RBC AUTO: 82 FL (ref 79–97)
MONOCYTES # BLD AUTO: 0.74 10*3/MM3 (ref 0.1–0.9)
MONOCYTES NFR BLD AUTO: 8.9 % (ref 5–12)
NEUTROPHILS NFR BLD AUTO: 5.14 10*3/MM3 (ref 1.7–7)
NEUTROPHILS NFR BLD AUTO: 62 % (ref 42.7–76)
NRBC BLD AUTO-RTO: 0 /100 WBC (ref 0–0.2)
PLATELET # BLD AUTO: 201 10*3/MM3 (ref 140–450)
PMV BLD AUTO: 11 FL (ref 6–12)
POTASSIUM SERPL-SCNC: 4.2 MMOL/L (ref 3.5–5.2)
PROT SERPL-MCNC: 7.7 G/DL (ref 6–8.5)
PSA SERPL-MCNC: 0.86 NG/ML (ref 0–4)
RBC # BLD AUTO: 5.33 10*6/MM3 (ref 4.14–5.8)
SODIUM SERPL-SCNC: 137 MMOL/L (ref 136–145)
TRIGL SERPL-MCNC: 139 MG/DL (ref 0–150)
TSH SERPL DL<=0.05 MIU/L-ACNC: 3.62 UIU/ML (ref 0.27–4.2)
VLDLC SERPL-MCNC: 24 MG/DL (ref 5–40)
WBC NRBC COR # BLD AUTO: 8.3 10*3/MM3 (ref 3.4–10.8)

## 2025-01-13 PROCEDURE — 1111F DSCHRG MED/CURRENT MED MERGE: CPT | Performed by: INTERNAL MEDICINE

## 2025-01-13 PROCEDURE — 84443 ASSAY THYROID STIM HORMONE: CPT | Performed by: INTERNAL MEDICINE

## 2025-01-13 PROCEDURE — 80053 COMPREHEN METABOLIC PANEL: CPT | Performed by: INTERNAL MEDICINE

## 2025-01-13 PROCEDURE — 80061 LIPID PANEL: CPT | Performed by: INTERNAL MEDICINE

## 2025-01-13 PROCEDURE — 36415 COLL VENOUS BLD VENIPUNCTURE: CPT | Performed by: INTERNAL MEDICINE

## 2025-01-13 PROCEDURE — 3079F DIAST BP 80-89 MM HG: CPT | Performed by: INTERNAL MEDICINE

## 2025-01-13 PROCEDURE — 1159F MED LIST DOCD IN RCRD: CPT | Performed by: INTERNAL MEDICINE

## 2025-01-13 PROCEDURE — 3075F SYST BP GE 130 - 139MM HG: CPT | Performed by: INTERNAL MEDICINE

## 2025-01-13 PROCEDURE — 85025 COMPLETE CBC W/AUTO DIFF WBC: CPT | Performed by: INTERNAL MEDICINE

## 2025-01-13 PROCEDURE — 1160F RVW MEDS BY RX/DR IN RCRD: CPT | Performed by: INTERNAL MEDICINE

## 2025-01-13 PROCEDURE — 1126F AMNT PAIN NOTED NONE PRSNT: CPT | Performed by: INTERNAL MEDICINE

## 2025-01-13 PROCEDURE — 99214 OFFICE O/P EST MOD 30 MIN: CPT | Performed by: INTERNAL MEDICINE

## 2025-01-13 PROCEDURE — G0103 PSA SCREENING: HCPCS | Performed by: INTERNAL MEDICINE

## 2025-01-13 RX ORDER — FLUOROURACIL 50 MG/G
CREAM TOPICAL
COMMUNITY
Start: 2024-08-13

## 2025-01-13 RX ORDER — CLOBETASOL PROPIONATE 0.5 MG/G
OINTMENT TOPICAL
COMMUNITY
Start: 2024-10-16

## 2025-01-13 RX ORDER — TRIAMCINOLONE ACETONIDE 1 MG/G
OINTMENT TOPICAL
COMMUNITY
Start: 2024-08-13

## 2025-01-13 NOTE — PATIENT INSTRUCTIONS
I recommend the RSV vaccine and the, Shingrix (Shingles vaccine) at the pharmacy, as we discussed.    I recommend the Influenza vaccine and the COVID-19 bivalent vaccine in office or, at the pharmacy, as we discussed.    MyPlate from USDA    MyPlate is an outline of a general healthy diet based on the Dietary Guidelines for Americans, 0675-9821, from the U.S. Department of Agriculture (USDA). It sets guidelines for how much food you should eat from each food group based on your age, sex, and level of physical activity.  What are tips for following MyPlate?  To follow MyPlate recommendations:  Eat a wide variety of fruits and vegetables, grains, and protein foods.  Serve smaller portions and eat less food throughout the day.  Limit portion sizes to avoid overeating.  Enjoy your food.  Get at least 150 minutes of exercise every week. This is about 30 minutes each day, 5 or more days per week.  It can be difficult to have every meal look like MyPlate. Think about MyPlate as eating guidelines for an entire day, rather than each individual meal.  Fruits and vegetables  Make one half of your plate fruits and vegetables.  Eat many different colors of fruits and vegetables each day.  For a 2,000-calorie daily food plan, eat:  2½ cups of vegetables every day.  2 cups of fruit every day.  1 cup is equal to:  1 cup raw or cooked vegetables.  1 cup raw fruit.  1 medium-sized orange, apple, or banana.  1 cup 100% fruit or vegetable juice.  2 cups raw leafy greens, such as lettuce, spinach, or kale.  ½ cup dried fruit.  Grains  One fourth of your plate should be grains.  Make at least half of the grains you eat each day whole grains.  For a 2,000-calorie daily food plan, eat 6 oz of grains every day.  1 oz is equal to:  1 slice bread.  1 cup cereal.  ½ cup cooked rice, cereal, or pasta.  Protein  One fourth of your plate should be protein.  Eat a wide variety of protein foods, including meat, poultry, fish, eggs, beans, nuts, and  tofu.  For a 2,000-calorie daily food plan, eat 5½ oz of protein every day.  1 oz is equal to:  1 oz meat, poultry, or fish.  ¼ cup cooked beans.  1 egg.  ½ oz nuts or seeds.  1 Tbsp peanut butter.  Dairy  Drink fat-free or low-fat (1%) milk.  Eat or drink dairy as a side to meals.  For a 2,000-calorie daily food plan, eat or drink 3 cups of dairy every day.  1 cup is equal to:  1 cup milk, yogurt, cottage cheese, or soy milk (soy beverage).  2 oz processed cheese.  1½ oz natural cheese.  Fats, oils, salt, and sugars  Only small amounts of oils are recommended.  Avoid foods that are high in calories and low in nutritional value (empty calories), like foods high in fat or added sugars.  Choose foods that are low in salt (sodium). Choose foods that have less than 140 milligrams (mg) of sodium per serving.  Drink water instead of sugary drinks. Drink enough fluid to keep your urine pale yellow.  Where to find support  Work with your health care provider or a dietitian to develop a customized eating plan that is right for you.  Download an stephany (mobile application) to help you track your daily food intake.  Where to find more information  USDA: ChooseMyPlate.gov  Summary  MyPlate is a general guideline for healthy eating from the USDA. It is based on the Dietary Guidelines for Americans, 7689-2749.  In general, fruits and vegetables should take up one half of your plate, grains should take up one fourth of your plate, and protein should take up one fourth of your plate.  This information is not intended to replace advice given to you by your health care provider. Make sure you discuss any questions you have with your health care provider.  Document Revised: 11/08/2021 Document Reviewed: 11/08/2021  Elsevier Patient Education © 2024 Elsevier Inc.    Exercising to Stay Healthy  To become healthy and stay healthy, it is recommended that you do moderate-intensity and vigorous-intensity exercise. You can tell that you are  exercising at a moderate intensity if your heart starts beating faster and you start breathing faster but can still hold a conversation. You can tell that you are exercising at a vigorous intensity if you are breathing much harder and faster and cannot hold a conversation while exercising.  How can exercise benefit me?  Exercising regularly is important. It has many health benefits, such as:  Improving overall fitness, flexibility, and endurance.  Increasing bone density.  Helping with weight control.  Decreasing body fat.  Increasing muscle strength and endurance.  Reducing stress and tension, anxiety, depression, or anger.  Improving overall health.  What guidelines should I follow while exercising?  Before you start a new exercise program, talk with your health care provider.  Do not exercise so much that you hurt yourself, feel dizzy, or get very short of breath.  Wear comfortable clothes and wear shoes with good support.  Drink plenty of water while you exercise to prevent dehydration or heat stroke.  Work out until your breathing and your heartbeat get faster (moderate intensity).  How often should I exercise?  Choose an activity that you enjoy, and set realistic goals. Your health care provider can help you make an activity plan that is individually designed and works best for you.  Exercise regularly as told by your health care provider. This may include:  Doing strength training two times a week, such as:  Lifting weights.  Using resistance bands.  Push-ups.  Sit-ups.  Yoga.  Doing a certain intensity of exercise for a given amount of time. Choose from these options:  A total of 150 minutes of moderate-intensity exercise every week.  A total of 75 minutes of vigorous-intensity exercise every week.  A mix of moderate-intensity and vigorous-intensity exercise every week.  Children, pregnant women, people who have not exercised regularly, people who are overweight, and older adults may need to talk with a  health care provider about what activities are safe to perform. If you have a medical condition, be sure to talk with your health care provider before you start a new exercise program.  What are some exercise ideas?  Moderate-intensity exercise ideas include:  Walking 1 mile (1.6 km) in about 15 minutes.  Biking.  Hiking.  Golfing.  Dancing.  Water aerobics.  Vigorous-intensity exercise ideas include:  Walking 4.5 miles (7.2 km) or more in about 1 hour.  Jogging or running 5 miles (8 km) in about 1 hour.  Biking 10 miles (16.1 km) or more in about 1 hour.  Lap swimming.  Roller-skating or in-line skating.  Cross-country skiing.  Vigorous competitive sports, such as football, basketball, and soccer.  Jumping rope.  Aerobic dancing.  What are some everyday activities that can help me get exercise?  Yard work, such as:  Pushing a .  Raking and bagging leaves.  Washing your car.  Pushing a stroller.  Shoveling snow.  Gardening.  Washing windows or floors.  How can I be more active in my day-to-day activities?  Use stairs instead of an elevator.  Take a walk during your lunch break.  If you drive, park your car farther away from your work or school.  If you take public transportation, get off one stop early and walk the rest of the way.  Stand up or walk around during all of your indoor phone calls.  Get up, stretch, and walk around every 30 minutes throughout the day.  Enjoy exercise with a friend. Support to continue exercising will help you keep a regular routine of activity.  Where to find more information  You can find more information about exercising to stay healthy from:  U.S. Department of Health and Human Services: www.hhs.gov  Centers for Disease Control and Prevention (CDC): www.cdc.gov  Summary  Exercising regularly is important. It will improve your overall fitness, flexibility, and endurance.  Regular exercise will also improve your overall health. It can help you control your weight, reduce  stress, and improve your bone density.  Do not exercise so much that you hurt yourself, feel dizzy, or get very short of breath.  Before you start a new exercise program, talk with your health care provider.  This information is not intended to replace advice given to you by your health care provider. Make sure you discuss any questions you have with your health care provider.  Document Revised: 04/15/2022 Document Reviewed: 04/15/2022  [employer] Patient Education © 2024 [employer]      Advance Care Planning and Advance Directives     You make decisions on a daily basis - decisions about where you want to live, your career, your home, your life. Perhaps one of the most important decisions you face is your choice for future medical care. Take time to talk with your family and your healthcare team and start planning today.  Advance Care Planning is a process that can help you:  Understand possible future healthcare decisions in light of your own experiences  Reflect on those decision in light of your goals and values  Discuss your decisions with those closest to you and the healthcare professionals that care for you  Make a plan by creating a document that reflects your wishes    Surrogate Decision Maker  In the event of a medical emergency, which has left you unable to communicate or to make your own decisions, you would need someone to make decisions for you.  It is important to discuss your preferences for medical treatment with this person while you are in good health.     Qualities of a surrogate decision maker:  Willing to take on this role and responsibility  Knows what you want for future medical care  Willing to follow your wishes even if they don't agree with them  Able to make difficult medical decisions under stressful circumstances    Advance Directives  These are legal documents you can create that will guide your healthcare team and decision maker(s) when needed. These documents can be stored in the  electronic medical record.    Living Will - a legal document to guide your care if you have a terminal condition or a serious illness and are unable to communicate. States vary by statute in document names/types, but most forms may include one or more of the following:        -  Directions regarding life-prolonging treatments        -  Directions regarding artificially provided nutrition/hydration        -  Choosing a healthcare decision maker        -  Direction regarding organ/tissue donation    Durable Power of  for Healthcare - this document names an -in-fact to make medical decisions for you, but it may also allow this person to make personal and financial decisions for you. Please seek the advice of an  if you need this type of document.    **Advance Directives are not required and no one may discriminate against you if you do not sign one.    Medical Orders  Many states allow specific forms/orders signed by your physician to record your wishes for medical treatment in your current state of health. This form, signed in personal communication with your physician, addresses resuscitation and other medical interventions that you may or may not want.      For more information or to schedule a time with a [facility] Advance Care Planning Facilitator contact: [email or web address] or call [phone] and someone will contact you directly.

## 2025-01-13 NOTE — PROGRESS NOTES
John Ellis is a 79 y.o. male.     Chief Complaint   Patient presents with    Hypertension     6-month follow-up    Hyperlipidemia       History obtained from the patient.      History of Present Illness     The patient was diagnosed with a BCC on the left nasal bridge in November 2024.  He has a Mohs procedure scheduled for March 15, 2025.  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  He is putting Efudex on his scalp.    The patient has Osteoarthritis, involving multiple joints.  The patient was diagnosed with Rheumatoid Arthritis in February 2024.  He is now seeing Dr. Cabrera, last visit 7/30/2024.  He reports his arthralgias have resolved on Plaquenil.  He takes Meloxicam as needed.  He has a follow-up appointment scheduled for 1/29/2025.    The patient has seen Dr. Blank for a right-sided Thyroid Nodule.  Ultrasound 2019 showed the nodule to be calcified, so biopsy was not recommended.  Thyroid ultrasound was stable (mildly suspicious) 1/22/21.  Thyroid ultrasound on 2/9/22 showed: Benign colloid cyst within the lower pole the right lobe of the thyroid gland.  No suspicious solid thyroid nodule identified..  Last thyroid ultrasound on 2/2/2024 showed:  Stable size and appearance of a subcentimeter TI-RADS 2 nodule in the inferior right thyroid lobe which is unchanged since 2020.  TI-RADS: TR2 - Not Suspicious. No follow up needed.  On 2/28/2024, TSH and T4 were normal.  On 7/10/2024, TSH was normal.  He reports dry skin and occasional constipation.  He denies other thyroid symptoms.      The patient sees Dr. Jorgensen as needed for Restless Leg Syndrome, stable on Gabapentin and Sinemet, which he feels is ineffective. Last visit was 11/6/2024.     The patient sees Dr. Francisco, Urology, once per year, for BPH, last visit 2/19/2024.  PSA on 1/10/2024 was normal (1.02).  He is not on medication.  He denies urinary symptoms.       Cardiac Follow-up:  The patient is here for a follow-up visit.       His Hypertension has been stable.    Medications: Metoprolol, Losartan, and Hydralazine.  He is on Clonidine as needed.  His Hyperlipidemia has been stable.    LDL goal is < 70.  Last , .  Medication: Pravastatin and Co-Q10.  He has been prescribed Repatha, but has not started.  His Coronary Artery Disease (diagnosed in May 2022. NSTEMI 5/20/22) has been stable.  Medication: Metoprolol, Pravastatin, Aspirin, and Losartan.  Plavix was discontinued on 11/6/2023.  Side Effects: None.  Myalgias (hips and shoulders), on Atorvastatin.      Procedures: On 5/20/2022, 2D Echocardiogram was normal, EF 60%.  On 5/20/2022 cardiac catheterization showed LVEF 50%, 90% RCA right posterolateral artery stenosis treated 2.5 x 12 Xience TABITHA, 40-50% mid RCA with normal IFR, 30% proximal LAD, Chronically occluded first diagonal with 3-4+ collaterals.    Interval Events: He sees Dr. Ugarte, last visit with JOSE ANTONIO Weller on 1/11/2024.  His Metoprolol was decreased to 12.5 mg  BID and Zetia was added, but he did not take it.  Carotid Dopplers were ordered due to finding of new bilateral carotid bruits.  It showed:  Right internal carotid artery demonstrates a less than 50% stenosis.  Left internal carotid artery demonstrates a less than 50% stenosis. Antegrade right and left vertebral flow.  .He is to follow-up in 1 year.  He states his blood pressure at home has been 120's / 60's-70's.       Symptoms:   Denies chest pain, shortness of breath, AQUINO, orthopnea, PND, palpitations, syncope, lower extremity edema, claudication, lightheadedness, and dizziness.  Associated Symptoms: Weight up 3 pounds in the past 6 months. Denies fatigue, headache, myalgias, arthralgias, polyuria, polydipsia, visual impairment, memory loss, and concentration problems.     Lifestyle: The patient follows a diverse and healthy diet overall (likes sweets).  He walks twice daily.  He is active  overall.   Tobacco Use:  Former Smoker.     Colon Polyps Follow-up: The patient is here for follow-up of Colon Polyps which has been stable.  Family History: Brother diagnosed with Stage IV Colon Cancer in his 40s.    Interval Events: Last Colonoscopy was done 1/17/2023, normal..  Symptoms: Reports occasional constipation.  Denies abdominal pain, diarrhea,  hematochezia, melena, or changes in the stool.    Medication: None.       Current Outpatient Medications on File Prior to Visit   Medication Sig Dispense Refill    acetaminophen (TYLENOL) 500 MG tablet Take 1 tablet by mouth Every 6 (Six) Hours As Needed for Mild Pain.      aspirin 81 MG EC tablet Take 1 tablet by mouth Daily. 90 tablet 1    carbidopa-levodopa (SINEMET)  MG per tablet Take 1 tablet by mouth Daily.      clobetasol (TEMOVATE) 0.05 % ointment APPLY A THIN LAYER TO THE AFFECTED AREA(S) BY TOPICAL ROUTE 2 TIMES PER DAY FOR UP TO 2 WEEKS. TAKE A 2 WEEK BREAK BEFORE REPEATING AS NEEDED      cloNIDine (CATAPRES) 0.1 MG tablet Take 1 tablet by mouth 2 (Two) Times a Day As Needed for High Blood Pressure (Systolic blood pressure consistently above 180). 6 tablet 0    coenzyme Q10 100 MG capsule Take 1 capsule by mouth Daily.      fluorouracil (Efudex) 5 % cream apply to the AA twice a day x 3 weeks      gabapentin (NEURONTIN) 300 MG capsule Take 1 capsule by mouth 3 (Three) Times a Day As Needed (Leg pain). 90 capsule 5    hydrALAZINE (APRESOLINE) 25 MG tablet Take 1 tablet by mouth twice daily (Patient taking differently: Take 1 tablet by mouth Daily.) 180 tablet 3    hydroxychloroquine (PLAQUENIL) 200 MG tablet Take 1 tablet by mouth 2 (Two) Times a Day. 180 tablet 1    losartan (COZAAR) 50 MG tablet Take 1 tablet by mouth once daily 90 tablet 0    meloxicam (MOBIC) 15 MG tablet Take 1 tablet by mouth Daily As Needed for Moderate Pain. 30 tablet 2    metoprolol tartrate (LOPRESSOR) 25 MG tablet TAKE 1 TABLET BY MOUTH EVERY 12 HOURS 180 tablet 0     "pravastatin (PRAVACHOL) 20 MG tablet Take 1 tablet by mouth once daily 30 tablet 0    triamcinolone (KENALOG) 0.1 % ointment apply to the AA twice a day x 2 weeks      [DISCONTINUED] clopidogrel (PLAVIX) 75 MG tablet Take 1 tablet by mouth Daily.      [DISCONTINUED] traMADol (ULTRAM) 50 MG tablet Take 0.5 tablets by mouth Every 6 (Six) Hours As Needed for Moderate Pain.       No current facility-administered medications on file prior to visit.       Current outpatient and discharge medications have been reconciled for the patient.  Reviewed by: Salome Knott MD        The following portions of the patient's history were reviewed and updated as appropriate: allergies, current medications, past family history, past medical history, past social history, past surgical history, and problem list.    Review of Systems   Constitutional:  Negative for fatigue and unexpected weight change.   Eyes:  Negative for visual disturbance.   Respiratory:  Negative for cough, shortness of breath and wheezing.    Cardiovascular:  Negative for chest pain, palpitations and leg swelling.        No AQUINO, orthopnea, or claudication.   Gastrointestinal:  Positive for constipation. Negative for abdominal pain, blood in stool, diarrhea, nausea and vomiting.        Denies melena.   Endocrine: Negative for polydipsia and polyuria.   Genitourinary:  Negative for difficulty urinating, dysuria, frequency, hematuria and urgency.   Musculoskeletal:  Negative for arthralgias and myalgias.   Neurological:  Negative for dizziness, syncope, light-headedness and headaches.        No memory issues.   Psychiatric/Behavioral:  Negative for decreased concentration.          Objective       Blood pressure 130/84, pulse 78, temperature 98.7 °F (37.1 °C), resp. rate 20, height 166.4 cm (65.5\"), weight 83 kg (183 lb), SpO2 98%.  Body mass index is 29.99 kg/m².      Physical Exam  Vitals and nursing note reviewed.   Constitutional:       Appearance: He is " well-developed.      Comments: BMI greater than 25   Neck:      Thyroid: No thyroid mass or thyromegaly.      Vascular: No carotid bruit.   Cardiovascular:      Rate and Rhythm: Normal rate and regular rhythm.      Pulses: Normal pulses.      Heart sounds: Normal heart sounds. No murmur heard.     No friction rub. No gallop.   Pulmonary:      Effort: Pulmonary effort is normal.      Breath sounds: Normal breath sounds.   Abdominal:      General: Bowel sounds are normal. There is no distension or abdominal bruit.      Palpations: Abdomen is soft. There is no hepatomegaly, splenomegaly or mass.      Tenderness: There is no abdominal tenderness.   Musculoskeletal:      Cervical back: Normal range of motion and neck supple.      Right lower leg: No edema.      Left lower leg: No edema.   Neurological:      Mental Status: He is alert.   Psychiatric:         Mood and Affect: Mood normal.         Advance Care Planning   ACP discussion was held with the patient during this visit. Patient does not have an advance directive, information provided.  ACP information pamphlet given to the patient.  ACP information provided on the AVS.    Assessment / Plan:  Diagnoses and all orders for this visit:    1. Dyslipidemia (Primary)  -     Lipid Panel  -     Comprehensive Metabolic Panel  -     TSH  -     CBC & Differential   Continue current medication(s) as noted in the history of present illness.    2. Primary hypertension  -     Lipid Panel  -     Comprehensive Metabolic Panel  -     TSH  -     CBC & Differential   Continue current medication(s) as noted in the history of present illness.    3. Coronary artery disease involving native coronary artery of native heart without angina pectoris  -     Lipid Panel  -     Comprehensive Metabolic Panel  -     TSH  -     CBC & Differential   Continue current medication(s) as noted in the history of present illness.   Follow up per Cardiology.    4. Benign colon polyp   Colonoscopy  up-to-date.    5. Solitary nodule of right lobe of thyroid   Monitor.    6. Benign prostatic hyperplasia, unspecified whether lower urinary tract symptoms present   Follow up per Urology.    7. Rheumatoid arthritis involving multiple sites with positive rheumatoid factor   Continue current medication(s) as noted in the history of present illness.   Follow up per Rheumatology.    8.  Primary osteoarthritis involving multiple joints   Continue current medication(s) as noted in the history of present illness.   Follow up per Rheumatology.    9. Screening for prostate cancer  -     PSA Screen    I encouraged the patient to discuss lack of efficacy of Gabapentin and Sinemet for his RLS with Dr. Jorgensen.      I recommended the RSV vaccine and the Shingrix (Shingles vaccine) at the pharmacy.    I recommended the Influenza vaccine and the COVID-19 bivalent vaccine in office today.  The patient declined.  The patient was informed he could be hospitalized and die from Influenza and/or COVID-19 infection.        BMI is >= 25 and <30. (Overweight) The following options were offered after discussion;: exercise counseling/recommendations and nutrition counseling/recommendations           Return in about 6 months (around 7/13/2025) for Recheck HTN, fasting.

## 2025-01-26 DIAGNOSIS — I10 ESSENTIAL HYPERTENSION: ICD-10-CM

## 2025-01-27 RX ORDER — LOSARTAN POTASSIUM 50 MG/1
50 TABLET ORAL DAILY
Qty: 90 TABLET | Refills: 3 | Status: SHIPPED | OUTPATIENT
Start: 2025-01-27

## 2025-01-29 ENCOUNTER — OFFICE VISIT (OUTPATIENT)
Age: 80
End: 2025-01-29
Payer: MEDICARE

## 2025-01-29 VITALS
WEIGHT: 184.2 LBS | HEIGHT: 65 IN | BODY MASS INDEX: 30.69 KG/M2 | DIASTOLIC BLOOD PRESSURE: 78 MMHG | TEMPERATURE: 97.3 F | SYSTOLIC BLOOD PRESSURE: 116 MMHG | HEART RATE: 64 BPM

## 2025-01-29 DIAGNOSIS — Z79.899 HIGH RISK MEDICATION USE: ICD-10-CM

## 2025-01-29 DIAGNOSIS — M05.79 RHEUMATOID ARTHRITIS INVOLVING MULTIPLE SITES WITH POSITIVE RHEUMATOID FACTOR: Primary | ICD-10-CM

## 2025-01-29 RX ORDER — HYDROXYCHLOROQUINE SULFATE 200 MG/1
200 TABLET, FILM COATED ORAL 2 TIMES DAILY
Qty: 180 TABLET | Refills: 1 | Status: SHIPPED | OUTPATIENT
Start: 2025-01-29

## 2025-01-29 NOTE — PROGRESS NOTES
Office Follow Up      Date: 01/29/2025   Patient Name: Jasiel Ellis  MRN: [id number]  YOB: 1945    Referring Physician: Salome Knott MD     Chief Complaint:   Chief Complaint   Patient presents with    Rheumatoid Arthritis       History of Present Illness: Jasiel Ellis is a 79 y.o. male who is here today for follow up on seropositive rheumatoid arthritis.      History:  He reports that starting in the spring of 2023 he developed bilateral shoulder pain and stiffness this started in the right shoulder and spread to the left shoulder.  In late February 2024 he developed wrist pain this started in the right wrist and spreads at the left wrist.  This only lasted for 24 hours and then resolved on its own.  The joint pain then migrated to involve his left knee with pain and swelling behind the left knee that lasted 24 hours and then seemed to resolve on its own.      Interim 1/29/2025:  Today he reports significant improvement in joint pain on Plaquenil.  No side effects to Plaquenil.  He also has meloxicam from his PCP but rarely takes it.  He is trying to minimize his use of NSAIDs with his history of coronary disease      Subjective       Review of Systems: Review of Systems   Constitutional:  Negative for chills, fatigue, fever and unexpected weight loss.   HENT:  Negative for mouth sores, sinus pressure and sore throat.         Dry mouth  Nose sores   Eyes:  Negative for pain and redness.        Dry eyes   Respiratory:  Negative for cough and shortness of breath.    Cardiovascular:  Negative for chest pain.   Gastrointestinal:  Negative for abdominal pain, blood in stool, diarrhea, nausea, vomiting and GERD.   Endocrine: Negative for polydipsia and polyuria.   Genitourinary:  Negative for dysuria, genital sores and hematuria.   Musculoskeletal:  Positive for myalgias. Negative for arthralgias, back pain, joint swelling, neck pain and neck stiffness.   Skin:  Negative for  rash and bruise.        Psoriasis  Photosensitvity  Malar rash   Allergic/Immunologic: Negative for immunocompromised state.   Neurological:  Negative for seizures, weakness, numbness and memory problem.   Hematological:  Negative for adenopathy. Does not bruise/bleed easily.   Psychiatric/Behavioral:  Negative for depressed mood. The patient is not nervous/anxious.       Past Medical History:   Diagnosis Date    Basal cell carcinoma (BCC) of left side of nose 01/13/2025    Dx 11/2024 (MOHS scheduled for March 15, 2025)      Benign colon polyp     Dx 2004 (rectal polyp ? path).    Bilateral hearing loss     Since approx 2000 (L>R).    Bladder stones     BPH (benign prostatic hyperplasia)     Sees Dr. Francisco    Chronic pain of both knees     R>L.  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    Coronary artery disease involving native coronary artery of native heart     dx 5/2022- Cardiac catheterization for NSTEMI (5/20/2022): Severe 1-vessel CAD (RPL) status post PCI with TABITHA. Echo (5/20/2020): LVEF 60%.  Normal functioning valves    Depression     I did not have depression    Deviated nasal septum 11/03/2015    Provider: Sumanth Blank;Status: Active      Enlarged prostate     Essential hypertension     Dx approx 2000.    Eustachian tube disorder 10/20/2015    From Automated Load;Provider: Hector Bledsoe;Status: Active      History of cardiac catheterization 05/20/2022    LVEF 50%, 90% RCA right posterolateral artery stenosis treated 2.5 x 12 Xience TABITHA, 40-50% mid RCA with normal IFR, 30% proximal LAD, Chronically occluded first diagonal with 3-4+ collaterals    History of echocardiogram 02/06/2018    EF 60-65%, trace MR/TR, mildly increased LV wall thickness.    History of echocardiogram 05/20/2022    Normal.  EF 60 %    History of varicella     Hyperlipidemia     Dx 8/04 ().    Hypertension     Infectious viral hepatitis     I dit not have hepatitis     NSTEMI (non-ST elevated myocardial infarction) 2022    Cardiac catheterization for NSTEMI (): Severe 1-vessel CAD (RPL) status post PCI with TABITHA. Echo (2020): LVEF 60%.  Normal functioning valves    Osteoarthritis     Restless legs syndrome (RLS)     Saw Jameel PERRY).    Rheumatoid arthritis involving multiple sites with positive rheumatoid factor 2024    Rheumatoid factor positive     Sensorineural hearing loss 10/20/2015    From Automated Load;Provider: Hector Bledsoe;Status: Active      Solitary nodule of right lobe of thyroid     Dx 2018 (approx) per Dr. Blank.  Patient states ultrasound showed a Calcium deposit.       Social History     Socioeconomic History    Marital status:    Tobacco Use    Smoking status: Former     Current packs/day: 0.00     Average packs/day: 1 pack/day for 50.3 years (50.3 ttl pk-yrs)     Types: Cigarettes     Start date: 1965     Quit date: 2015     Years since quittin.7     Passive exposure: Past    Smokeless tobacco: Never   Vaping Use    Vaping status: Never Used   Substance and Sexual Activity    Alcohol use: Yes     Alcohol/week: 3.0 standard drinks of alcohol     Types: 3 Cans of beer per week     Comment: 2 beers per week    Drug use: Never    Sexual activity: Not Currently     Partners: Female         Medications:   Current Outpatient Medications:     acetaminophen (TYLENOL) 500 MG tablet, Take 1 tablet by mouth Every 6 (Six) Hours As Needed for Mild Pain., Disp: , Rfl:     aspirin 81 MG EC tablet, Take 1 tablet by mouth Daily., Disp: 90 tablet, Rfl: 1    carbidopa-levodopa (SINEMET)  MG per tablet, Take 1 tablet by mouth Daily., Disp: , Rfl:     clobetasol (TEMOVATE) 0.05 % ointment, APPLY A THIN LAYER TO THE AFFECTED AREA(S) BY TOPICAL ROUTE 2 TIMES PER DAY FOR UP TO 2 WEEKS. TAKE A 2 WEEK BREAK BEFORE REPEATING AS NEEDED, Disp: , Rfl:     cloNIDine (CATAPRES) 0.1 MG tablet, Take 1 tablet by mouth 2 (Two) Times a  "Day As Needed for High Blood Pressure (Systolic blood pressure consistently above 180)., Disp: 6 tablet, Rfl: 0    coenzyme Q10 100 MG capsule, Take 1 capsule by mouth Daily., Disp: , Rfl:     fluorouracil (Efudex) 5 % cream, apply to the AA twice a day x 3 weeks, Disp: , Rfl:     gabapentin (NEURONTIN) 300 MG capsule, Take 1 capsule by mouth 3 (Three) Times a Day As Needed (Leg pain)., Disp: 90 capsule, Rfl: 5    hydroxychloroquine (PLAQUENIL) 200 MG tablet, Take 1 tablet by mouth 2 (Two) Times a Day., Disp: 180 tablet, Rfl: 1    losartan (COZAAR) 50 MG tablet, Take 1 tablet by mouth once daily, Disp: 90 tablet, Rfl: 3    metoprolol tartrate (LOPRESSOR) 25 MG tablet, TAKE 1 TABLET BY MOUTH EVERY 12 HOURS, Disp: 180 tablet, Rfl: 0    pravastatin (PRAVACHOL) 20 MG tablet, Take 1 tablet by mouth once daily, Disp: 30 tablet, Rfl: 0    triamcinolone (KENALOG) 0.1 % ointment, apply to the AA twice a day x 2 weeks, Disp: , Rfl:     meloxicam (MOBIC) 15 MG tablet, Take 1 tablet by mouth Daily As Needed for Moderate Pain. (Patient not taking: Reported on 1/29/2025), Disp: 30 tablet, Rfl: 2    Allergies:   Allergies   Allergen Reactions    Atorvastatin Myalgia    Crestor [Rosuvastatin] Myalgia       I have reviewed and updated the patient's chief complaint, history of present illness, review of systems, past medical history, surgical history, family history, social history, medications and allergy list, physical exam, assessment and plan as appropriate.     Objective        Vital Signs:   Vitals:    01/29/25 0933   BP: 116/78   BP Location: Right arm   Patient Position: Sitting   Cuff Size: Adult   Pulse: 64   Temp: 97.3 °F (36.3 °C)   Weight: 83.6 kg (184 lb 3.2 oz)   Height: 165.1 cm (65\")   PainSc:   2       Body mass index is 30.65 kg/m².      Physical Exam:  Physical Exam   MUSCULOSKELETAL:   No peripheral synovitis  No dactylitis.  No pitting of the nails.  No tenderness hands or wrists.  No rheumatoid nodules or " "tophi.  No tenderness hips.  Knees with slight crepitus.  No warmth or effusion to the knees.    Complete joint exam was performed including the MCPs, PIPs, DIPs of the hands, wrists, elbows, shoulders, hips, knees and ankles.  No soft tissue swelling or tenderness is present except as above.    General: The patient is well-developed and well nourished. Cooperative, alert and oriented. Affect is normal. Hydration appears normal.   HEENT: Normocephalic and atraumatic. No notable alopecia. Lids and conjunctiva are normal. Pupils are equal and sclera are clear. Oropharynx is clear   NECK neck is supple without adenopathy, masses or thyromegaly.   CARDIOVASCULAR: Regular rate and rhythm. No murmurs, rubs or gallops   LUNGS: Effort is normal. Lungs are clear bilateral   ABDOMEN: Not examined  EXTREMITIES: Peripheral pulses are intact. No clubbing.   SKIN: No rashes. No subcutaneous nodules. No digital ulcers. No sclerodactyly.   NEUROLOGIC: Gait is normal. Strength testing is normal.  No focal neurologic deficits    Results Review:   Labs:   Lab Results   Component Value Date    GLUCOSE 92 01/13/2025    BUN 17 01/13/2025    CREATININE 0.89 01/13/2025    EGFR 88.3 01/13/2025    BCR 19.1 01/13/2025    K 4.2 01/13/2025    CO2 25.0 01/13/2025    CALCIUM 9.3 01/13/2025    ALBUMIN 4.3 01/13/2025    BILITOT 0.4 01/13/2025    AST 22 01/13/2025    ALT 23 01/13/2025     Lab Results   Component Value Date    WBC 8.30 01/13/2025    HGB 14.9 01/13/2025    HCT 43.7 01/13/2025    MCV 82.0 01/13/2025     01/13/2025     No results found for: \"SEDRATE\"  Lab Results   Component Value Date    CRP 1.75 (H) 02/28/2024     No results found for: \"QUANTIFERO\", \"QUANTITB1\", \"QUANTITB2\", \"QUANTIFERN\", \"QUANTIFERM\", \"QUANTITBGLDP\"  No results found for: \"RF\"  Lab Results   Component Value Date    HEPCVIRUSABY Non-Reactive 06/29/2018         Procedures    Assessment / Plan        -Rheumatoid arthritis involving multiple sites with positive " rheumatoid factor  Retired .  Grandmother with rheumatoid arthritis  -Serology: + rheumatoid factor>100, +++ ACPA 239 (3/14/24)  -Clinical: Bilateral shoulder pain onset spring of 2023, late February 2024 sudden pain bilateral wrists that lasted 24 hours and then migrated to left knee for 24 hours  **Current:  Plaquenil 3/24, Meloxicam(outside).     Low disease activity from rheumatoid arthritis.  Swollen joint count 0.  Tender joint count 0.  Good prognosis    Significant improvement since addition of Plaquenil 3/24.  No side effects to the Plaquenil.  Strongly encouraged Plaquenil eye exam.  Handout provided on rheumatoid arthritis and Plaquenil.    - labs 1/13/2025 reviewed and stable.    Continue labs every 6 months for monitoring CBC CMP sed rate CRP  -Continue Plaquenil 200 mg twice daily.  Refilled  -Continue yearly Plaquenil eye exam to screen for toxicity  Return to clinic 6 months     -High risk medication use  Hydroxychloroquine  Hepatitis panel - 3/14/2024     Well-tolerated and effective  I discussed the side effects of hydroxychloroquine including but not limited to GI upset, rash, photosensitivity, Hematologic and liver abnormalities and ocular abnormalities and need for frequent eye exam for toxicity monitoring.    -Generalized osteoarthrosis  Minimizes NSAID use with underlying coronary disease    -Coronary artery disease   Status post stenting.  Cardiology Dr. Ugarte  Assessment & Plan  Rheumatoid arthritis involving multiple sites with positive rheumatoid factor    High risk medication use       New Medications Ordered This Visit   Medications    hydroxychloroquine (PLAQUENIL) 200 MG tablet     Sig: Take 1 tablet by mouth 2 (Two) Times a Day.     Dispense:  180 tablet     Refill:  1             Follow Up:   Return in about 6 months (around 7/29/2025).        Arias Cabrera MD  [facility]

## 2025-01-29 NOTE — PATIENT INSTRUCTIONS
Hydroxychloroquine Tablets    What is this medication?  HYDROXYCHLOROQUINE (cam drox ee KLOR oh kwin) treats autoimmune conditions, such as rheumatoid arthritis and lupus. It works by slowing down an overactive immune system. It may also be used to prevent and treat malaria. It works by killing the parasite that causes malaria. It belongs to a group of medications called DMARDs.  This medicine may be used for other purposes; ask your health care provider or pharmacist if you have questions.  COMMON BRAND NAME(S): Plaquenil, Quineprox    What should I tell my care team before I take this medication?  They need to know if you have any of these conditions:  Diabetes  Eye disease, vision problems  Frequently drink alcohol  G6PD deficiency  Heart disease  Irregular heartbeat or rhythm  Kidney disease  Liver disease  Porphyria  Psoriasis  An unusual or allergic reaction to hydroxychloroquine, other medications, foods, dyes, or preservatives  Pregnant or trying to get pregnant  Breastfeeding    How should I use this medication?  Take this medication by mouth with water. Take it as directed on the prescription label. Do not cut, crush, or chew this medication. Swallow the tablets whole. Take it with food. Do not take it more than directed. Take all of this medication unless your care team tells you to stop it early. Keep taking it even if you think you are better.  Take products with antacids in them at a different time of day than this medication. Take this medication 4 hours before or 4 hours after antacids. Talk to your care team if you have questions.  Talk to your care team about the use of this medication in children. While this medication may be prescribed for selected conditions, precautions do apply.  Overdosage: If you think you have taken too much of this medicine contact a poison control center or emergency room at once.  NOTE: This medicine is only for you. Do not share this medicine with others.    What if I  miss a dose?  If you miss a dose, take it as soon as you can. If it is almost time for your next dose, take only that dose. Do not take double or extra doses.    What may interact with this medication?  Do not take this medication with any of the following:  Cisapride  Dronedarone  Pimozide  Thioridazine  This medication may also interact with the following:  Ampicillin  Antacids  Cimetidine  Cyclosporine  Digoxin  Kaolin  Medications for diabetes, such as insulin, glipizide, glyburide  Medications for seizures, such as carbamazepine, phenobarbital, phenytoin  Mefloquine  Methotrexate  Other medications that cause heart rhythm changes  Praziquantel  This list may not describe all possible interactions. Give your health care provider a list of all the medicines, herbs, non-prescription drugs, or dietary supplements you use. Also tell them if you smoke, drink alcohol, or use illegal drugs. Some items may interact with your medicine.    What should I watch for while using this medication?  Visit your care team for regular checks on your progress. Tell your care team if your symptoms do not start to get better or if they get worse.  You may need blood work done while you are taking this medication. If you take other medications that can affect heart rhythm, you may need more testing. Talk to your care team if you have questions.  Your vision may be tested before and during use of this medication. Tell your care team right away if you have any change in your eyesight.  This medication may cause serious skin reactions. They can happen weeks to months after starting the medication. Contact your care team right away if you notice fevers or flu-like symptoms with a rash. The rash may be red or purple and then turn into blisters or peeling of the skin. Or, you might notice a red rash with swelling of the face, lips or lymph nodes in your neck or under your arms.  If you or your family notice any changes in your behavior, such  as new or worsening depression, thoughts of harming yourself, anxiety, or other unusual or disturbing thoughts, or memory loss, call your care team right away.    What side effects may I notice from receiving this medication?  Side effects that you should report to your care team as soon as possible:  Allergic reactions--skin rash, itching, hives, swelling of the face, lips, tongue, or throat  Aplastic anemia--unusual weakness or fatigue, dizziness, headache, trouble breathing, increased bleeding or bruising  Change in vision  Heart rhythm changes--fast or irregular heartbeat, dizziness, feeling faint or lightheaded, chest pain, trouble breathing  Infection--fever, chills, cough, or sore throat  Low blood sugar (hypoglycemia)--tremors or shaking, anxiety, sweating, cold or clammy skin, confusion, dizziness, rapid heartbeat  Muscle injury--unusual weakness or fatigue, muscle pain, dark yellow or brown urine, decrease in amount of urine  Pain, tingling, or numbness in the hands or feet  Rash, fever, and swollen lymph nodes  Redness, blistering, peeling, or loosening of the skin, including inside the mouth  Thoughts of suicide or self-harm, worsening mood, or feelings of depression  Unusual bruising or bleeding  Side effects that usually do not require medical attention (report to your care team if they continue or are bothersome):  Diarrhea  Headache  Nausea  Stomach pain  Vomiting  This list may not describe all possible side effects. Call your doctor for medical advice about side effects. You may report side effects to FDA at 5-934-FDA-3494.    Where should I keep my medication?  Keep out of the reach of children and pets.  Store at room temperature up to 30 degrees C (86 degrees F). Protect from light. Get rid of any unused medication after the expiration date.  To get rid of medications that are no longer needed or have :  Take the medication to a medication take-back program. Check with your pharmacy or  law enforcement to find a location.  If you cannot return the medication, check the label or package insert to see if the medication should be thrown out in the garbage or flushed down the toilet. If you are not sure, ask your care team. If it is safe to put it in the trash, empty the medication out of the container. Mix the medication with cat litter, dirt, coffee grounds, or other unwanted substance. Seal the mixture in a bag or container. Put it in the trash.  NOTE: This sheet is a summary. It may not cover all possible information. If you have questions about this medicine, talk to your doctor, pharmacist, or health care provider.  © 2024 [employer] (2023-06-26 00:00:00)

## 2025-02-08 DIAGNOSIS — I25.110 CORONARY ARTERY DISEASE INVOLVING NATIVE CORONARY ARTERY OF NATIVE HEART WITH UNSTABLE ANGINA PECTORIS: ICD-10-CM

## 2025-02-10 RX ORDER — METOPROLOL TARTRATE 25 MG/1
25 TABLET, FILM COATED ORAL EVERY 12 HOURS SCHEDULED
Qty: 180 TABLET | Refills: 3 | Status: SHIPPED | OUTPATIENT
Start: 2025-02-10

## 2025-02-21 DIAGNOSIS — E78.5 HYPERLIPIDEMIA LDL GOAL <70: ICD-10-CM

## 2025-02-21 RX ORDER — PRAVASTATIN SODIUM 20 MG
20 TABLET ORAL DAILY
Qty: 30 TABLET | Refills: 0 | Status: SHIPPED | OUTPATIENT
Start: 2025-02-21 | End: 2025-02-24 | Stop reason: SDUPTHER

## 2025-02-24 ENCOUNTER — TELEPHONE (OUTPATIENT)
Dept: INTERNAL MEDICINE | Facility: CLINIC | Age: 80
End: 2025-02-24
Payer: MEDICARE

## 2025-02-24 NOTE — TELEPHONE ENCOUNTER
Caller: Jasiel Ellis    Relationship: Self    Best call back number:     Requested Prescriptions:          pravastatin (PRAVACHOL) 20 MG tablet       Pharmacy where request should be sent: [facility] [id number] - [place] [id number] - [phone]  - [phone] FX     Last office visit with prescribing clinician: 1/13/2025   Last telemedicine visit with prescribing clinician: Visit date not found   Next office visit with prescribing clinician: 7/15/2025     Additional details provided by patient: THIS WAS SENT TO [facility] IN ERROR AND PATIENT IS OUT OF MEDICATION BY TOMORROW    Does the patient have less than a 3 day supply:  [x] Yes  [] No    Would you like a call back once the refill request has been completed: [] Yes [] No    If the office needs to give you a call back, can they leave a voicemail: [] Yes [] No    Ann Grimm Rep   02/24/25 10:24 EST

## 2025-04-02 ENCOUNTER — TELEPHONE (OUTPATIENT)
Dept: CARDIOLOGY | Facility: CLINIC | Age: 80
End: 2025-04-02
Payer: MEDICARE

## 2025-04-02 NOTE — TELEPHONE ENCOUNTER
Patient came into clinic today with complaints of elevated blood pressures in the 190s.      States everything was okay until yesterday, blood pressure started increasing. Highest BP yesterday was 195/64. Patient said left ear was ringing. Denies dizziness, chest pain, shortness of breath, headache, numbness/tingling. States besides the ear ringing, he feels great.     This morning BP was 103/58.   Before coming to the clinic today, BP was 164/60s.     Patient states he is taking medication as normal.

## 2025-04-03 NOTE — TELEPHONE ENCOUNTER
Spoke with patient, he advises he feels fine, but every 6 months or so, almost like clock work, his BP will become elevated for around 24 hours.  His pulse will be in the 50's.  No symptoms, but he is concerned that something may happen when it gets that high.   He would like to know if we have any suggestions, or maybe medication changes that would help with this issue

## 2025-07-15 ENCOUNTER — OFFICE VISIT (OUTPATIENT)
Dept: INTERNAL MEDICINE | Facility: CLINIC | Age: 80
End: 2025-07-15
Payer: MEDICARE

## 2025-07-15 VITALS
TEMPERATURE: 98.7 F | WEIGHT: 178.4 LBS | HEART RATE: 58 BPM | RESPIRATION RATE: 10 BRPM | SYSTOLIC BLOOD PRESSURE: 122 MMHG | BODY MASS INDEX: 29.69 KG/M2 | DIASTOLIC BLOOD PRESSURE: 88 MMHG

## 2025-07-15 DIAGNOSIS — E78.5 HYPERLIPIDEMIA LDL GOAL <70: ICD-10-CM

## 2025-07-15 DIAGNOSIS — I10 PRIMARY HYPERTENSION: Primary | ICD-10-CM

## 2025-07-15 DIAGNOSIS — E04.1 SOLITARY NODULE OF RIGHT LOBE OF THYROID: ICD-10-CM

## 2025-07-15 DIAGNOSIS — I25.10 CORONARY ARTERY DISEASE INVOLVING NATIVE CORONARY ARTERY OF NATIVE HEART WITHOUT ANGINA PECTORIS: ICD-10-CM

## 2025-07-15 DIAGNOSIS — M15.0 PRIMARY OSTEOARTHRITIS INVOLVING MULTIPLE JOINTS: ICD-10-CM

## 2025-07-15 DIAGNOSIS — M05.79 RHEUMATOID ARTHRITIS INVOLVING MULTIPLE SITES WITH POSITIVE RHEUMATOID FACTOR: ICD-10-CM

## 2025-07-15 DIAGNOSIS — N40.0 BENIGN PROSTATIC HYPERPLASIA, UNSPECIFIED WHETHER LOWER URINARY TRACT SYMPTOMS PRESENT: ICD-10-CM

## 2025-07-15 DIAGNOSIS — G25.81 RESTLESS LEGS SYNDROME (RLS): ICD-10-CM

## 2025-07-15 DIAGNOSIS — K63.5 BENIGN COLON POLYP: ICD-10-CM

## 2025-07-15 PROBLEM — L30.8 SPONGIOTIC DERMATITIS: Status: ACTIVE | Noted: 2025-07-15

## 2025-07-15 LAB
ALBUMIN SERPL-MCNC: 4.3 G/DL (ref 3.5–5.2)
ALBUMIN/GLOB SERPL: 1.3 G/DL
ALP SERPL-CCNC: 59 U/L (ref 39–117)
ALT SERPL W P-5'-P-CCNC: 13 U/L (ref 1–41)
ANION GAP SERPL CALCULATED.3IONS-SCNC: 11 MMOL/L (ref 5–15)
AST SERPL-CCNC: 18 U/L (ref 1–40)
BASOPHILS # BLD AUTO: 0.05 10*3/MM3 (ref 0–0.2)
BASOPHILS NFR BLD AUTO: 0.7 % (ref 0–1.5)
BILIRUB SERPL-MCNC: 0.4 MG/DL (ref 0–1.2)
BUN SERPL-MCNC: 18 MG/DL (ref 8–23)
BUN/CREAT SERPL: 17.5 (ref 7–25)
CALCIUM SPEC-SCNC: 9.7 MG/DL (ref 8.6–10.5)
CHLORIDE SERPL-SCNC: 98 MMOL/L (ref 98–107)
CHOLEST SERPL-MCNC: 158 MG/DL (ref 0–200)
CO2 SERPL-SCNC: 26 MMOL/L (ref 22–29)
CREAT SERPL-MCNC: 1.03 MG/DL (ref 0.76–1.27)
DEPRECATED RDW RBC AUTO: 37.3 FL (ref 37–54)
EGFRCR SERPLBLD CKD-EPI 2021: 74.8 ML/MIN/1.73
EOSINOPHIL # BLD AUTO: 0.2 10*3/MM3 (ref 0–0.4)
EOSINOPHIL NFR BLD AUTO: 2.7 % (ref 0.3–6.2)
ERYTHROCYTE [DISTWIDTH] IN BLOOD BY AUTOMATED COUNT: 12.5 % (ref 12.3–15.4)
GLOBULIN UR ELPH-MCNC: 3.2 GM/DL
GLUCOSE SERPL-MCNC: 99 MG/DL (ref 65–99)
HCT VFR BLD AUTO: 44.2 % (ref 37.5–51)
HDLC SERPL-MCNC: 43 MG/DL (ref 40–60)
HGB BLD-MCNC: 14.7 G/DL (ref 13–17.7)
IMM GRANULOCYTES # BLD AUTO: 0.03 10*3/MM3 (ref 0–0.05)
IMM GRANULOCYTES NFR BLD AUTO: 0.4 % (ref 0–0.5)
LDLC SERPL CALC-MCNC: 91 MG/DL (ref 0–100)
LDLC/HDLC SERPL: 2.03 {RATIO}
LYMPHOCYTES # BLD AUTO: 1.81 10*3/MM3 (ref 0.7–3.1)
LYMPHOCYTES NFR BLD AUTO: 24.1 % (ref 19.6–45.3)
MCH RBC QN AUTO: 28.1 PG (ref 26.6–33)
MCHC RBC AUTO-ENTMCNC: 33.3 G/DL (ref 31.5–35.7)
MCV RBC AUTO: 84.5 FL (ref 79–97)
MONOCYTES # BLD AUTO: 0.65 10*3/MM3 (ref 0.1–0.9)
MONOCYTES NFR BLD AUTO: 8.7 % (ref 5–12)
NEUTROPHILS NFR BLD AUTO: 4.76 10*3/MM3 (ref 1.7–7)
NEUTROPHILS NFR BLD AUTO: 63.4 % (ref 42.7–76)
NRBC BLD AUTO-RTO: 0 /100 WBC (ref 0–0.2)
PLATELET # BLD AUTO: 210 10*3/MM3 (ref 140–450)
PMV BLD AUTO: 10.5 FL (ref 6–12)
POTASSIUM SERPL-SCNC: 4.5 MMOL/L (ref 3.5–5.2)
PROT SERPL-MCNC: 7.5 G/DL (ref 6–8.5)
RBC # BLD AUTO: 5.23 10*6/MM3 (ref 4.14–5.8)
SODIUM SERPL-SCNC: 135 MMOL/L (ref 136–145)
TRIGL SERPL-MCNC: 138 MG/DL (ref 0–150)
VLDLC SERPL-MCNC: 24 MG/DL (ref 5–40)
WBC NRBC COR # BLD AUTO: 7.5 10*3/MM3 (ref 3.4–10.8)

## 2025-07-15 PROCEDURE — 1125F AMNT PAIN NOTED PAIN PRSNT: CPT | Performed by: INTERNAL MEDICINE

## 2025-07-15 PROCEDURE — 85025 COMPLETE CBC W/AUTO DIFF WBC: CPT | Performed by: INTERNAL MEDICINE

## 2025-07-15 PROCEDURE — 80061 LIPID PANEL: CPT | Performed by: INTERNAL MEDICINE

## 2025-07-15 PROCEDURE — 1111F DSCHRG MED/CURRENT MED MERGE: CPT | Performed by: INTERNAL MEDICINE

## 2025-07-15 PROCEDURE — 1159F MED LIST DOCD IN RCRD: CPT | Performed by: INTERNAL MEDICINE

## 2025-07-15 PROCEDURE — 80053 COMPREHEN METABOLIC PANEL: CPT | Performed by: INTERNAL MEDICINE

## 2025-07-15 PROCEDURE — 3079F DIAST BP 80-89 MM HG: CPT | Performed by: INTERNAL MEDICINE

## 2025-07-15 PROCEDURE — 1160F RVW MEDS BY RX/DR IN RCRD: CPT | Performed by: INTERNAL MEDICINE

## 2025-07-15 PROCEDURE — 3074F SYST BP LT 130 MM HG: CPT | Performed by: INTERNAL MEDICINE

## 2025-07-15 PROCEDURE — 99214 OFFICE O/P EST MOD 30 MIN: CPT | Performed by: INTERNAL MEDICINE

## 2025-07-15 PROCEDURE — 36415 COLL VENOUS BLD VENIPUNCTURE: CPT | Performed by: INTERNAL MEDICINE

## 2025-07-15 RX ORDER — CALCIPOTRIENE AND BETAMETHASONE DIPROPIONATE 50; 64 UG/G; MG/G
CREAM TOPICAL
COMMUNITY
Start: 2025-07-01

## 2025-07-15 RX ORDER — TACROLIMUS 1 MG/G
OINTMENT TOPICAL
COMMUNITY
End: 2025-07-15

## 2025-07-15 RX ORDER — HYDRALAZINE HYDROCHLORIDE 25 MG/1
25 TABLET, FILM COATED ORAL 2 TIMES DAILY
COMMUNITY

## 2025-07-15 RX ORDER — BETAMETHASONE DIPROPIONATE 0.05 %
OINTMENT (GRAM) TOPICAL
COMMUNITY
Start: 2025-03-04 | End: 2025-07-15

## 2025-07-15 NOTE — PATIENT INSTRUCTIONS
I recommend Shingrix (Shingles vaccine) and Hepatitis A vaccine at the pharmacy, as we discussed.    I recommend the RSV vaccine at the pharmacy September 2025, as we discussed.    I recommend the Influenza vaccine and the COVID-19 bivalent vaccine, in our office or at the pharmacy, Fall 2025, as we discussed.

## 2025-07-15 NOTE — PROGRESS NOTES
John Ellis is a 79 y.o. male.     Chief Complaint   Patient presents with    Hypertension     6-month follow-up    Hyperlipidemia    Coronary Artery Disease       History obtained from the patient.      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  He has tried multiple topical antifungal and steroid creams without relief.  He states he gets the most relief with Vaseline Petroleum Jelly    The patient has Osteoarthritis, involving multiple joints.  The patient was diagnosed with Rheumatoid Arthritis in February 2024.  He is now seeing Dr. Cabrera, last visit 1/29/2025.  He reports his arthralgias have resolved on Plaquenil.  He takes Meloxicam as needed.      The patient has seen Dr. Blank for a right-sided Thyroid Nodule.  Ultrasound 2019 showed the nodule to be calcified, so biopsy was not recommended.  Thyroid ultrasound was stable (mildly suspicious) 1/22/21.  Thyroid ultrasound on 2/9/22 showed: Benign colloid cyst within the lower pole the right lobe of the thyroid gland.  No suspicious solid thyroid nodule identified..  Last thyroid ultrasound on 2/2/2024 showed:  Stable size and appearance of a subcentimeter TI-RADS 2 nodule in the inferior right thyroid lobe which is unchanged since 2020.  TI-RADS: TR2 - Not Suspicious. No follow up needed.  On 1/13/2025, TSH 1 normal.   He reports dry skin and occasional constipation.  He denies other thyroid symptoms.      The patient sees Dr. Jorgensen as needed for Restless Leg Syndrome, stable on Gabapentin and Sinemet, which he feels is ineffective. Last visit was 5/13/2025.      The patient sees Dr. Francisco, Urology, once per year, for BPH, last visit 3/28/2025.  PSA on 1/13/2025 was normal (0.865).  He had the [facility] in 2016.  He is not on medication.  He denies urinary symptoms.       Cardiac Follow-up: The patient is here for a follow-up visit.        His Hypertension has been stable.    Medications: Metoprolol, Losartan, and Hydralazine.  He is on Clonidine as needed.  His Hyperlipidemia has been stable.    LDL goal is < 70.  Last LDL 72, .  Medication: Pravastatin and Co-Q10.  He has been prescribed Repatha, but did not start it.    His Coronary Artery Disease (diagnosed in May 2022. NSTEMI 5/20/22) has been stable.  Medication: Metoprolol, Pravastatin, Aspirin, and Losartan.  Plavix was discontinued on 11/6/2023.  Side Effects: None.  Myalgias (hips and shoulders), on Atorvastatin.       Procedures: On 5/20/2022, 2D Echocardiogram was normal, EF 60%.  On 5/20/2022 cardiac catheterization showed LVEF 50%, 90% RCA right posterolateral artery stenosis treated 2.5 x 12 Xience TABITHA, 40-50% mid RCA with normal IFR, 30% proximal LAD, Chronically occluded first diagonal with 3-4+ collaterals.  On 12/12/2024, Carotid Ultrasound showed: Right internal carotid artery demonstrates a less than 50% stenosis.  Left internal carotid artery demonstrates a less than 50% stenosis. Antegrade right and left vertebral flow     Interval Events: He sees Dr. Ugarte, last visit with JOSE ANTONIO Weller on 1/11/2024.  His Metoprolol was decreased to 12.5 mg  BID and Zetia was added, but he did not take it.  He is to follow-up in 1 year.  He states his blood pressure at home has been 110-115 / 55-60.  When his blood pressure is low, he does not have any symptoms with it.     Symptoms:   Denies chest pain, shortness of breath, AQUINO, orthopnea, PND, palpitations, syncope, lower extremity edema, claudication, lightheadedness, and dizziness.  Associated Symptoms: Weight down 5 pounds in the past 6 months. Denies fatigue, headache, myalgias, arthralgias, polyuria, polydipsia, visual impairment, memory loss, and concentration problems.     Lifestyle: The patient follows a diverse and healthy diet overall (likes sweets).  He walks twice daily.  He is active overall.   Tobacco Use:   Former Smoker.     Colon Polyps Follow-up: The patient is here for follow-up of Colon Polyps which has been stable.  Family History: Brother diagnosed with Stage IV Colon Cancer in his 40s.    Interval Events: Last Colonoscopy was done 1/17/2023, normal..  Symptoms: Reports occasional constipation.  Denies abdominal pain, diarrhea,  hematochezia, melena, or changes in the stool.    Medication: None.      Current Outpatient Medications on File Prior to Visit   Medication Sig Dispense Refill    acetaminophen (TYLENOL) 500 MG tablet Take 1 tablet by mouth Every 6 (Six) Hours As Needed for Mild Pain.      aspirin 81 MG EC tablet Take 1 tablet by mouth Daily. 90 tablet 1    Calcipotriene-Betameth Diprop (Wynzora) 0.005-0.064 % cream Apply by topical route for 30 days.      carbidopa-levodopa (SINEMET)  MG per tablet Take 1 tablet by mouth Daily.      cloNIDine (CATAPRES) 0.1 MG tablet Take 1 tablet by mouth 2 (Two) Times a Day As Needed for High Blood Pressure (Systolic blood pressure consistently above 180). 6 tablet 0    coenzyme Q10 100 MG capsule Take 1 capsule by mouth Daily.      gabapentin (NEURONTIN) 300 MG capsule Take 1 capsule by mouth 3 (Three) Times a Day As Needed (Leg pain). 90 capsule 5    hydrALAZINE (APRESOLINE) 25 MG tablet Take 1 tablet by mouth 2 (Two) Times a Day.      hydroxychloroquine (PLAQUENIL) 200 MG tablet Take 1 tablet by mouth 2 (Two) Times a Day. 180 tablet 1    losartan (COZAAR) 50 MG tablet Take 1 tablet by mouth once daily 90 tablet 3    meloxicam (MOBIC) 15 MG tablet Take 1 tablet by mouth Daily As Needed for Moderate Pain. 30 tablet 2    metoprolol tartrate (LOPRESSOR) 25 MG tablet TAKE 1 TABLET BY MOUTH EVERY 12 HOURS 180 tablet 3    pravastatin (PRAVACHOL) 20 MG tablet Take 1 tablet by mouth Daily. 90 tablet 3    [DISCONTINUED] betamethasone dipropionate 0.05 % ointment apply to hands BID x2 weeks, QD x2 weeks, then 2-3 times weekly for maintenance      [DISCONTINUED]  clobetasol (TEMOVATE) 0.05 % ointment APPLY A THIN LAYER TO THE AFFECTED AREA(S) BY TOPICAL ROUTE 2 TIMES PER DAY FOR UP TO 2 WEEKS. TAKE A 2 WEEK BREAK BEFORE REPEATING AS NEEDED      [DISCONTINUED] fluorouracil (Efudex) 5 % cream apply to the AA twice a day x 3 weeks      [DISCONTINUED] tacrolimus (PROTOPIC) 0.1 % ointment Apply to the hands BID x 1 week. Alternate with the clobetasol rx.      [DISCONTINUED] triamcinolone (KENALOG) 0.1 % ointment apply to the AA twice a day x 2 weeks       No current facility-administered medications on file prior to visit.       Current outpatient and discharge medications have been reconciled for the patient.  Reviewed by: Salome Knott MD        The following portions of the patient's history were reviewed and updated as appropriate: allergies, current medications, past family history, past medical history, past social history, past surgical history, and problem list.    Review of Systems   Constitutional:  Negative for fatigue and unexpected weight change.   Eyes:  Negative for visual disturbance.   Respiratory:  Negative for cough, shortness of breath and wheezing.    Cardiovascular:  Negative for chest pain, palpitations and leg swelling.        No AQUINO, orthopnea, or claudication.   Gastrointestinal:  Negative for abdominal pain, blood in stool, constipation, diarrhea, nausea and vomiting.        Denies melena.   Endocrine: Negative for polydipsia, polyphagia and polyuria.   Genitourinary:  Negative for difficulty urinating, dysuria, frequency, hematuria and urgency.   Musculoskeletal:  Negative for arthralgias and myalgias.   Neurological:  Negative for dizziness, syncope, light-headedness and headaches.        No memory issues.   Psychiatric/Behavioral:  Negative for decreased concentration.          Objective       Blood pressure 122/88, pulse 58, temperature 98.7 °F (37.1 °C), temperature source Infrared, resp. rate 10, weight 80.9 kg (178 lb 6.4 oz).  Body mass index  is 29.69 kg/m².      Physical Exam  Vitals and nursing note reviewed.   Constitutional:       Appearance: He is well-developed.      Comments: BMI greater than 25   Neck:      Thyroid: No thyroid mass or thyromegaly.      Vascular: No carotid bruit.   Cardiovascular:      Rate and Rhythm: Normal rate and regular rhythm.      Pulses: Normal pulses.      Heart sounds: Normal heart sounds. No murmur heard.  Pulmonary:      Effort: Pulmonary effort is normal.      Breath sounds: Normal breath sounds.   Musculoskeletal:      Right lower leg: No edema.      Left lower leg: No edema.   Skin:     Findings: Rash (Dry white patches bilateral palms) present.   Neurological:      Mental Status: He is alert.   Psychiatric:         Mood and Affect: Mood normal.         Assessment / Plan:  Diagnoses and all orders for this visit:    1. Primary hypertension (Primary)  -     Lipid Panel  -     Comprehensive Metabolic Panel  -     CBC & Differential   Continue current medication(s) as noted in the history of present illness.    2. Hyperlipidemia LDL goal <70  -     Lipid Panel  -     Comprehensive Metabolic Panel  -     CBC & Differential   Continue current medication(s) as noted in the history of present illness.    3. Coronary artery disease involving native coronary artery of native heart without angina pectoris  -     Lipid Panel  -     Comprehensive Metabolic Panel  -     CBC & Differential   Continue current medication(s) as noted in the history of present illness.   Follow-up per Cardiology.    4. Benign colon polyp   Colonoscopy up-to-date.     5. Benign prostatic hyperplasia, unspecified whether lower urinary tract symptoms present   Stable, no medication.   Follow-up per Urology.    6. Rheumatoid arthritis involving multiple sites with positive rheumatoid factor   Continue current medication(s) as noted in the history of present illness.   Follow-up with Rheumatology.    7. Primary osteoarthritis involving multiple  joints   Continue current medication(s) as noted in the history of present illness.   Follow-up with Rheumatology.    8. Restless legs syndrome (RLS)   Continue current medication(s) as noted in the history of present illness.   Follow-up per Neurology.    9. Solitary nodule of right lobe of thyroid   Monitor      I recommended Shingrix (Shingles vaccine) and Hepatitis A vaccine at the pharmacy.    I recommended the RSV vaccine at the pharmacy September 2025.    I recommended the Influenza vaccine and the COVID-19 bivalent vaccine, in our office or at the pharmacy, Fall 2025.           Return in about 6 months (around 1/15/2026) for Recheck Hyperlipidemia, fasting.  The patient declines Medicare Wellness exams.

## 2025-07-22 ENCOUNTER — RESULTS FOLLOW-UP (OUTPATIENT)
Dept: INTERNAL MEDICINE | Facility: CLINIC | Age: 80
End: 2025-07-22
Payer: MEDICARE

## 2025-07-22 NOTE — LETTER
Jasiel Ellis  [address] [facility] [id number]    July 22, 2025     Dear Mr. Ellis:    Below are the results from your recent visit:    Resulted Orders   Lipid Panel   Result Value Ref Range    Total Cholesterol 158 0 - 200 mg/dL    Triglycerides 138 0 - 150 mg/dL    HDL Cholesterol 43 40 - 60 mg/dL    LDL Cholesterol  91 0 - 100 mg/dL    VLDL Cholesterol 24 5 - 40 mg/dL    LDL/HDL Ratio 2.03    Comprehensive Metabolic Panel   Result Value Ref Range    Glucose 99 65 - 99 mg/dL    BUN 18.0 8.0 - 23.0 mg/dL    Creatinine 1.03 0.76 - 1.27 mg/dL    Sodium 135 (L) 136 - 145 mmol/L    Potassium 4.5 3.5 - 5.2 mmol/L    Chloride 98 98 - 107 mmol/L    CO2 26.0 22.0 - 29.0 mmol/L    Calcium 9.7 8.6 - 10.5 mg/dL    Total Protein 7.5 6.0 - 8.5 g/dL    Albumin 4.3 3.5 - 5.2 g/dL    ALT (SGPT) 13 1 - 41 U/L    AST (SGOT) 18 1 - 40 U/L    Alkaline Phosphatase 59 39 - 117 U/L    Total Bilirubin 0.4 0.0 - 1.2 mg/dL    Globulin 3.2 gm/dL    A/G Ratio 1.3 g/dL    BUN/Creatinine Ratio 17.5 7.0 - 25.0    Anion Gap 11.0 5.0 - 15.0 mmol/L    eGFR 74.8 >60.0 mL/min/1.73   CBC Auto Differential   Result Value Ref Range    WBC 7.50 3.40 - 10.80 10*3/mm3    RBC 5.23 4.14 - 5.80 10*6/mm3    Hemoglobin 14.7 13.0 - 17.7 g/dL    Hematocrit 44.2 37.5 - 51.0 %    MCV 84.5 79.0 - 97.0 fL    MCH 28.1 26.6 - 33.0 pg    MCHC 33.3 31.5 - 35.7 g/dL    RDW 12.5 12.3 - 15.4 %    RDW-SD 37.3 37.0 - 54.0 fl    MPV 10.5 6.0 - 12.0 fL    Platelets 210 140 - 450 10*3/mm3    Neutrophil % 63.4 42.7 - 76.0 %    Lymphocyte % 24.1 19.6 - 45.3 %    Monocyte % 8.7 5.0 - 12.0 %    Eosinophil % 2.7 0.3 - 6.2 %    Basophil % 0.7 0.0 - 1.5 %    Immature Grans % 0.4 0.0 - 0.5 %    Neutrophils, Absolute 4.76 1.70 - 7.00 10*3/mm3    Lymphocytes, Absolute 1.81 0.70 - 3.10 10*3/mm3    Monocytes, Absolute 0.65 0.10 - 0.90 10*3/mm3    Eosinophils, Absolute 0.20 0.00 - 0.40 10*3/mm3    Basophils, Absolute 0.05 0.00 - 0.20 10*3/mm3    Immature Grans, Absolute 0.03  0.00 - 0.05 10*3/mm3    nRBC 0.0 0.0 - 0.2 /100 WBC       Cholesterol levels are not quite at goal (LDL goal is less than 70).  Please try to be stricter with your heart healthy diet and exercise program, to bring these levels down.  We may need to increase your Pravastatin dose next visit.    Otherwise, labs look good.  Please continue your current medication and plan.     If you have any questions or concerns, please don't hesitate to call.         Sincerely,        Salome Knott MD

## 2025-07-28 ENCOUNTER — OFFICE VISIT (OUTPATIENT)
Age: 80
End: 2025-07-28
Payer: MEDICARE

## 2025-07-28 VITALS
TEMPERATURE: 96.9 F | WEIGHT: 175.6 LBS | BODY MASS INDEX: 29.26 KG/M2 | DIASTOLIC BLOOD PRESSURE: 78 MMHG | SYSTOLIC BLOOD PRESSURE: 132 MMHG | HEIGHT: 65 IN | HEART RATE: 61 BPM

## 2025-07-28 DIAGNOSIS — Z79.899 HIGH RISK MEDICATION USE: ICD-10-CM

## 2025-07-28 DIAGNOSIS — M05.79 RHEUMATOID ARTHRITIS INVOLVING MULTIPLE SITES WITH POSITIVE RHEUMATOID FACTOR: ICD-10-CM

## 2025-07-28 DIAGNOSIS — L25.9 CONTACT DERMATITIS, UNSPECIFIED CONTACT DERMATITIS TYPE, UNSPECIFIED TRIGGER: Primary | ICD-10-CM

## 2025-07-28 RX ORDER — HYDROXYCHLOROQUINE SULFATE 200 MG/1
200 TABLET, FILM COATED ORAL 2 TIMES DAILY
Qty: 180 TABLET | Refills: 1 | Status: SHIPPED | OUTPATIENT
Start: 2025-07-28

## 2025-07-28 NOTE — PATIENT INSTRUCTIONS
Hydroxychloroquine Tablets    What is this medication?  HYDROXYCHLOROQUINE (cam drox ee KLOR oh kwin) treats autoimmune conditions, such as rheumatoid arthritis and lupus. It works by slowing down an overactive immune system. It may also be used to prevent and treat malaria. It works by killing the parasite that causes malaria. It belongs to a group of medications called DMARDs.  This medicine may be used for other purposes; ask your health care provider or pharmacist if you have questions.  COMMON BRAND NAME(S): Plaquenil, Quineprox    What should I tell my care team before I take this medication?  They need to know if you have any of these conditions:  Diabetes  Eye disease, vision problems  Frequently drink alcohol  G6PD deficiency  Heart disease  Irregular heartbeat or rhythm  Kidney disease  Liver disease  Porphyria  Psoriasis  An unusual or allergic reaction to hydroxychloroquine, other medications, foods, dyes, or preservatives  Pregnant or trying to get pregnant  Breastfeeding    How should I use this medication?  Take this medication by mouth with water. Take it as directed on the prescription label. Do not cut, crush, or chew this medication. Swallow the tablets whole. Take it with food. Do not take it more than directed. Take all of this medication unless your care team tells you to stop it early. Keep taking it even if you think you are better.  Take products with antacids in them at a different time of day than this medication. Take this medication 4 hours before or 4 hours after antacids. Talk to your care team if you have questions.  Talk to your care team about the use of this medication in children. While this medication may be prescribed for selected conditions, precautions do apply.  Overdosage: If you think you have taken too much of this medicine contact a poison control center or emergency room at once.  NOTE: This medicine is only for you. Do not share this medicine with others.    What if I  miss a dose?  If you miss a dose, take it as soon as you can. If it is almost time for your next dose, take only that dose. Do not take double or extra doses.    What may interact with this medication?  Do not take this medication with any of the following:  Cisapride  Dronedarone  Pimozide  Thioridazine  This medication may also interact with the following:  Ampicillin  Antacids  Cimetidine  Cyclosporine  Digoxin  Kaolin  Medications for diabetes, such as insulin, glipizide, glyburide  Medications for seizures, such as carbamazepine, phenobarbital, phenytoin  Mefloquine  Methotrexate  Other medications that cause heart rhythm changes  Praziquantel  This list may not describe all possible interactions. Give your health care provider a list of all the medicines, herbs, non-prescription drugs, or dietary supplements you use. Also tell them if you smoke, drink alcohol, or use illegal drugs. Some items may interact with your medicine.    What should I watch for while using this medication?  Visit your care team for regular checks on your progress. Tell your care team if your symptoms do not start to get better or if they get worse.  You may need blood work done while you are taking this medication. If you take other medications that can affect heart rhythm, you may need more testing. Talk to your care team if you have questions.  Your vision may be tested before and during use of this medication. Tell your care team right away if you have any change in your eyesight.  This medication may cause serious skin reactions. They can happen weeks to months after starting the medication. Contact your care team right away if you notice fevers or flu-like symptoms with a rash. The rash may be red or purple and then turn into blisters or peeling of the skin. Or, you might notice a red rash with swelling of the face, lips or lymph nodes in your neck or under your arms.  If you or your family notice any changes in your behavior, such  as new or worsening depression, thoughts of harming yourself, anxiety, or other unusual or disturbing thoughts, or memory loss, call your care team right away.    What side effects may I notice from receiving this medication?  Side effects that you should report to your care team as soon as possible:  Allergic reactions--skin rash, itching, hives, swelling of the face, lips, tongue, or throat  Aplastic anemia--unusual weakness or fatigue, dizziness, headache, trouble breathing, increased bleeding or bruising  Change in vision  Heart rhythm changes--fast or irregular heartbeat, dizziness, feeling faint or lightheaded, chest pain, trouble breathing  Infection--fever, chills, cough, or sore throat  Low blood sugar (hypoglycemia)--tremors or shaking, anxiety, sweating, cold or clammy skin, confusion, dizziness, rapid heartbeat  Muscle injury--unusual weakness or fatigue, muscle pain, dark yellow or brown urine, decrease in amount of urine  Pain, tingling, or numbness in the hands or feet  Rash, fever, and swollen lymph nodes  Redness, blistering, peeling, or loosening of the skin, including inside the mouth  Thoughts of suicide or self-harm, worsening mood, or feelings of depression  Unusual bruising or bleeding  Side effects that usually do not require medical attention (report to your care team if they continue or are bothersome):  Diarrhea  Headache  Nausea  Stomach pain  Vomiting  This list may not describe all possible side effects. Call your doctor for medical advice about side effects. You may report side effects to FDA at 4-344-FDA-3717.    Where should I keep my medication?  Keep out of the reach of children and pets.  Store at room temperature up to 30 degrees C (86 degrees F). Protect from light. Get rid of any unused medication after the expiration date.  To get rid of medications that are no longer needed or have :  Take the medication to a medication take-back program. Check with your pharmacy or  law enforcement to find a location.  If you cannot return the medication, check the label or package insert to see if the medication should be thrown out in the garbage or flushed down the toilet. If you are not sure, ask your care team. If it is safe to put it in the trash, empty the medication out of the container. Mix the medication with cat litter, dirt, coffee grounds, or other unwanted substance. Seal the mixture in a bag or container. Put it in the trash.  NOTE: This sheet is a summary. It may not cover all possible information. If you have questions about this medicine, talk to your doctor, pharmacist, or health care provider.  © 2024 [employer] (2023-06-26 00:00:00)

## 2025-07-28 NOTE — PROGRESS NOTES
Office Follow Up      Date: 07/28/2025   Patient Name: Jasiel Ellis  MRN: [id number]  YOB: 1945    Referring Physician: No ref. provider found     Chief Complaint:   Chief Complaint   Patient presents with   • Rheumatoid Arthritis       History of Present Illness: Jasiel Ellis is a 79 y.o. male who is here today for follow up on seropositive rheumatoid arthritis.       History:  He reports that starting in the spring of 2023 he developed bilateral shoulder pain and stiffness this started in the right shoulder and spread to the left shoulder.  In late February 2024 he developed wrist pain this started in the right wrist and spreads at the left wrist.  This only lasted for 24 hours and then resolved on its own.  The joint pain then migrated to involve his left knee with pain and swelling behind the left knee that lasted 24 hours and then seemed to resolve on its own.       Interim 7/20/2025:  Today he reports significant improvement in joint pain on Plaquenil.  No side effects to Plaquenil.  Eye exam with  ophthalmology stable.  He also has meloxicam from his PCP but rarely takes it.  He is trying to minimize his use of NSAIDs with his history of coronary disease.  Notes dry skin on his fingers particularly in the mornings which frustrates him.    History of Present Illness        Subjective       Review of Systems: Review of Systems   Constitutional:  Negative for chills, fatigue, fever and unexpected weight loss.   HENT:  Negative for mouth sores, sinus pressure and sore throat.    Eyes:  Negative for pain and redness.   Respiratory:  Negative for cough and shortness of breath.    Cardiovascular:  Negative for chest pain.   Gastrointestinal:  Negative for abdominal pain, blood in stool, diarrhea, nausea, vomiting and GERD.   Endocrine: Negative for polydipsia and polyuria.   Genitourinary:  Negative for dysuria, genital sores and hematuria.   Musculoskeletal:  Negative for  arthralgias, back pain, joint swelling, myalgias, neck pain and neck stiffness.   Skin:  Positive for dry skin. Negative for rash and bruise.   Neurological:  Negative for seizures, weakness, numbness and memory problem.   Hematological:  Negative for adenopathy. Does not bruise/bleed easily.   Psychiatric/Behavioral:  Negative for depressed mood. The patient is not nervous/anxious.         Past Medical History:   Past Medical History:   Diagnosis Date   • Basal cell carcinoma (BCC) of left side of nose 01/13/2025    Dx 11/2024 ([facility]HS scheduled for March 15, 2025)     • Benign colon polyp     Dx 2004 (rectal polyp ? path).   • Bilateral hearing loss     Since approx 2000 (L>R).   • Bladder stones    • BPH (benign prostatic hyperplasia)     Sees Dr. Francisco   • Chronic pain of both knees     R>L.  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   • Coronary artery disease involving native coronary artery of native heart     dx 5/2022- Cardiac catheterization for NSTEMI (5/20/2022): Severe 1-vessel CAD (RPL) status post PCI with TABITHA. Echo (5/20/2020): LVEF 60%.  Normal functioning valves   • Depression     I did not have depression   • Deviated nasal septum 11/03/2015    Provider: Sumanth Blank;Status: Active     • Enlarged prostate    • Essential hypertension     Dx approx 2000.   • Eustachian tube disorder 10/20/2015    From Automated Load;Provider: Hector Bledsoe;Status: Active     • History of cardiac catheterization 05/20/2022    LVEF 50%, 90% RCA right posterolateral artery stenosis treated 2.5 x 12 Xience TABITHA, 40-50% mid RCA with normal IFR, 30% proximal LAD, Chronically occluded first diagonal with 3-4+ collaterals   • History of echocardiogram 02/06/2018    EF 60-65%, trace MR/TR, mildly increased LV wall thickness.   • History of echocardiogram 05/20/2022    Normal.  EF 60 %   • History of varicella    • Hyperlipidemia     Dx 8/04 ().   • Hypertension     • Infectious viral hepatitis     I dit not have hepatitis   • NSTEMI (non-ST elevated myocardial infarction) 05/20/2022    Cardiac catheterization for NSTEMI (5/20/202): Severe 1-vessel CAD (RPL) status post PCI with TABITHA. Echo (5/20/2020): LVEF 60%.  Normal functioning valves   • Osteoarthritis    • Restless legs syndrome (RLS)     Saw Jameel (DANETTE).   • Rheumatoid arthritis involving multiple sites with positive rheumatoid factor 07/30/2024   • Rheumatoid factor positive    • Sensorineural hearing loss 10/20/2015    From Automated Load;Provider: Hector Bledsoe;Status: Active     • Solitary nodule of right lobe of thyroid     Dx 2018 (approx) per Dr. Blank.  Patient states ultrasound showed a Calcium deposit.   • Spongiotic dermatitis 07/15/2025    Onset 10/2024, biopsy approximately 12/2024         Past Surgical History:   Past Surgical History:   Procedure Laterality Date   • BLADDER STONE REMOVAL OPEN  2016    [facility]   • CARDIAC CATHETERIZATION N/A 05/20/2022    Procedure: Left Heart Cath;  Surgeon: Stef Ugarte MD;  Location: [facility] CATH INVASIVE LOCATION;  Service: Cardiovascular;  Laterality: N/A;   • CORONARY STENT PLACEMENT  May 2022   • INGUINAL HERNIA REPAIR Right 1998    mesh placed (approx date)   • LASER OF PROSTATE W/ GREEN LIGHT PVP  2016    [facility]   • MOHS SURGERY  01/2025    Left nasal bridge       Family History:   Family History   Problem Relation Age of Onset   • No Known Problems Mother    • No Known Problems Father    • Diabetes Brother    • Diabetes Brother    • Colon cancer Brother    • Rheum arthritis Maternal Grandmother        Social History:   Social History     Socioeconomic History   • Marital status:    Tobacco Use   • Smoking status: Former     Current packs/day: 0.00     Average packs/day: 1 pack/day for 50.3 years (50.3 ttl pk-yrs)     Types: Cigarettes     Start date: 1/1/1965     Quit date: 5/1/2015     Years since quitting: 10.2     Passive exposure: Past   •  Smokeless tobacco: Never   Vaping Use   • Vaping status: Never Used   Substance and Sexual Activity   • Alcohol use: Yes     Alcohol/week: 3.0 standard drinks of alcohol     Types: 3 Cans of beer per week     Comment: 2 beers per week   • Drug use: Never   • Sexual activity: Not Currently     Partners: Female       Medications:   Current Outpatient Medications:   •  acetaminophen (TYLENOL) 500 MG tablet, Take 1 tablet by mouth Every 6 (Six) Hours As Needed for Mild Pain., Disp: , Rfl:   •  aspirin 81 MG EC tablet, Take 1 tablet by mouth Daily., Disp: 90 tablet, Rfl: 1  •  Calcipotriene-Betameth Diprop (Wynzora) 0.005-0.064 % cream, Apply by topical route for 30 days., Disp: , Rfl:   •  carbidopa-levodopa (SINEMET)  MG per tablet, Take 1 tablet by mouth Daily., Disp: , Rfl:   •  cloNIDine (CATAPRES) 0.1 MG tablet, Take 1 tablet by mouth 2 (Two) Times a Day As Needed for High Blood Pressure (Systolic blood pressure consistently above 180)., Disp: 6 tablet, Rfl: 0  •  coenzyme Q10 100 MG capsule, Take 1 capsule by mouth Daily., Disp: , Rfl:   •  gabapentin (NEURONTIN) 300 MG capsule, Take 1 capsule by mouth 3 (Three) Times a Day As Needed (Leg pain)., Disp: 90 capsule, Rfl: 5  •  hydrALAZINE (APRESOLINE) 25 MG tablet, Take 1 tablet by mouth 2 (Two) Times a Day., Disp: , Rfl:   •  hydroxychloroquine (PLAQUENIL) 200 MG tablet, Take 1 tablet by mouth 2 (Two) Times a Day., Disp: 180 tablet, Rfl: 1  •  losartan (COZAAR) 50 MG tablet, Take 1 tablet by mouth once daily, Disp: 90 tablet, Rfl: 3  •  meloxicam (MOBIC) 15 MG tablet, Take 1 tablet by mouth Daily As Needed for Moderate Pain., Disp: 30 tablet, Rfl: 2  •  metoprolol tartrate (LOPRESSOR) 25 MG tablet, TAKE 1 TABLET BY MOUTH EVERY 12 HOURS, Disp: 180 tablet, Rfl: 3  •  pravastatin (PRAVACHOL) 20 MG tablet, Take 1 tablet by mouth Daily., Disp: 90 tablet, Rfl: 3    Allergies:   Allergies   Allergen Reactions   • Atorvastatin Myalgia   • Crestor [Rosuvastatin]  "Myalgia           Objective        Vital Signs:   Vitals:    07/28/25 0929   BP: 132/78   BP Location: Left arm   Patient Position: Sitting   Cuff Size: Adult   Pulse: 61   Temp: 96.9 °F (36.1 °C)   Weight: 79.7 kg (175 lb 9.6 oz)   Height: 165.1 cm (65\")   PainSc: 1      Body mass index is 29.22 kg/m².      Physical Exam:  Physical Exam   MUSCULOSKELETAL:   No peripheral synovitis  No dactylitis.  No pitting of the nails.  No tenderness hands or wrists.  No rheumatoid nodules or tophi.  No tenderness hips.  Knees with slight crepitus.  No warmth or effusion to the knees.    Complete joint exam was performed including the MCPs, PIPs, DIPs of the hands, wrists, elbows, shoulders, hips, knees and ankles.  No soft tissue swelling or tenderness is present except as above.    General: The patient is well-developed and well nourished. Cooperative, alert and oriented. Affect is normal. Hydration appears normal.   HEENT: Normocephalic and atraumatic. Lids and conjunctiva are normal. Pupils are equal and sclera are clear. Oropharynx is clear   NECK neck is supple without adenopathy, masses or thyromegaly.   CARDIOVASCULAR: Regular rate and rhythm. No murmurs, rubs or gallops   LUNGS: Effort is normal. Lungs are clear bilateral   ABDOMEN: Not examined  EXTREMITIES: Peripheral pulses are intact. No clubbing.   SKIN: No rashes. No subcutaneous nodules. No digital ulcers. No sclerodactyly.   NEUROLOGIC: Gait is normal. Strength testing is normal.  No focal neurologic deficits    Results Review:   Labs:   Lab Results   Component Value Date    GLUCOSE 99 07/15/2025    BUN 18.0 07/15/2025    CREATININE 1.03 07/15/2025    EGFR 74.8 07/15/2025    BCR 17.5 07/15/2025    K 4.5 07/15/2025    CO2 26.0 07/15/2025    CALCIUM 9.7 07/15/2025    ALBUMIN 4.3 07/15/2025    BILITOT 0.4 07/15/2025    AST 18 07/15/2025    ALT 13 07/15/2025     Lab Results   Component Value Date    WBC 7.50 07/15/2025    HGB 14.7 07/15/2025    HCT 44.2 07/15/2025 " "   MCV 84.5 07/15/2025     07/15/2025     No results found for: \"SEDRATE\"  Lab Results   Component Value Date    CRP 1.75 (H) 02/28/2024     No results found for: \"QUANTIFERO\", \"QUANTITB1\", \"QUANTITB2\", \"QUANTIFERN\", \"QUANTIFERM\", \"QUANTITBGLDP\"  No results found for: \"RF\"  Lab Results   Component Value Date    HEPCVIRUSABY Non-Reactive 06/29/2018         Procedures    Assessment / Plan      -Rheumatoid arthritis involving multiple sites with positive rheumatoid factor  Retired .  Grandmother with rheumatoid arthritis  -Serology: + rheumatoid factor>100, +++ ACPA 239 (3/14/24)  -Clinical: Bilateral shoulder pain onset spring of 2023, late February 2024 sudden pain bilateral wrists that lasted 24 hours and then migrated to left knee for 24 hours  **Current:  Plaquenil 3/24, Meloxicam(outside).     Low disease activity from rheumatoid arthritis.  Swollen joint count 0.  Tender joint count 0.  Good prognosis     Significant improvement since addition of Plaquenil 3/24.  No side effects to the Plaquenil.    Recent eye exam at  stable  Handout provided on rheumatoid arthritis and Plaquenil.    - labs 7/15/25 reviewed and stable.    Continue labs every 6 months for monitoring CBC CMP sed rate CRP  -Continue Plaquenil 200 mg twice daily.  Refilled  -Continue yearly Plaquenil eye exam to screen for toxicity with Dr Quijano at .  Notes reviewed  Return to clinic 6 months     -High risk medication use  Hydroxychloroquine  Hepatitis panel - 3/14/2024     Well-tolerated and effective  I discussed the side effects of hydroxychloroquine including but not limited to GI upset, rash, photosensitivity, Hematologic and liver abnormalities and ocular abnormalities and need for frequent eye exam for toxicity monitoring.     -Generalized osteoarthrosis  Minimizes NSAID use with underlying coronary disease     -Coronary artery disease   Status post stenting.  Cardiology Dr. Ugarte    -Contact dermatitis fingers  This is " his major complaint today   Photos he shows me of his fingers on his phone today appear consistent with contact dermatitis   This is not very active in clinic today.  Continue moisturizers.  Consult [facility] dermatology(Dr. Mcdonald/Dr. Ortega) for further evaluation at patient request        1. Contact dermatitis, unspecified contact dermatitis type, unspecified trigger    2. Rheumatoid arthritis involving multiple sites with positive rheumatoid factor    3. High risk medication use        Assessment & Plan        Orders Placed This Encounter   Procedures   • Ambulatory Referral to Dermatology     New Medications Ordered This Visit   Medications   • hydroxychloroquine (PLAQUENIL) 200 MG tablet     Sig: Take 1 tablet by mouth 2 (Two) Times a Day.     Dispense:  180 tablet     Refill:  1       Follow Up:   Return in about 6 months (around 1/28/2026).      Discussed plan of care in detail with the patient today.  Patient verbalized understanding and agrees.    I confirm accuracy of unchanged data/findings which have been carried forward from previous visit.  I have updated appropriately those that have changed.        Arias Cabrera MD  [facility] [place]

## (undated) DEVICE — NDL ANGIOGR ADV THN SMOTH SGLWALL 21G 1.5

## (undated) DEVICE — CATH DIAG EXPO M/ PK 6FR FL4/FR4 PIG 3PK

## (undated) DEVICE — GUIDE CATHETER: Brand: MACH1™

## (undated) DEVICE — MODEL AT P65, P/N 701554-001KIT CONTENTS: HAND CONTROLLER, 3-WAY HIGH-PRESSURE STOPCOCK WITH ROTATING END AND PREMIUM HIGH-PRESSURE TUBING: Brand: ANGIOTOUCH® KIT

## (undated) DEVICE — HI-TORQUE VERSATURN F GUIDE WIRE FULLY COATED .014 STRAIGHT TIP 190 CM: Brand: HI-TORQUE VERSATURN

## (undated) DEVICE — PK CATH CARD 10

## (undated) DEVICE — DEV INFL MONARCH 25W

## (undated) DEVICE — Device: Brand: OMNIWIRE PRESSURE GUIDE WIRE

## (undated) DEVICE — CATH DIAG EXPO .056 FL3.5 6F 100CM

## (undated) DEVICE — GW PERIPH GUIDERIGHT STD/EXCHNG/J/TIP SS 0.035IN 5X260CM

## (undated) DEVICE — DEV COMP RAD PRELUDESYNC 24CM

## (undated) DEVICE — TREK CORONARY DILATATION CATHETER 2.50 MM X 12 MM / RAPID-EXCHANGE: Brand: TREK

## (undated) DEVICE — GLIDESHEATH BASIC HYDROPHILIC COATED INTRODUCER SHEATH: Brand: GLIDESHEATH

## (undated) DEVICE — MODEL BT2000 P/N 700287-012KIT CONTENTS: MANIFOLD WITH SALINE AND CONTRAST PORTS, SALINE TUBING WITH SPIKE AND HAND SYRINGE, TRANSDUCER: Brand: BT2000 AUTOMATED MANIFOLD KIT